# Patient Record
Sex: FEMALE | Race: BLACK OR AFRICAN AMERICAN | NOT HISPANIC OR LATINO | Employment: OTHER | URBAN - METROPOLITAN AREA
[De-identification: names, ages, dates, MRNs, and addresses within clinical notes are randomized per-mention and may not be internally consistent; named-entity substitution may affect disease eponyms.]

---

## 2017-01-01 ENCOUNTER — APPOINTMENT (OUTPATIENT)
Dept: CT IMAGING | Facility: HOSPITAL | Age: 72
End: 2017-01-01

## 2017-01-01 ENCOUNTER — HOSPITAL ENCOUNTER (OUTPATIENT)
Facility: HOSPITAL | Age: 72
Setting detail: OBSERVATION
LOS: 1 days | Discharge: HOME | End: 2017-02-27
Attending: EMERGENCY MEDICINE | Admitting: INTERNAL MEDICINE

## 2017-01-01 ENCOUNTER — APPOINTMENT (OUTPATIENT)
Dept: GENERAL RADIOLOGY | Facility: HOSPITAL | Age: 72
End: 2017-01-01

## 2017-01-01 ENCOUNTER — APPOINTMENT (OUTPATIENT)
Dept: CARDIOLOGY | Facility: HOSPITAL | Age: 72
End: 2017-01-01
Attending: INTERNAL MEDICINE

## 2017-01-01 ENCOUNTER — HOSPITAL ENCOUNTER (OUTPATIENT)
Dept: GENERAL RADIOLOGY | Facility: HOSPITAL | Age: 72
Discharge: HOME | End: 2017-02-01
Admitting: FAMILY MEDICINE

## 2017-01-01 ENCOUNTER — HOSPITAL ENCOUNTER (EMERGENCY)
Facility: HOSPITAL | Age: 72
Discharge: HOME | End: 2017-08-07
Attending: EMERGENCY MEDICINE | Admitting: EMERGENCY MEDICINE

## 2017-01-01 ENCOUNTER — OFFICE VISIT (OUTPATIENT)
Dept: FAMILY MEDICINE CLINIC | Facility: CLINIC | Age: 72
End: 2017-01-01

## 2017-01-01 ENCOUNTER — HOSPITAL ENCOUNTER (INPATIENT)
Facility: HOSPITAL | Age: 72
LOS: 9 days | Discharge: NURSING, REHAB OR LONG-TERM CARE | End: 2017-07-21
Attending: INTERNAL MEDICINE | Admitting: INTERNAL MEDICINE

## 2017-01-01 ENCOUNTER — ANESTHESIA (OUTPATIENT)
Dept: CARDIOLOGY | Facility: HOSPITAL | Age: 72
End: 2017-01-01

## 2017-01-01 ENCOUNTER — APPOINTMENT (OUTPATIENT)
Dept: CARDIOLOGY | Facility: HOSPITAL | Age: 72
End: 2017-01-01
Attending: HOSPITALIST

## 2017-01-01 ENCOUNTER — HOSPITAL ENCOUNTER (INPATIENT)
Facility: HOSPITAL | Age: 72
LOS: 4 days | End: 2017-08-24
Attending: EMERGENCY MEDICINE | Admitting: INTERNAL MEDICINE

## 2017-01-01 ENCOUNTER — TRANSCRIBE ORDERS (OUTPATIENT)
Dept: CARDIOLOGY | Facility: CLINIC | Age: 72
End: 2017-01-01

## 2017-01-01 ENCOUNTER — CLINICAL SUPPORT NO REQUIREMENTS (OUTPATIENT)
Dept: CARDIOLOGY | Facility: CLINIC | Age: 72
End: 2017-01-01

## 2017-01-01 ENCOUNTER — ANESTHESIA EVENT (OUTPATIENT)
Dept: CARDIOLOGY | Facility: HOSPITAL | Age: 72
End: 2017-01-01

## 2017-01-01 ENCOUNTER — TELEPHONE (OUTPATIENT)
Dept: ENDOCRINOLOGY | Age: 72
End: 2017-01-01

## 2017-01-01 ENCOUNTER — OFFICE VISIT (OUTPATIENT)
Dept: ENDOCRINOLOGY | Age: 72
End: 2017-01-01

## 2017-01-01 ENCOUNTER — APPOINTMENT (OUTPATIENT)
Dept: GENERAL RADIOLOGY | Facility: HOSPITAL | Age: 72
End: 2017-01-01
Attending: INTERNAL MEDICINE

## 2017-01-01 ENCOUNTER — HOSPITAL ENCOUNTER (INPATIENT)
Facility: HOSPITAL | Age: 72
LOS: 1 days | End: 2017-08-24
Attending: INTERNAL MEDICINE | Admitting: INTERNAL MEDICINE

## 2017-01-01 ENCOUNTER — OFFICE VISIT (OUTPATIENT)
Dept: CARDIOLOGY | Facility: CLINIC | Age: 72
End: 2017-01-01

## 2017-01-01 ENCOUNTER — HOSPITAL ENCOUNTER (OUTPATIENT)
Facility: HOSPITAL | Age: 72
Setting detail: OBSERVATION
Discharge: HOME HEALTH | End: 2017-05-02
Attending: EMERGENCY MEDICINE | Admitting: HOSPITALIST

## 2017-01-01 ENCOUNTER — TELEPHONE (OUTPATIENT)
Dept: CARDIOLOGY | Facility: CLINIC | Age: 72
End: 2017-01-01

## 2017-01-01 ENCOUNTER — LAB (OUTPATIENT)
Dept: LAB | Facility: HOSPITAL | Age: 72
End: 2017-01-01
Attending: INTERNAL MEDICINE

## 2017-01-01 ENCOUNTER — HOSPITAL ENCOUNTER (OUTPATIENT)
Dept: SLEEP MEDICINE | Facility: HOSPITAL | Age: 72
Discharge: HOME | End: 2017-06-13
Attending: INTERNAL MEDICINE

## 2017-01-01 VITALS
HEIGHT: 63 IN | HEART RATE: 88 BPM | SYSTOLIC BLOOD PRESSURE: 106 MMHG | OXYGEN SATURATION: 96 % | TEMPERATURE: 98 F | DIASTOLIC BLOOD PRESSURE: 79 MMHG | BODY MASS INDEX: 32.03 KG/M2 | RESPIRATION RATE: 18 BRPM | WEIGHT: 180.78 LBS

## 2017-01-01 VITALS
HEIGHT: 66 IN | DIASTOLIC BLOOD PRESSURE: 48 MMHG | RESPIRATION RATE: 12 BRPM | BODY MASS INDEX: 31.99 KG/M2 | OXYGEN SATURATION: 100 % | HEART RATE: 112 BPM | WEIGHT: 199.08 LBS | SYSTOLIC BLOOD PRESSURE: 62 MMHG | TEMPERATURE: 98.3 F

## 2017-01-01 VITALS
BODY MASS INDEX: 29.25 KG/M2 | HEART RATE: 102 BPM | WEIGHT: 182 LBS | HEIGHT: 66 IN | DIASTOLIC BLOOD PRESSURE: 70 MMHG | SYSTOLIC BLOOD PRESSURE: 126 MMHG

## 2017-01-01 VITALS
WEIGHT: 182.2 LBS | SYSTOLIC BLOOD PRESSURE: 128 MMHG | BODY MASS INDEX: 29.28 KG/M2 | HEIGHT: 66 IN | DIASTOLIC BLOOD PRESSURE: 78 MMHG

## 2017-01-01 VITALS
HEIGHT: 63 IN | SYSTOLIC BLOOD PRESSURE: 120 MMHG | TEMPERATURE: 98.2 F | OXYGEN SATURATION: 95 % | BODY MASS INDEX: 32.6 KG/M2 | DIASTOLIC BLOOD PRESSURE: 60 MMHG | HEART RATE: 84 BPM | WEIGHT: 184 LBS

## 2017-01-01 VITALS
SYSTOLIC BLOOD PRESSURE: 118 MMHG | OXYGEN SATURATION: 99 % | WEIGHT: 250 LBS | DIASTOLIC BLOOD PRESSURE: 64 MMHG | TEMPERATURE: 97.8 F | RESPIRATION RATE: 20 BRPM | HEART RATE: 80 BPM | BODY MASS INDEX: 37.89 KG/M2 | HEIGHT: 68 IN

## 2017-01-01 VITALS
HEART RATE: 66 BPM | DIASTOLIC BLOOD PRESSURE: 46 MMHG | HEIGHT: 65 IN | BODY MASS INDEX: 29.94 KG/M2 | SYSTOLIC BLOOD PRESSURE: 90 MMHG | RESPIRATION RATE: 18 BRPM | TEMPERATURE: 97.4 F | OXYGEN SATURATION: 92 % | WEIGHT: 179.68 LBS

## 2017-01-01 VITALS
BODY MASS INDEX: 28.61 KG/M2 | RESPIRATION RATE: 16 BRPM | DIASTOLIC BLOOD PRESSURE: 78 MMHG | HEART RATE: 97 BPM | OXYGEN SATURATION: 93 % | HEIGHT: 66 IN | TEMPERATURE: 98 F | SYSTOLIC BLOOD PRESSURE: 138 MMHG | WEIGHT: 178 LBS

## 2017-01-01 VITALS
HEIGHT: 66 IN | BODY MASS INDEX: 28.36 KG/M2 | TEMPERATURE: 97.5 F | OXYGEN SATURATION: 97 % | HEART RATE: 103 BPM | SYSTOLIC BLOOD PRESSURE: 129 MMHG | RESPIRATION RATE: 18 BRPM | WEIGHT: 176.44 LBS | DIASTOLIC BLOOD PRESSURE: 47 MMHG

## 2017-01-01 VITALS
OXYGEN SATURATION: 98 % | SYSTOLIC BLOOD PRESSURE: 130 MMHG | WEIGHT: 184 LBS | HEART RATE: 110 BPM | HEIGHT: 63 IN | DIASTOLIC BLOOD PRESSURE: 80 MMHG | TEMPERATURE: 98.5 F | BODY MASS INDEX: 32.6 KG/M2

## 2017-01-01 VITALS
BODY MASS INDEX: 32.6 KG/M2 | DIASTOLIC BLOOD PRESSURE: 78 MMHG | SYSTOLIC BLOOD PRESSURE: 138 MMHG | HEIGHT: 63 IN | WEIGHT: 184 LBS

## 2017-01-01 DIAGNOSIS — E55.9 VITAMIN D DEFICIENCY: ICD-10-CM

## 2017-01-01 DIAGNOSIS — G47.33 OBSTRUCTIVE SLEEP APNEA SYNDROME: ICD-10-CM

## 2017-01-01 DIAGNOSIS — I50.32 CHRONIC DIASTOLIC CONGESTIVE HEART FAILURE (HCC): Primary | ICD-10-CM

## 2017-01-01 DIAGNOSIS — Z79.01 LONG TERM (CURRENT) USE OF ANTICOAGULANTS: ICD-10-CM

## 2017-01-01 DIAGNOSIS — Z95.2 HISTORY OF AORTIC VALVE REPLACEMENT: ICD-10-CM

## 2017-01-01 DIAGNOSIS — S80.212A KNEE ABRASION, LEFT, INITIAL ENCOUNTER: ICD-10-CM

## 2017-01-01 DIAGNOSIS — E11.8 TYPE 2 DIABETES MELLITUS WITH COMPLICATION, UNSPECIFIED LONG TERM INSULIN USE STATUS: Primary | ICD-10-CM

## 2017-01-01 DIAGNOSIS — I10 BENIGN ESSENTIAL HYPERTENSION: ICD-10-CM

## 2017-01-01 DIAGNOSIS — I27.20 PULMONARY HYPERTENSION (HCC): ICD-10-CM

## 2017-01-01 DIAGNOSIS — I50.32 CHRONIC DIASTOLIC CONGESTIVE HEART FAILURE (HCC): ICD-10-CM

## 2017-01-01 DIAGNOSIS — I48.20 CHRONIC A-FIB (HCC): Primary | ICD-10-CM

## 2017-01-01 DIAGNOSIS — Z01.810 PRE-OPERATIVE CARDIOVASCULAR EXAMINATION: Primary | ICD-10-CM

## 2017-01-01 DIAGNOSIS — N18.4 CHRONIC KIDNEY DISEASE, STAGE IV (SEVERE) (HCC): ICD-10-CM

## 2017-01-01 DIAGNOSIS — Z13.6 SCREENING FOR ISCHEMIC HEART DISEASE: ICD-10-CM

## 2017-01-01 DIAGNOSIS — I47.20 VENTRICULAR TACHYCARDIA (HCC): Primary | ICD-10-CM

## 2017-01-01 DIAGNOSIS — N18.6 ESRD (END STAGE RENAL DISEASE) (HCC): ICD-10-CM

## 2017-01-01 DIAGNOSIS — I48.20 CHRONIC A-FIB (HCC): ICD-10-CM

## 2017-01-01 DIAGNOSIS — R06.02 SHORTNESS OF BREATH: ICD-10-CM

## 2017-01-01 DIAGNOSIS — E11.42 DIABETIC PERIPHERAL NEUROPATHY (HCC): ICD-10-CM

## 2017-01-01 DIAGNOSIS — Z95.2 HISTORY OF PROSTHETIC HEART VALVE: ICD-10-CM

## 2017-01-01 DIAGNOSIS — Z99.2 END STAGE RENAL FAILURE ON DIALYSIS (HCC): ICD-10-CM

## 2017-01-01 DIAGNOSIS — Z01.810 PRE-OPERATIVE CARDIOVASCULAR EXAMINATION: ICD-10-CM

## 2017-01-01 DIAGNOSIS — E78.2 MULTIPLE-TYPE HYPERLIPIDEMIA: ICD-10-CM

## 2017-01-01 DIAGNOSIS — I50.9 ACUTE ON CHRONIC CONGESTIVE HEART FAILURE, UNSPECIFIED CONGESTIVE HEART FAILURE TYPE: ICD-10-CM

## 2017-01-01 DIAGNOSIS — N18.6 END STAGE RENAL DISEASE (HCC): ICD-10-CM

## 2017-01-01 DIAGNOSIS — R05.9 COUGH: Primary | ICD-10-CM

## 2017-01-01 DIAGNOSIS — R42 LIGHTHEADEDNESS: ICD-10-CM

## 2017-01-01 DIAGNOSIS — S00.01XA SCALP ABRASION, INITIAL ENCOUNTER: Primary | ICD-10-CM

## 2017-01-01 DIAGNOSIS — G93.41 METABOLIC ENCEPHALOPATHY: ICD-10-CM

## 2017-01-01 DIAGNOSIS — J43.2 CENTRILOBULAR EMPHYSEMA (HCC): ICD-10-CM

## 2017-01-01 DIAGNOSIS — I50.32 CHRONIC DIASTOLIC HEART FAILURE (HCC): ICD-10-CM

## 2017-01-01 DIAGNOSIS — N18.6 END STAGE RENAL FAILURE ON DIALYSIS (HCC): ICD-10-CM

## 2017-01-01 DIAGNOSIS — R53.1 GENERALIZED WEAKNESS: Primary | ICD-10-CM

## 2017-01-01 DIAGNOSIS — J40 BRONCHITIS: ICD-10-CM

## 2017-01-01 DIAGNOSIS — R26.2 DIFFICULTY WALKING: Primary | ICD-10-CM

## 2017-01-01 DIAGNOSIS — R42 DIZZINESS: Primary | ICD-10-CM

## 2017-01-01 DIAGNOSIS — R27.0 ATAXIA: ICD-10-CM

## 2017-01-01 DIAGNOSIS — R53.1 WEAKNESS GENERALIZED: ICD-10-CM

## 2017-01-01 DIAGNOSIS — E83.51 HYPOCALCEMIA: ICD-10-CM

## 2017-01-01 DIAGNOSIS — R26.2 DIFFICULTY WALKING: ICD-10-CM

## 2017-01-01 DIAGNOSIS — R53.1 GENERALIZED WEAKNESS: ICD-10-CM

## 2017-01-01 DIAGNOSIS — Z95.810 AUTOMATIC IMPLANTABLE CARDIOVERTER-DEFIBRILLATOR IN SITU: ICD-10-CM

## 2017-01-01 DIAGNOSIS — J44.9 CHRONIC OBSTRUCTIVE BRONCHITIS (HCC): ICD-10-CM

## 2017-01-01 DIAGNOSIS — R05.9 COUGH: ICD-10-CM

## 2017-01-01 DIAGNOSIS — M62.81 MUSCLE WEAKNESS (GENERALIZED): ICD-10-CM

## 2017-01-01 DIAGNOSIS — R26.89 IMBALANCE: ICD-10-CM

## 2017-01-01 DIAGNOSIS — I47.29 NONSUSTAINED VENTRICULAR TACHYCARDIA (HCC): ICD-10-CM

## 2017-01-01 DIAGNOSIS — A41.9 SEPSIS, DUE TO UNSPECIFIED ORGANISM: Primary | ICD-10-CM

## 2017-01-01 DIAGNOSIS — R53.82 CHRONIC FATIGUE: ICD-10-CM

## 2017-01-01 LAB
25(OH)D3+25(OH)D2 SERPL-MCNC: 23.8 NG/ML (ref 30–100)
ACT BLD: 158 SECONDS (ref 82–152)
ACTH PLAS-MCNC: 11.6 PG/ML (ref 7.2–63.3)
ALBUMIN SERPL-MCNC: 3.2 G/DL (ref 3.5–5.2)
ALBUMIN SERPL-MCNC: 3.2 G/DL (ref 3.5–5.2)
ALBUMIN SERPL-MCNC: 3.3 G/DL (ref 3.5–5.2)
ALBUMIN SERPL-MCNC: 3.5 G/DL (ref 3.5–5.2)
ALBUMIN SERPL-MCNC: 3.6 G/DL (ref 3.5–5.2)
ALBUMIN SERPL-MCNC: 3.7 G/DL (ref 3.5–5.2)
ALBUMIN SERPL-MCNC: 3.9 G/DL (ref 3.5–5.2)
ALBUMIN SERPL-MCNC: 4.1 G/DL (ref 3.5–5.2)
ALBUMIN/GLOB SERPL: 0.8 G/DL
ALBUMIN/GLOB SERPL: 0.9 G/DL
ALBUMIN/GLOB SERPL: 1.1 G/DL
ALBUMIN/GLOB SERPL: 1.2 G/DL
ALDOLASE SERPL-CCNC: 5.2 U/L (ref 3.3–10.3)
ALP SERPL-CCNC: 107 U/L (ref 39–117)
ALP SERPL-CCNC: 133 U/L (ref 39–117)
ALP SERPL-CCNC: 135 U/L (ref 39–117)
ALP SERPL-CCNC: 141 U/L (ref 39–117)
ALT SERPL W P-5'-P-CCNC: 14 U/L (ref 1–33)
ALT SERPL W P-5'-P-CCNC: 15 U/L (ref 1–33)
ALT SERPL W P-5'-P-CCNC: 18 U/L (ref 1–33)
ALT SERPL-CCNC: 12 U/L (ref 1–33)
ANION GAP SERPL CALCULATED.3IONS-SCNC: 17.9 MMOL/L
ANION GAP SERPL CALCULATED.3IONS-SCNC: 19.3 MMOL/L
ANION GAP SERPL CALCULATED.3IONS-SCNC: 19.8 MMOL/L
ANION GAP SERPL CALCULATED.3IONS-SCNC: 20.3 MMOL/L
ANION GAP SERPL CALCULATED.3IONS-SCNC: 20.9 MMOL/L
ANION GAP SERPL CALCULATED.3IONS-SCNC: 21.1 MMOL/L
ANION GAP SERPL CALCULATED.3IONS-SCNC: 21.5 MMOL/L
ANION GAP SERPL CALCULATED.3IONS-SCNC: 21.6 MMOL/L
ANION GAP SERPL CALCULATED.3IONS-SCNC: 21.9 MMOL/L
ANION GAP SERPL CALCULATED.3IONS-SCNC: 22.3 MMOL/L
ANION GAP SERPL CALCULATED.3IONS-SCNC: 22.3 MMOL/L
ANION GAP SERPL CALCULATED.3IONS-SCNC: 22.4 MMOL/L
ANION GAP SERPL CALCULATED.3IONS-SCNC: 22.5 MMOL/L
ANION GAP SERPL CALCULATED.3IONS-SCNC: 22.7 MMOL/L
ANION GAP SERPL CALCULATED.3IONS-SCNC: 23.4 MMOL/L
ANION GAP SERPL CALCULATED.3IONS-SCNC: 24.9 MMOL/L
ANION GAP SERPL CALCULATED.3IONS-SCNC: 25.6 MMOL/L
ANION GAP SERPL CALCULATED.3IONS-SCNC: 25.6 MMOL/L
ANION GAP SERPL CALCULATED.3IONS-SCNC: 25.7 MMOL/L
ANION GAP SERPL CALCULATED.3IONS-SCNC: 26.2 MMOL/L
ANISOCYTOSIS BLD QL: ABNORMAL
APTT PPP: 28.9 SECONDS (ref 22.7–35.4)
APTT PPP: 38.3 SECONDS (ref 22.7–35.4)
APTT PPP: 38.5 SECONDS (ref 22.7–35.4)
APTT PPP: 38.6 SECONDS (ref 22.7–35.4)
APTT PPP: 38.8 SECONDS (ref 22.7–35.4)
APTT PPP: 40.5 SECONDS (ref 22.7–35.4)
APTT PPP: 41.3 SECONDS (ref 22.7–35.4)
APTT PPP: 43.9 SECONDS (ref 22.7–35.4)
APTT PPP: 44.9 SECONDS (ref 22.7–35.4)
APTT PPP: 48.4 SECONDS (ref 22.7–35.4)
ARTERIAL PATENCY WRIST A: ABNORMAL
AST SERPL-CCNC: 17 U/L (ref 1–32)
AST SERPL-CCNC: 19 U/L (ref 1–32)
AST SERPL-CCNC: 25 U/L (ref 1–32)
AST SERPL-CCNC: 31 U/L (ref 1–32)
ATMOSPHERIC PRESS: 749.3 MMHG
ATMOSPHERIC PRESS: 752.3 MMHG
ATMOSPHERIC PRESS: 753.3 MMHG
B PERT DNA SPEC QL NAA+PROBE: NOT DETECTED
BACTERIA BLD CULT: ABNORMAL
BACTERIA SPEC AEROBE CULT: ABNORMAL
BACTERIA SPEC RESP CULT: NORMAL
BASE EXCESS BLDA CALC-SCNC: -1.9 MMOL/L (ref 0–2)
BASE EXCESS BLDA CALC-SCNC: -4.2 MMOL/L (ref 0–2)
BASE EXCESS BLDA CALC-SCNC: -5.4 MMOL/L (ref 0–2)
BASOPHILS # BLD AUTO: 0.01 10*3/MM3 (ref 0–0.2)
BASOPHILS # BLD AUTO: 0.02 10*3/MM3 (ref 0–0.2)
BASOPHILS # BLD MANUAL: 0.19 10*3/MM3 (ref 0–0.2)
BASOPHILS NFR BLD AUTO: 0.1 % (ref 0–1.5)
BASOPHILS NFR BLD AUTO: 0.2 % (ref 0–1.5)
BASOPHILS NFR BLD AUTO: 0.3 % (ref 0–1.5)
BASOPHILS NFR BLD AUTO: 1 % (ref 0–1.5)
BDY SITE: ABNORMAL
BH CV ECHO MEAS - AO MEAN PG (FULL): 10 MMHG
BH CV ECHO MEAS - AO MEAN PG: 12 MMHG
BH CV ECHO MEAS - AO V2 MAX: 230 CM/SEC
BH CV ECHO MEAS - AO V2 MEAN: 160 CM/SEC
BH CV ECHO MEAS - AO V2 VTI: 39.3 CM
BH CV ECHO MEAS - AVA(I,A): 1 CM^2
BH CV ECHO MEAS - AVA(I,D): 1 CM^2
BH CV ECHO MEAS - BSA(HAYCOCK): 1.9 M^2
BH CV ECHO MEAS - BSA: 1.8 M^2
BH CV ECHO MEAS - BZI_BMI: 31.9 KILOGRAMS/M^2
BH CV ECHO MEAS - BZI_METRIC_HEIGHT: 160 CM
BH CV ECHO MEAS - BZI_METRIC_WEIGHT: 81.6 KG
BH CV ECHO MEAS - CONTRAST EF 4CH: 50.6 ML/M^2
BH CV ECHO MEAS - EDV(CUBED): 91.1 ML
BH CV ECHO MEAS - EDV(MOD-SP4): 81 ML
BH CV ECHO MEAS - EDV(TEICH): 92.4 ML
BH CV ECHO MEAS - EF(CUBED): 64 %
BH CV ECHO MEAS - EF(MOD-SP4): 50.6 %
BH CV ECHO MEAS - EF(TEICH): 55.7 %
BH CV ECHO MEAS - ESV(CUBED): 32.8 ML
BH CV ECHO MEAS - ESV(MOD-SP4): 40 ML
BH CV ECHO MEAS - ESV(TEICH): 41 ML
BH CV ECHO MEAS - FS: 28.9 %
BH CV ECHO MEAS - IVS/LVPW: 1.1
BH CV ECHO MEAS - IVSD: 1 CM
BH CV ECHO MEAS - LA DIMENSION: 7 CM
BH CV ECHO MEAS - LAT PEAK E' VEL: 6 CM/SEC
BH CV ECHO MEAS - LV DIASTOLIC VOL/BSA (35-75): 43.8 ML/M^2
BH CV ECHO MEAS - LV MASS(C)D: 142.9 GRAMS
BH CV ECHO MEAS - LV MASS(C)DI: 77.3 GRAMS/M^2
BH CV ECHO MEAS - LV MEAN PG: 2 MMHG
BH CV ECHO MEAS - LV SYSTOLIC VOL/BSA (12-30): 21.6 ML/M^2
BH CV ECHO MEAS - LV V1 MAX: 89 CM/SEC
BH CV ECHO MEAS - LV V1 MEAN: 58.4 CM/SEC
BH CV ECHO MEAS - LV V1 VTI: 17.8 CM
BH CV ECHO MEAS - LVIDD: 4.5 CM
BH CV ECHO MEAS - LVIDS: 3.2 CM
BH CV ECHO MEAS - LVLD AP4: 7.8 CM
BH CV ECHO MEAS - LVLS AP4: 7.6 CM
BH CV ECHO MEAS - LVOT AREA (M): 2.3 CM^2
BH CV ECHO MEAS - LVOT AREA: 2.3 CM^2
BH CV ECHO MEAS - LVOT DIAM: 1.7 CM
BH CV ECHO MEAS - LVPWD: 0.9 CM
BH CV ECHO MEAS - MED PEAK E' VEL: 4 CM/SEC
BH CV ECHO MEAS - MV DEC SLOPE: 599 CM/SEC^2
BH CV ECHO MEAS - MV DEC TIME: 0.26 SEC
BH CV ECHO MEAS - MV E MAX VEL: 193 CM/SEC
BH CV ECHO MEAS - MV MEAN PG: 6 MMHG
BH CV ECHO MEAS - MV P1/2T MAX VEL: 194 CM/SEC
BH CV ECHO MEAS - MV P1/2T: 94.9 MSEC
BH CV ECHO MEAS - MV V2 MEAN: 114 CM/SEC
BH CV ECHO MEAS - MV V2 VTI: 40.4 CM
BH CV ECHO MEAS - MVA P1/2T LCG: 1.1 CM^2
BH CV ECHO MEAS - MVA(P1/2T): 2.3 CM^2
BH CV ECHO MEAS - MVA(VTI): 1 CM^2
BH CV ECHO MEAS - PA ACC SLOPE: 36.1 CM/SEC^2
BH CV ECHO MEAS - PA ACC TIME: 0.11 SEC
BH CV ECHO MEAS - PA MAX PG (FULL): 1.2 MMHG
BH CV ECHO MEAS - PA MAX PG: 3.3 MMHG
BH CV ECHO MEAS - PA PR(ACCEL): 28.2 MMHG
BH CV ECHO MEAS - PA V2 MAX: 90.9 CM/SEC
BH CV ECHO MEAS - PVA(V,A): 3.3 CM^2
BH CV ECHO MEAS - PVA(V,D): 3.3 CM^2
BH CV ECHO MEAS - QP/QS: 1.6
BH CV ECHO MEAS - RAP SYSTOLE: 8 MMHG
BH CV ECHO MEAS - RV MAX PG: 2.1 MMHG
BH CV ECHO MEAS - RV MEAN PG: 1 MMHG
BH CV ECHO MEAS - RV V1 MAX: 72.2 CM/SEC
BH CV ECHO MEAS - RV V1 MEAN: 47.7 CM/SEC
BH CV ECHO MEAS - RV V1 VTI: 15.1 CM
BH CV ECHO MEAS - RVOT AREA: 4.2 CM^2
BH CV ECHO MEAS - RVOT DIAM: 2.3 CM
BH CV ECHO MEAS - RVSP: 63 MMHG
BH CV ECHO MEAS - SI(CUBED): 31.6 ML/M^2
BH CV ECHO MEAS - SI(LVOT): 21.8 ML/M^2
BH CV ECHO MEAS - SI(MOD-SP4): 22.2 ML/M^2
BH CV ECHO MEAS - SI(TEICH): 27.8 ML/M^2
BH CV ECHO MEAS - SV(CUBED): 58.4 ML
BH CV ECHO MEAS - SV(LVOT): 40.4 ML
BH CV ECHO MEAS - SV(MOD-SP4): 41 ML
BH CV ECHO MEAS - SV(RVOT): 62.7 ML
BH CV ECHO MEAS - SV(TEICH): 51.5 ML
BH CV ECHO MEAS - TR MAX VEL: 369 CM/SEC
BH CV ECHO MEAS - TR MAX VEL: 383 CM/SEC
BH CV UPPER VENOUS LEFT AXILLARY AUGMENT: NORMAL
BH CV UPPER VENOUS LEFT AXILLARY COMPETENT: NORMAL
BH CV UPPER VENOUS LEFT AXILLARY COMPRESS: NORMAL
BH CV UPPER VENOUS LEFT AXILLARY PHASIC: NORMAL
BH CV UPPER VENOUS LEFT AXILLARY SPONT: NORMAL
BH CV UPPER VENOUS LEFT BASILIC FOREARM COMPRESS: NORMAL
BH CV UPPER VENOUS LEFT BASILIC UPPER COMPRESS: NORMAL
BH CV UPPER VENOUS LEFT BRACHIAL COMPRESS: NORMAL
BH CV UPPER VENOUS LEFT CEPHALIC FOREARM COMPRESS: NORMAL
BH CV UPPER VENOUS LEFT CEPHALIC UPPER COMPRESS: NORMAL
BH CV UPPER VENOUS LEFT INTERNAL JUGULAR AUGMENT: NORMAL
BH CV UPPER VENOUS LEFT INTERNAL JUGULAR COMPETENT: NORMAL
BH CV UPPER VENOUS LEFT INTERNAL JUGULAR COMPRESS: NORMAL
BH CV UPPER VENOUS LEFT INTERNAL JUGULAR PHASIC: NORMAL
BH CV UPPER VENOUS LEFT INTERNAL JUGULAR SPONT: NORMAL
BH CV UPPER VENOUS LEFT RADIAL COMPRESS: NORMAL
BH CV UPPER VENOUS LEFT SUBCLAVIAN AUGMENT: NORMAL
BH CV UPPER VENOUS LEFT SUBCLAVIAN COMPETENT: NORMAL
BH CV UPPER VENOUS LEFT SUBCLAVIAN PHASIC: NORMAL
BH CV UPPER VENOUS LEFT SUBCLAVIAN SPONT: NORMAL
BH CV UPPER VENOUS LEFT ULNAR COMPRESS: NORMAL
BH CV UPPER VENOUS RIGHT AXILLARY AUGMENT: NORMAL
BH CV UPPER VENOUS RIGHT AXILLARY COMPETENT: NORMAL
BH CV UPPER VENOUS RIGHT AXILLARY COMPRESS: NORMAL
BH CV UPPER VENOUS RIGHT AXILLARY PHASIC: NORMAL
BH CV UPPER VENOUS RIGHT AXILLARY SPONT: NORMAL
BH CV UPPER VENOUS RIGHT BASILIC FOREARM COMPRESS: NORMAL
BH CV UPPER VENOUS RIGHT BASILIC UPPER COMPRESS: NORMAL
BH CV UPPER VENOUS RIGHT BRACHIAL COMPRESS: NORMAL
BH CV UPPER VENOUS RIGHT CEPHALIC FOREARM COMPRESS: NORMAL
BH CV UPPER VENOUS RIGHT CEPHALIC UPPER COMPRESS: NORMAL
BH CV UPPER VENOUS RIGHT INTERNAL JUGULAR AUGMENT: NORMAL
BH CV UPPER VENOUS RIGHT INTERNAL JUGULAR COLOR: 1
BH CV UPPER VENOUS RIGHT INTERNAL JUGULAR COMPRESS: NORMAL
BH CV UPPER VENOUS RIGHT INTERNAL JUGULAR PHASIC: NORMAL
BH CV UPPER VENOUS RIGHT INTERNAL JUGULAR SPONT: NORMAL
BH CV UPPER VENOUS RIGHT INTERNAL JUGULAR THROMBUS: NORMAL
BH CV UPPER VENOUS RIGHT RADIAL COMPRESS: NORMAL
BH CV UPPER VENOUS RIGHT SUBCLAVIAN AUGMENT: NORMAL
BH CV UPPER VENOUS RIGHT SUBCLAVIAN COMPETENT: NORMAL
BH CV UPPER VENOUS RIGHT SUBCLAVIAN COMPRESS: NORMAL
BH CV UPPER VENOUS RIGHT SUBCLAVIAN PHASIC: NORMAL
BH CV UPPER VENOUS RIGHT SUBCLAVIAN SPONT: NORMAL
BH CV UPPER VENOUS RIGHT ULNAR COMPRESS: NORMAL
BH CV VAS BP LEFT ARM: NORMAL MMHG
BH CV XLRA - RV BASE: 3.8 CM
BH CV XLRA - TDI S': 12 CM/SEC
BILIRUB SERPL-MCNC: 0.7 MG/DL (ref 0.1–1.2)
BILIRUB SERPL-MCNC: 1 MG/DL (ref 0.1–1.2)
BILIRUB SERPL-MCNC: 1 MG/DL (ref 0.1–1.2)
BILIRUB SERPL-MCNC: 2.7 MG/DL (ref 0.1–1.2)
BUN BLD-MCNC: 17 MG/DL (ref 8–23)
BUN BLD-MCNC: 20 MG/DL (ref 8–23)
BUN BLD-MCNC: 23 MG/DL (ref 8–23)
BUN BLD-MCNC: 25 MG/DL (ref 8–23)
BUN BLD-MCNC: 26 MG/DL (ref 8–23)
BUN BLD-MCNC: 27 MG/DL (ref 8–23)
BUN BLD-MCNC: 29 MG/DL (ref 8–23)
BUN BLD-MCNC: 30 MG/DL (ref 8–23)
BUN BLD-MCNC: 32 MG/DL (ref 8–23)
BUN BLD-MCNC: 33 MG/DL (ref 8–23)
BUN BLD-MCNC: 37 MG/DL (ref 8–23)
BUN BLD-MCNC: 41 MG/DL (ref 8–23)
BUN BLD-MCNC: 44 MG/DL (ref 8–23)
BUN BLD-MCNC: 46 MG/DL (ref 8–23)
BUN BLD-MCNC: 46 MG/DL (ref 8–23)
BUN BLD-MCNC: 48 MG/DL (ref 8–23)
BUN BLD-MCNC: 49 MG/DL (ref 8–23)
BUN BLD-MCNC: 54 MG/DL (ref 8–23)
BUN BLD-MCNC: 54 MG/DL (ref 8–23)
BUN BLD-MCNC: 58 MG/DL (ref 8–23)
BUN SERPL-MCNC: 30 MG/DL (ref 8–23)
BUN/CREAT SERPL: 3.9 (ref 7–25)
BUN/CREAT SERPL: 4.6 (ref 7–25)
BUN/CREAT SERPL: 4.8 (ref 7–25)
BUN/CREAT SERPL: 4.9 (ref 7–25)
BUN/CREAT SERPL: 5 (ref 7–25)
BUN/CREAT SERPL: 5.3 (ref 7–25)
BUN/CREAT SERPL: 5.4 (ref 7–25)
BUN/CREAT SERPL: 5.6 (ref 7–25)
BUN/CREAT SERPL: 5.9 (ref 7–25)
BUN/CREAT SERPL: 6 (ref 7–25)
BUN/CREAT SERPL: 6.1 (ref 7–25)
BUN/CREAT SERPL: 6.2 (ref 7–25)
BUN/CREAT SERPL: 6.3 (ref 7–25)
BUN/CREAT SERPL: 6.6 (ref 7–25)
BUN/CREAT SERPL: 6.8 (ref 7–25)
BUN/CREAT SERPL: 7.3 (ref 7–25)
BUN/CREAT SERPL: 7.4 (ref 7–25)
BUN/CREAT SERPL: 7.4 (ref 7–25)
BUN/CREAT SERPL: 8.4 (ref 7–25)
BURR CELLS BLD QL SMEAR: ABNORMAL
C DIFF TOX GENS STL QL NAA+PROBE: NEGATIVE
C PNEUM DNA NPH QL NAA+NON-PROBE: NOT DETECTED
C3 FRG RBC-MCNC: ABNORMAL
CA-I BLD-MCNC: 4.4 MG/DL (ref 4.6–5.4)
CA-I BLD-MCNC: 4.9 MG/DL (ref 4.6–5.4)
CA-I SERPL ISE-MCNC: 1.09 MMOL/L (ref 1.1–1.35)
CA-I SERPL ISE-MCNC: 1.22 MMOL/L (ref 1.1–1.35)
CALCIUM SERPL-MCNC: 9.3 MG/DL (ref 8.6–10.5)
CALCIUM SPEC-SCNC: 10 MG/DL (ref 8.6–10.5)
CALCIUM SPEC-SCNC: 10.2 MG/DL (ref 8.6–10.5)
CALCIUM SPEC-SCNC: 7.2 MG/DL (ref 8.6–10.5)
CALCIUM SPEC-SCNC: 8.3 MG/DL (ref 8.6–10.5)
CALCIUM SPEC-SCNC: 8.5 MG/DL (ref 8.6–10.5)
CALCIUM SPEC-SCNC: 8.7 MG/DL (ref 8.6–10.5)
CALCIUM SPEC-SCNC: 8.7 MG/DL (ref 8.6–10.5)
CALCIUM SPEC-SCNC: 9 MG/DL (ref 8.6–10.5)
CALCIUM SPEC-SCNC: 9.1 MG/DL (ref 8.6–10.5)
CALCIUM SPEC-SCNC: 9.2 MG/DL (ref 8.6–10.5)
CALCIUM SPEC-SCNC: 9.3 MG/DL (ref 8.6–10.5)
CALCIUM SPEC-SCNC: 9.4 MG/DL (ref 8.6–10.5)
CALCIUM SPEC-SCNC: 9.5 MG/DL (ref 8.6–10.5)
CHLORIDE SERPL-SCNC: 105 MMOL/L (ref 98–107)
CHLORIDE SERPL-SCNC: 88 MMOL/L (ref 98–107)
CHLORIDE SERPL-SCNC: 89 MMOL/L (ref 98–107)
CHLORIDE SERPL-SCNC: 90 MMOL/L (ref 98–107)
CHLORIDE SERPL-SCNC: 90 MMOL/L (ref 98–107)
CHLORIDE SERPL-SCNC: 91 MMOL/L (ref 98–107)
CHLORIDE SERPL-SCNC: 91 MMOL/L (ref 98–107)
CHLORIDE SERPL-SCNC: 92 MMOL/L (ref 98–107)
CHLORIDE SERPL-SCNC: 92 MMOL/L (ref 98–107)
CHLORIDE SERPL-SCNC: 93 MMOL/L (ref 98–107)
CHLORIDE SERPL-SCNC: 95 MMOL/L (ref 98–107)
CHLORIDE SERPL-SCNC: 96 MMOL/L (ref 98–107)
CHLORIDE SERPL-SCNC: 97 MMOL/L (ref 98–107)
CHOLEST SERPL-MCNC: 100 MG/DL (ref 0–200)
CHOLEST SERPL-MCNC: 152 MG/DL (ref 0–200)
CK SERPL-CCNC: 311 U/L (ref 20–180)
CK SERPL-CCNC: 79 U/L (ref 20–180)
CO2 SERPL-SCNC: 14.1 MMOL/L (ref 22–29)
CO2 SERPL-SCNC: 15.3 MMOL/L (ref 22–29)
CO2 SERPL-SCNC: 15.4 MMOL/L (ref 22–29)
CO2 SERPL-SCNC: 16.4 MMOL/L (ref 22–29)
CO2 SERPL-SCNC: 16.8 MMOL/L (ref 22–29)
CO2 SERPL-SCNC: 18.1 MMOL/L (ref 22–29)
CO2 SERPL-SCNC: 18.3 MMOL/L (ref 22–29)
CO2 SERPL-SCNC: 18.7 MMOL/L (ref 22–29)
CO2 SERPL-SCNC: 19.4 MMOL/L (ref 22–29)
CO2 SERPL-SCNC: 19.5 MMOL/L (ref 22–29)
CO2 SERPL-SCNC: 20.6 MMOL/L (ref 22–29)
CO2 SERPL-SCNC: 20.7 MMOL/L (ref 22–29)
CO2 SERPL-SCNC: 21.2 MMOL/L (ref 22–29)
CO2 SERPL-SCNC: 21.6 MMOL/L (ref 22–29)
CO2 SERPL-SCNC: 22.1 MMOL/L (ref 22–29)
CO2 SERPL-SCNC: 23.5 MMOL/L (ref 22–29)
CO2 SERPL-SCNC: 23.9 MMOL/L (ref 22–29)
CO2 SERPL-SCNC: 25.7 MMOL/L (ref 22–29)
CO2 SERPL-SCNC: 26.3 MMOL/L (ref 22–29)
CO2 SERPL-SCNC: 26.7 MMOL/L (ref 22–29)
CO2 SERPL-SCNC: 29.1 MMOL/L (ref 22–29)
CORTIS SERPL-MCNC: 14.75 MCG/DL
CORTIS SERPL-MCNC: 29.25 MCG/DL
CORTIS SERPL-MCNC: 35.61 MCG/DL
CREAT BLD-MCNC: 4.37 MG/DL (ref 0.57–1)
CREAT BLD-MCNC: 4.38 MG/DL (ref 0.57–1)
CREAT BLD-MCNC: 4.41 MG/DL (ref 0.57–1)
CREAT BLD-MCNC: 4.59 MG/DL (ref 0.57–1)
CREAT BLD-MCNC: 4.68 MG/DL (ref 0.57–1)
CREAT BLD-MCNC: 5.35 MG/DL (ref 0.57–1)
CREAT BLD-MCNC: 5.45 MG/DL (ref 0.57–1)
CREAT BLD-MCNC: 5.51 MG/DL (ref 0.57–1)
CREAT BLD-MCNC: 5.7 MG/DL (ref 0.57–1)
CREAT BLD-MCNC: 5.9 MG/DL (ref 0.57–1)
CREAT BLD-MCNC: 6.08 MG/DL (ref 0.57–1)
CREAT BLD-MCNC: 6.09 MG/DL (ref 0.57–1)
CREAT BLD-MCNC: 6.18 MG/DL (ref 0.57–1)
CREAT BLD-MCNC: 6.32 MG/DL (ref 0.57–1)
CREAT BLD-MCNC: 6.9 MG/DL (ref 0.57–1)
CREAT BLD-MCNC: 7.2 MG/DL (ref 0.57–1)
CREAT BLD-MCNC: 7.24 MG/DL (ref 0.57–1)
CREAT BLD-MCNC: 7.36 MG/DL (ref 0.57–1)
CREAT BLD-MCNC: 7.46 MG/DL (ref 0.57–1)
CREAT BLD-MCNC: 8 MG/DL (ref 0.57–1)
CREAT BLD-MCNC: 8.54 MG/DL (ref 0.57–1)
CREAT SERPL-MCNC: 5.71 MG/DL (ref 0.57–1)
CRP SERPL-MCNC: 12.16 MG/DL (ref 0–0.5)
D-LACTATE SERPL-SCNC: 1.4 MMOL/L (ref 0.5–2)
D-LACTATE SERPL-SCNC: 2.4 MMOL/L (ref 0.5–2)
D-LACTATE SERPL-SCNC: 3.3 MMOL/L (ref 0.5–2)
DEPRECATED RDW RBC AUTO: 54.4 FL (ref 37–54)
DEPRECATED RDW RBC AUTO: 56.2 FL (ref 37–54)
DEPRECATED RDW RBC AUTO: 57.5 FL (ref 37–54)
DEPRECATED RDW RBC AUTO: 57.8 FL (ref 37–54)
DEPRECATED RDW RBC AUTO: 58.1 FL (ref 37–54)
DEPRECATED RDW RBC AUTO: 58.7 FL (ref 37–54)
DEPRECATED RDW RBC AUTO: 59 FL (ref 37–54)
DEPRECATED RDW RBC AUTO: 60.1 FL (ref 37–54)
DEPRECATED RDW RBC AUTO: 60.3 FL (ref 37–54)
DEPRECATED RDW RBC AUTO: 61.3 FL (ref 37–54)
DEPRECATED RDW RBC AUTO: 61.4 FL (ref 37–54)
DEPRECATED RDW RBC AUTO: 62.1 FL (ref 37–54)
DEPRECATED RDW RBC AUTO: 62.3 FL (ref 37–54)
DEPRECATED RDW RBC AUTO: 62.4 FL (ref 37–54)
DEPRECATED RDW RBC AUTO: 63.3 FL (ref 37–54)
DEPRECATED RDW RBC AUTO: 64.5 FL (ref 37–54)
E/E' RATIO: 39
EOSINOPHIL # BLD AUTO: 0 10*3/MM3 (ref 0–0.7)
EOSINOPHIL # BLD AUTO: 0.01 10*3/MM3 (ref 0–0.7)
EOSINOPHIL # BLD AUTO: 0.05 10*3/MM3 (ref 0–0.7)
EOSINOPHIL # BLD AUTO: 0.08 10*3/MM3 (ref 0–0.7)
EOSINOPHIL # BLD AUTO: 0.11 10*3/MM3 (ref 0–0.7)
EOSINOPHIL # BLD AUTO: 0.12 10*3/MM3 (ref 0–0.7)
EOSINOPHIL # BLD AUTO: 0.13 10*3/MM3 (ref 0–0.7)
EOSINOPHIL # BLD AUTO: 0.18 10*3/MM3 (ref 0–0.7)
EOSINOPHIL # BLD MANUAL: 0.07 10*3/MM3 (ref 0–0.7)
EOSINOPHIL # BLD MANUAL: 0.08 10*3/MM3 (ref 0–0.7)
EOSINOPHIL # BLD MANUAL: 0.09 10*3/MM3 (ref 0–0.7)
EOSINOPHIL # BLD MANUAL: 0.1 10*3/MM3 (ref 0–0.7)
EOSINOPHIL # BLD MANUAL: 0.38 10*3/MM3 (ref 0–0.7)
EOSINOPHIL NFR BLD AUTO: 0 % (ref 0.3–6.2)
EOSINOPHIL NFR BLD AUTO: 0.1 % (ref 0.3–6.2)
EOSINOPHIL NFR BLD AUTO: 0.4 % (ref 0.3–6.2)
EOSINOPHIL NFR BLD AUTO: 0.9 % (ref 0.3–6.2)
EOSINOPHIL NFR BLD AUTO: 1 % (ref 0.3–6.2)
EOSINOPHIL NFR BLD AUTO: 1.4 % (ref 0.3–6.2)
EOSINOPHIL NFR BLD AUTO: 1.8 % (ref 0.3–6.2)
EOSINOPHIL NFR BLD AUTO: 2.2 % (ref 0.3–6.2)
EOSINOPHIL NFR BLD MANUAL: 1 % (ref 0.3–6.2)
EOSINOPHIL NFR BLD MANUAL: 2 % (ref 0.3–6.2)
ERYTHROCYTE [DISTWIDTH] IN BLOOD BY AUTOMATED COUNT: 17.1 % (ref 11.7–13)
ERYTHROCYTE [DISTWIDTH] IN BLOOD BY AUTOMATED COUNT: 17.2 % (ref 11.7–13)
ERYTHROCYTE [DISTWIDTH] IN BLOOD BY AUTOMATED COUNT: 17.3 % (ref 11.7–13)
ERYTHROCYTE [DISTWIDTH] IN BLOOD BY AUTOMATED COUNT: 17.6 % (ref 11.7–13)
ERYTHROCYTE [DISTWIDTH] IN BLOOD BY AUTOMATED COUNT: 17.7 % (ref 11.7–13)
ERYTHROCYTE [DISTWIDTH] IN BLOOD BY AUTOMATED COUNT: 18 % (ref 11.7–13)
ERYTHROCYTE [DISTWIDTH] IN BLOOD BY AUTOMATED COUNT: 18.1 % (ref 11.7–13)
ERYTHROCYTE [DISTWIDTH] IN BLOOD BY AUTOMATED COUNT: 18.3 % (ref 11.7–13)
ERYTHROCYTE [DISTWIDTH] IN BLOOD BY AUTOMATED COUNT: 18.5 % (ref 11.7–13)
ERYTHROCYTE [DISTWIDTH] IN BLOOD BY AUTOMATED COUNT: 18.7 % (ref 11.7–13)
ERYTHROCYTE [DISTWIDTH] IN BLOOD BY AUTOMATED COUNT: 18.8 % (ref 11.7–13)
ERYTHROCYTE [DISTWIDTH] IN BLOOD BY AUTOMATED COUNT: 18.9 % (ref 11.7–13)
ERYTHROCYTE [DISTWIDTH] IN BLOOD BY AUTOMATED COUNT: 19 % (ref 11.7–13)
ERYTHROCYTE [DISTWIDTH] IN BLOOD BY AUTOMATED COUNT: 19.6 % (ref 11.7–13)
ERYTHROCYTE [DISTWIDTH] IN BLOOD BY AUTOMATED COUNT: 20 % (ref 11.7–13)
ERYTHROCYTE [DISTWIDTH] IN BLOOD BY AUTOMATED COUNT: 20.8 % (ref 11.7–13)
FLUAV H1 2009 PAND RNA NPH QL NAA+PROBE: NOT DETECTED
FLUAV H1 HA GENE NPH QL NAA+PROBE: NOT DETECTED
FLUAV H3 RNA NPH QL NAA+PROBE: NOT DETECTED
FLUAV SUBTYP SPEC NAA+PROBE: NOT DETECTED
FLUBV RNA ISLT QL NAA+PROBE: NOT DETECTED
FT4I SERPL CALC-MCNC: 2 (ref 1.2–4.9)
GAS FLOW AIRWAY: 3 LPM
GFR SERPL CREATININE-BSD FRML MDRD: 10 ML/MIN/1.73
GFR SERPL CREATININE-BSD FRML MDRD: 11 ML/MIN/1.73
GFR SERPL CREATININE-BSD FRML MDRD: 11 ML/MIN/1.73
GFR SERPL CREATININE-BSD FRML MDRD: 12 ML/MIN/1.73
GFR SERPL CREATININE-BSD FRML MDRD: 6 ML/MIN/1.73
GFR SERPL CREATININE-BSD FRML MDRD: 6 ML/MIN/1.73
GFR SERPL CREATININE-BSD FRML MDRD: 7 ML/MIN/1.73
GFR SERPL CREATININE-BSD FRML MDRD: 8 ML/MIN/1.73
GFR SERPL CREATININE-BSD FRML MDRD: 9 ML/MIN/1.73
GLOBULIN SER CALC-MCNC: 3.3 GM/DL
GLOBULIN UR ELPH-MCNC: 3.4 GM/DL
GLOBULIN UR ELPH-MCNC: 4 GM/DL
GLOBULIN UR ELPH-MCNC: 4.2 GM/DL
GLUCOSE BLD-MCNC: 104 MG/DL (ref 65–99)
GLUCOSE BLD-MCNC: 107 MG/DL (ref 65–99)
GLUCOSE BLD-MCNC: 112 MG/DL (ref 65–99)
GLUCOSE BLD-MCNC: 113 MG/DL (ref 65–99)
GLUCOSE BLD-MCNC: 114 MG/DL (ref 65–99)
GLUCOSE BLD-MCNC: 116 MG/DL (ref 65–99)
GLUCOSE BLD-MCNC: 134 MG/DL (ref 65–99)
GLUCOSE BLD-MCNC: 141 MG/DL (ref 65–99)
GLUCOSE BLD-MCNC: 155 MG/DL (ref 65–99)
GLUCOSE BLD-MCNC: 155 MG/DL (ref 65–99)
GLUCOSE BLD-MCNC: 159 MG/DL (ref 65–99)
GLUCOSE BLD-MCNC: 159 MG/DL (ref 65–99)
GLUCOSE BLD-MCNC: 160 MG/DL (ref 65–99)
GLUCOSE BLD-MCNC: 166 MG/DL (ref 65–99)
GLUCOSE BLD-MCNC: 188 MG/DL (ref 65–99)
GLUCOSE BLD-MCNC: 218 MG/DL (ref 65–99)
GLUCOSE BLD-MCNC: 83 MG/DL (ref 65–99)
GLUCOSE BLD-MCNC: 90 MG/DL (ref 65–99)
GLUCOSE BLD-MCNC: 96 MG/DL (ref 65–99)
GLUCOSE BLD-MCNC: 99 MG/DL (ref 65–99)
GLUCOSE BLDC GLUCOMTR-MCNC: 101 MG/DL (ref 70–130)
GLUCOSE BLDC GLUCOMTR-MCNC: 101 MG/DL (ref 70–130)
GLUCOSE BLDC GLUCOMTR-MCNC: 109 MG/DL (ref 70–130)
GLUCOSE BLDC GLUCOMTR-MCNC: 111 MG/DL (ref 70–130)
GLUCOSE BLDC GLUCOMTR-MCNC: 115 MG/DL (ref 70–130)
GLUCOSE BLDC GLUCOMTR-MCNC: 117 MG/DL (ref 70–130)
GLUCOSE BLDC GLUCOMTR-MCNC: 118 MG/DL (ref 70–130)
GLUCOSE BLDC GLUCOMTR-MCNC: 119 MG/DL (ref 70–130)
GLUCOSE BLDC GLUCOMTR-MCNC: 122 MG/DL (ref 70–130)
GLUCOSE BLDC GLUCOMTR-MCNC: 124 MG/DL (ref 70–130)
GLUCOSE BLDC GLUCOMTR-MCNC: 125 MG/DL (ref 70–130)
GLUCOSE BLDC GLUCOMTR-MCNC: 128 MG/DL (ref 70–130)
GLUCOSE BLDC GLUCOMTR-MCNC: 129 MG/DL (ref 70–130)
GLUCOSE BLDC GLUCOMTR-MCNC: 130 MG/DL (ref 70–130)
GLUCOSE BLDC GLUCOMTR-MCNC: 132 MG/DL (ref 70–130)
GLUCOSE BLDC GLUCOMTR-MCNC: 132 MG/DL (ref 70–130)
GLUCOSE BLDC GLUCOMTR-MCNC: 134 MG/DL (ref 70–130)
GLUCOSE BLDC GLUCOMTR-MCNC: 134 MG/DL (ref 70–130)
GLUCOSE BLDC GLUCOMTR-MCNC: 135 MG/DL (ref 70–130)
GLUCOSE BLDC GLUCOMTR-MCNC: 135 MG/DL (ref 70–130)
GLUCOSE BLDC GLUCOMTR-MCNC: 137 MG/DL (ref 70–130)
GLUCOSE BLDC GLUCOMTR-MCNC: 139 MG/DL (ref 70–130)
GLUCOSE BLDC GLUCOMTR-MCNC: 139 MG/DL (ref 70–130)
GLUCOSE BLDC GLUCOMTR-MCNC: 142 MG/DL (ref 70–130)
GLUCOSE BLDC GLUCOMTR-MCNC: 143 MG/DL (ref 70–130)
GLUCOSE BLDC GLUCOMTR-MCNC: 147 MG/DL (ref 70–130)
GLUCOSE BLDC GLUCOMTR-MCNC: 149 MG/DL (ref 70–130)
GLUCOSE BLDC GLUCOMTR-MCNC: 150 MG/DL (ref 70–130)
GLUCOSE BLDC GLUCOMTR-MCNC: 152 MG/DL (ref 70–130)
GLUCOSE BLDC GLUCOMTR-MCNC: 155 MG/DL (ref 70–130)
GLUCOSE BLDC GLUCOMTR-MCNC: 158 MG/DL (ref 70–130)
GLUCOSE BLDC GLUCOMTR-MCNC: 158 MG/DL (ref 70–130)
GLUCOSE BLDC GLUCOMTR-MCNC: 163 MG/DL (ref 70–130)
GLUCOSE BLDC GLUCOMTR-MCNC: 163 MG/DL (ref 70–130)
GLUCOSE BLDC GLUCOMTR-MCNC: 167 MG/DL (ref 70–130)
GLUCOSE BLDC GLUCOMTR-MCNC: 170 MG/DL (ref 70–130)
GLUCOSE BLDC GLUCOMTR-MCNC: 172 MG/DL (ref 70–130)
GLUCOSE BLDC GLUCOMTR-MCNC: 175 MG/DL (ref 70–130)
GLUCOSE BLDC GLUCOMTR-MCNC: 175 MG/DL (ref 70–130)
GLUCOSE BLDC GLUCOMTR-MCNC: 180 MG/DL (ref 70–130)
GLUCOSE BLDC GLUCOMTR-MCNC: 183 MG/DL (ref 70–130)
GLUCOSE BLDC GLUCOMTR-MCNC: 185 MG/DL (ref 70–130)
GLUCOSE BLDC GLUCOMTR-MCNC: 189 MG/DL (ref 70–130)
GLUCOSE BLDC GLUCOMTR-MCNC: 190 MG/DL (ref 70–130)
GLUCOSE BLDC GLUCOMTR-MCNC: 190 MG/DL (ref 70–130)
GLUCOSE BLDC GLUCOMTR-MCNC: 193 MG/DL (ref 70–130)
GLUCOSE BLDC GLUCOMTR-MCNC: 193 MG/DL (ref 70–130)
GLUCOSE BLDC GLUCOMTR-MCNC: 194 MG/DL (ref 70–130)
GLUCOSE BLDC GLUCOMTR-MCNC: 196 MG/DL (ref 70–130)
GLUCOSE BLDC GLUCOMTR-MCNC: 204 MG/DL (ref 70–130)
GLUCOSE BLDC GLUCOMTR-MCNC: 204 MG/DL (ref 70–130)
GLUCOSE BLDC GLUCOMTR-MCNC: 205 MG/DL (ref 70–130)
GLUCOSE BLDC GLUCOMTR-MCNC: 212 MG/DL (ref 70–130)
GLUCOSE BLDC GLUCOMTR-MCNC: 233 MG/DL (ref 70–130)
GLUCOSE BLDC GLUCOMTR-MCNC: 272 MG/DL (ref 70–130)
GLUCOSE BLDC GLUCOMTR-MCNC: 83 MG/DL (ref 70–130)
GLUCOSE BLDC GLUCOMTR-MCNC: 84 MG/DL (ref 70–130)
GLUCOSE BLDC GLUCOMTR-MCNC: 85 MG/DL (ref 70–130)
GLUCOSE BLDC GLUCOMTR-MCNC: 90 MG/DL (ref 70–130)
GLUCOSE BLDC GLUCOMTR-MCNC: 91 MG/DL (ref 70–130)
GLUCOSE BLDC GLUCOMTR-MCNC: 93 MG/DL (ref 70–130)
GLUCOSE BLDC GLUCOMTR-MCNC: 94 MG/DL (ref 70–130)
GLUCOSE BLDC GLUCOMTR-MCNC: 96 MG/DL (ref 70–130)
GLUCOSE BLDC GLUCOMTR-MCNC: 97 MG/DL (ref 70–130)
GLUCOSE BLDC GLUCOMTR-MCNC: 98 MG/DL (ref 70–130)
GLUCOSE BLDC GLUCOMTR-MCNC: 98 MG/DL (ref 70–130)
GLUCOSE BLDC GLUCOMTR-MCNC: 99 MG/DL (ref 70–130)
GLUCOSE BLDC GLUCOMTR-MCNC: 99 MG/DL (ref 70–130)
GLUCOSE SERPL-MCNC: 123 MG/DL (ref 65–99)
GRAM STN SPEC: ABNORMAL
GRAM STN SPEC: ABNORMAL
GRAM STN SPEC: NORMAL
HADV DNA SPEC NAA+PROBE: NOT DETECTED
HBA1C MFR BLD: 7.05 % (ref 4.8–5.6)
HBA1C MFR BLD: 7.95 % (ref 4.8–5.6)
HBA1C MFR BLD: 8.37 % (ref 4.8–5.6)
HBV CORE AB SER DONR QL IA: NEGATIVE
HBV SURFACE AB SER RIA-ACNC: REACTIVE
HBV SURFACE AG SERPL QL IA: NORMAL
HCO3 BLDA-SCNC: 17.5 MMOL/L (ref 22–28)
HCO3 BLDA-SCNC: 18.4 MMOL/L (ref 22–28)
HCO3 BLDA-SCNC: 23.2 MMOL/L (ref 22–28)
HCOV 229E RNA SPEC QL NAA+PROBE: NOT DETECTED
HCOV HKU1 RNA SPEC QL NAA+PROBE: NOT DETECTED
HCOV NL63 RNA SPEC QL NAA+PROBE: NOT DETECTED
HCOV OC43 RNA SPEC QL NAA+PROBE: NOT DETECTED
HCT VFR BLD AUTO: 32.6 % (ref 35.6–45.5)
HCT VFR BLD AUTO: 34.2 % (ref 35.6–45.5)
HCT VFR BLD AUTO: 34.7 % (ref 35.6–45.5)
HCT VFR BLD AUTO: 34.8 % (ref 35.6–45.5)
HCT VFR BLD AUTO: 35.2 % (ref 35.6–45.5)
HCT VFR BLD AUTO: 35.3 % (ref 35.6–45.5)
HCT VFR BLD AUTO: 35.5 % (ref 35.6–45.5)
HCT VFR BLD AUTO: 35.5 % (ref 35.6–45.5)
HCT VFR BLD AUTO: 36 % (ref 35.6–45.5)
HCT VFR BLD AUTO: 36.7 % (ref 35.6–45.5)
HCT VFR BLD AUTO: 36.8 % (ref 35.6–45.5)
HCT VFR BLD AUTO: 37.1 % (ref 35.6–45.5)
HCT VFR BLD AUTO: 37.6 % (ref 35.6–45.5)
HCT VFR BLD AUTO: 38.1 % (ref 35.6–45.5)
HCT VFR BLD AUTO: 38.3 % (ref 35.6–45.5)
HCT VFR BLD AUTO: 38.9 % (ref 35.6–45.5)
HCT VFR BLDA CALC: 37 % (ref 38–51)
HCT VFR BLDA CALC: 37 % (ref 38–51)
HCT VFR BLDA CALC: 38 % (ref 38–51)
HCT VFR BLDA CALC: 39 % (ref 38–51)
HDLC SERPL-MCNC: 37 MG/DL (ref 40–60)
HDLC SERPL-MCNC: 43 MG/DL (ref 40–60)
HGB BLD-MCNC: 10.8 G/DL (ref 11.9–15.5)
HGB BLD-MCNC: 11.1 G/DL (ref 11.9–15.5)
HGB BLD-MCNC: 11.2 G/DL (ref 11.9–15.5)
HGB BLD-MCNC: 11.4 G/DL (ref 11.9–15.5)
HGB BLD-MCNC: 11.5 G/DL (ref 11.9–15.5)
HGB BLD-MCNC: 11.6 G/DL (ref 11.9–15.5)
HGB BLD-MCNC: 11.6 G/DL (ref 11.9–15.5)
HGB BLD-MCNC: 11.7 G/DL (ref 11.9–15.5)
HGB BLD-MCNC: 11.8 G/DL (ref 11.9–15.5)
HGB BLD-MCNC: 11.9 G/DL (ref 11.9–15.5)
HGB BLD-MCNC: 12.1 G/DL (ref 11.9–15.5)
HGB BLD-MCNC: 12.4 G/DL (ref 11.9–15.5)
HGB BLD-MCNC: 12.8 G/DL (ref 11.9–15.5)
HGB BLD-MCNC: 12.9 G/DL (ref 11.9–15.5)
HGB BLDA-MCNC: 12.6 G/DL (ref 12–17)
HGB BLDA-MCNC: 12.6 G/DL (ref 12–17)
HGB BLDA-MCNC: 12.9 G/DL (ref 12–17)
HGB BLDA-MCNC: 13.3 G/DL (ref 12–17)
HMPV RNA NPH QL NAA+NON-PROBE: NOT DETECTED
HOLD SPECIMEN: NORMAL
HOROWITZ INDEX BLD+IHG-RTO: 100 %
HOROWITZ INDEX BLD+IHG-RTO: 30 %
HPIV1 RNA SPEC QL NAA+PROBE: NOT DETECTED
HPIV2 RNA SPEC QL NAA+PROBE: NOT DETECTED
HPIV3 RNA NPH QL NAA+PROBE: NOT DETECTED
HPIV4 P GENE NPH QL NAA+PROBE: NOT DETECTED
IMM GRANULOCYTES # BLD: 0.04 10*3/MM3 (ref 0–0.03)
IMM GRANULOCYTES # BLD: 0.05 10*3/MM3 (ref 0–0.03)
IMM GRANULOCYTES # BLD: 0.05 10*3/MM3 (ref 0–0.03)
IMM GRANULOCYTES # BLD: 0.06 10*3/MM3 (ref 0–0.03)
IMM GRANULOCYTES # BLD: 0.13 10*3/MM3 (ref 0–0.03)
IMM GRANULOCYTES # BLD: 0.17 10*3/MM3 (ref 0–0.03)
IMM GRANULOCYTES # BLD: 0.17 10*3/MM3 (ref 0–0.03)
IMM GRANULOCYTES # BLD: 0.19 10*3/MM3 (ref 0–0.03)
IMM GRANULOCYTES NFR BLD: 0.5 % (ref 0–0.5)
IMM GRANULOCYTES NFR BLD: 0.6 % (ref 0–0.5)
IMM GRANULOCYTES NFR BLD: 0.7 % (ref 0–0.5)
IMM GRANULOCYTES NFR BLD: 0.7 % (ref 0–0.5)
IMM GRANULOCYTES NFR BLD: 0.8 % (ref 0–0.5)
IMM GRANULOCYTES NFR BLD: 1 % (ref 0–0.5)
IMM GRANULOCYTES NFR BLD: 1.4 % (ref 0–0.5)
IMM GRANULOCYTES NFR BLD: 1.6 % (ref 0–0.5)
INR PPP: 1.87 (ref 0.9–1.1)
INR PPP: 2 (ref 0.8–1.2)
INR PPP: 2 (ref 0.9–1.1)
INR PPP: 2.01 (ref 0.9–1.1)
INR PPP: 2.03 (ref 0.9–1.1)
INR PPP: 2.12 (ref 0.9–1.1)
INR PPP: 2.17 (ref 0.9–1.1)
INR PPP: 2.19 (ref 0.9–1.1)
INR PPP: 2.26 (ref 0.9–1.1)
INR PPP: 2.27 (ref 0.9–1.1)
INR PPP: 2.28 (ref 0.9–1.1)
INR PPP: 2.33 (ref 0.9–1.1)
INR PPP: 2.42 (ref 0.9–1.1)
INR PPP: 2.53 (ref 0.9–1.1)
INR PPP: 2.53 (ref 0.9–1.1)
INR PPP: 2.68 (ref 0.9–1.1)
INR PPP: 2.71 (ref 0.9–1.1)
INR PPP: 2.72 (ref 0.9–1.1)
INR PPP: 2.86 (ref 0.9–1.1)
INR PPP: 3.06 (ref 0.9–1.1)
INR PPP: 3.54 (ref 0.9–1.1)
INR PPP: 3.57 (ref 0.9–1.1)
INR PPP: 4.35 (ref 0.9–1.1)
INR PPP: 4.54 (ref 0.9–1.1)
LARGE PLATELETS: ABNORMAL
LDLC SERPL CALC-MCNC: 100 MG/DL (ref 0–100)
LDLC SERPL CALC-MCNC: 42 MG/DL (ref 0–100)
LDLC/HDLC SERPL: 2.7 {RATIO}
LEFT ATRIUM VOLUME INDEX: 28 ML/M2
LEFT ATRIUM VOLUME: 52 CM3
LV EF 2D ECHO EST: 35 %
LV EF 2D ECHO EST: 51 %
LYMPHOCYTES # BLD AUTO: 0.2 10*3/MM3 (ref 0.9–4.8)
LYMPHOCYTES # BLD AUTO: 0.88 10*3/MM3 (ref 0.9–4.8)
LYMPHOCYTES # BLD AUTO: 1.01 10*3/MM3 (ref 0.9–4.8)
LYMPHOCYTES # BLD AUTO: 1.06 10*3/MM3 (ref 0.9–4.8)
LYMPHOCYTES # BLD AUTO: 1.1 10*3/MM3 (ref 0.9–4.8)
LYMPHOCYTES # BLD AUTO: 1.14 10*3/MM3 (ref 0.9–4.8)
LYMPHOCYTES # BLD AUTO: 1.17 10*3/MM3 (ref 0.9–4.8)
LYMPHOCYTES # BLD AUTO: 1.25 10*3/MM3 (ref 0.9–4.8)
LYMPHOCYTES # BLD MANUAL: 0.86 10*3/MM3 (ref 0.9–4.8)
LYMPHOCYTES # BLD MANUAL: 1.27 10*3/MM3 (ref 0.9–4.8)
LYMPHOCYTES # BLD MANUAL: 1.61 10*3/MM3 (ref 0.9–4.8)
LYMPHOCYTES # BLD MANUAL: 1.62 10*3/MM3 (ref 0.9–4.8)
LYMPHOCYTES # BLD MANUAL: 1.7 10*3/MM3 (ref 0.9–4.8)
LYMPHOCYTES # BLD MANUAL: 2.17 10*3/MM3 (ref 0.9–4.8)
LYMPHOCYTES # BLD MANUAL: 2.3 10*3/MM3 (ref 0.9–4.8)
LYMPHOCYTES NFR BLD AUTO: 0.9 % (ref 19.6–45.3)
LYMPHOCYTES NFR BLD AUTO: 10.4 % (ref 19.6–45.3)
LYMPHOCYTES NFR BLD AUTO: 13.7 % (ref 19.6–45.3)
LYMPHOCYTES NFR BLD AUTO: 14.5 % (ref 19.6–45.3)
LYMPHOCYTES NFR BLD AUTO: 15.9 % (ref 19.6–45.3)
LYMPHOCYTES NFR BLD AUTO: 8.1 % (ref 19.6–45.3)
LYMPHOCYTES NFR BLD AUTO: 9.2 % (ref 19.6–45.3)
LYMPHOCYTES NFR BLD AUTO: 9.9 % (ref 19.6–45.3)
LYMPHOCYTES NFR BLD MANUAL: 1 % (ref 5–12)
LYMPHOCYTES NFR BLD MANUAL: 10 % (ref 19.6–45.3)
LYMPHOCYTES NFR BLD MANUAL: 11 % (ref 5–12)
LYMPHOCYTES NFR BLD MANUAL: 12 % (ref 19.6–45.3)
LYMPHOCYTES NFR BLD MANUAL: 14 % (ref 19.6–45.3)
LYMPHOCYTES NFR BLD MANUAL: 18 % (ref 19.6–45.3)
LYMPHOCYTES NFR BLD MANUAL: 20 % (ref 19.6–45.3)
LYMPHOCYTES NFR BLD MANUAL: 21 % (ref 19.6–45.3)
LYMPHOCYTES NFR BLD MANUAL: 23 % (ref 19.6–45.3)
LYMPHOCYTES NFR BLD MANUAL: 3 % (ref 5–12)
LYMPHOCYTES NFR BLD MANUAL: 4 % (ref 5–12)
LYMPHOCYTES NFR BLD MANUAL: 6 % (ref 5–12)
M PNEUMO IGG SER IA-ACNC: NOT DETECTED
MAGNESIUM SERPL-MCNC: 1.9 MG/DL (ref 1.6–2.4)
MAGNESIUM SERPL-MCNC: 2 MG/DL (ref 1.6–2.4)
MAGNESIUM SERPL-MCNC: 2.1 MG/DL (ref 1.6–2.4)
MAGNESIUM SERPL-MCNC: 2.4 MG/DL (ref 1.6–2.4)
MAGNESIUM SERPL-MCNC: 2.5 MG/DL (ref 1.6–2.4)
MCH RBC QN AUTO: 29.4 PG (ref 26.9–32)
MCH RBC QN AUTO: 29.5 PG (ref 26.9–32)
MCH RBC QN AUTO: 29.6 PG (ref 26.9–32)
MCH RBC QN AUTO: 29.8 PG (ref 26.9–32)
MCH RBC QN AUTO: 29.8 PG (ref 26.9–32)
MCH RBC QN AUTO: 30 PG (ref 26.9–32)
MCH RBC QN AUTO: 30.1 PG (ref 26.9–32)
MCH RBC QN AUTO: 30.2 PG (ref 26.9–32)
MCH RBC QN AUTO: 30.2 PG (ref 26.9–32)
MCH RBC QN AUTO: 30.3 PG (ref 26.9–32)
MCH RBC QN AUTO: 30.3 PG (ref 26.9–32)
MCH RBC QN AUTO: 30.4 PG (ref 26.9–32)
MCH RBC QN AUTO: 30.7 PG (ref 26.9–32)
MCH RBC QN AUTO: 30.8 PG (ref 26.9–32)
MCHC RBC AUTO-ENTMCNC: 31.1 G/DL (ref 32.4–36.3)
MCHC RBC AUTO-ENTMCNC: 31.5 G/DL (ref 32.4–36.3)
MCHC RBC AUTO-ENTMCNC: 31.9 G/DL (ref 32.4–36.3)
MCHC RBC AUTO-ENTMCNC: 31.9 G/DL (ref 32.4–36.3)
MCHC RBC AUTO-ENTMCNC: 32.1 G/DL (ref 32.4–36.3)
MCHC RBC AUTO-ENTMCNC: 32.3 G/DL (ref 32.4–36.3)
MCHC RBC AUTO-ENTMCNC: 32.4 G/DL (ref 32.4–36.3)
MCHC RBC AUTO-ENTMCNC: 32.5 G/DL (ref 32.4–36.3)
MCHC RBC AUTO-ENTMCNC: 32.6 G/DL (ref 32.4–36.3)
MCHC RBC AUTO-ENTMCNC: 32.7 G/DL (ref 32.4–36.3)
MCHC RBC AUTO-ENTMCNC: 32.8 G/DL (ref 32.4–36.3)
MCHC RBC AUTO-ENTMCNC: 33 G/DL (ref 32.4–36.3)
MCHC RBC AUTO-ENTMCNC: 33.1 G/DL (ref 32.4–36.3)
MCHC RBC AUTO-ENTMCNC: 33.2 G/DL (ref 32.4–36.3)
MCHC RBC AUTO-ENTMCNC: 33.4 G/DL (ref 32.4–36.3)
MCHC RBC AUTO-ENTMCNC: 33.6 G/DL (ref 32.4–36.3)
MCV RBC AUTO: 87.8 FL (ref 80.5–98.2)
MCV RBC AUTO: 90.3 FL (ref 80.5–98.2)
MCV RBC AUTO: 90.5 FL (ref 80.5–98.2)
MCV RBC AUTO: 90.7 FL (ref 80.5–98.2)
MCV RBC AUTO: 91.8 FL (ref 80.5–98.2)
MCV RBC AUTO: 91.8 FL (ref 80.5–98.2)
MCV RBC AUTO: 91.9 FL (ref 80.5–98.2)
MCV RBC AUTO: 92.2 FL (ref 80.5–98.2)
MCV RBC AUTO: 92.6 FL (ref 80.5–98.2)
MCV RBC AUTO: 92.9 FL (ref 80.5–98.2)
MCV RBC AUTO: 93.5 FL (ref 80.5–98.2)
MCV RBC AUTO: 95 FL (ref 80.5–98.2)
MCV RBC AUTO: 96.3 FL (ref 80.5–98.2)
MCV RBC AUTO: 97.2 FL (ref 80.5–98.2)
METAMYELOCYTES NFR BLD MANUAL: 1 % (ref 0–0)
METAMYELOCYTES NFR BLD MANUAL: 1 % (ref 0–0)
METAMYELOCYTES NFR BLD MANUAL: 2 % (ref 0–0)
MODALITY: ABNORMAL
MONOCYTES # BLD AUTO: 0.19 10*3/MM3 (ref 0.2–1.2)
MONOCYTES # BLD AUTO: 0.22 10*3/MM3 (ref 0.2–1.2)
MONOCYTES # BLD AUTO: 0.34 10*3/MM3 (ref 0.2–1.2)
MONOCYTES # BLD AUTO: 0.36 10*3/MM3 (ref 0.2–1.2)
MONOCYTES # BLD AUTO: 0.41 10*3/MM3 (ref 0.2–1.2)
MONOCYTES # BLD AUTO: 0.41 10*3/MM3 (ref 0.2–1.2)
MONOCYTES # BLD AUTO: 0.43 10*3/MM3 (ref 0.2–1.2)
MONOCYTES # BLD AUTO: 0.49 10*3/MM3 (ref 0.2–1.2)
MONOCYTES # BLD AUTO: 0.54 10*3/MM3 (ref 0.2–1.2)
MONOCYTES # BLD AUTO: 0.54 10*3/MM3 (ref 0.2–1.2)
MONOCYTES # BLD AUTO: 0.59 10*3/MM3 (ref 0.2–1.2)
MONOCYTES # BLD AUTO: 0.7 10*3/MM3 (ref 0.2–1.2)
MONOCYTES # BLD AUTO: 0.89 10*3/MM3 (ref 0.2–1.2)
MONOCYTES # BLD AUTO: 0.96 10*3/MM3 (ref 0.2–1.2)
MONOCYTES # BLD AUTO: 1.06 10*3/MM3 (ref 0.2–1.2)
MONOCYTES NFR BLD AUTO: 2.7 % (ref 5–12)
MONOCYTES NFR BLD AUTO: 3.3 % (ref 5–12)
MONOCYTES NFR BLD AUTO: 5.5 % (ref 5–12)
MONOCYTES NFR BLD AUTO: 5.8 % (ref 5–12)
MONOCYTES NFR BLD AUTO: 7.4 % (ref 5–12)
MONOCYTES NFR BLD AUTO: 8.1 % (ref 5–12)
MONOCYTES NFR BLD AUTO: 8.6 % (ref 5–12)
MONOCYTES NFR BLD AUTO: 8.7 % (ref 5–12)
MYELOCYTES NFR BLD MANUAL: 1 % (ref 0–0)
NEUTROPHILS # BLD AUTO: 10 10*3/MM3 (ref 1.9–8.1)
NEUTROPHILS # BLD AUTO: 10.89 10*3/MM3 (ref 1.9–8.1)
NEUTROPHILS # BLD AUTO: 15.91 10*3/MM3 (ref 1.9–8.1)
NEUTROPHILS # BLD AUTO: 21.19 10*3/MM3 (ref 1.9–8.1)
NEUTROPHILS # BLD AUTO: 5.34 10*3/MM3 (ref 1.9–8.1)
NEUTROPHILS # BLD AUTO: 5.39 10*3/MM3 (ref 1.9–8.1)
NEUTROPHILS # BLD AUTO: 5.53 10*3/MM3 (ref 1.9–8.1)
NEUTROPHILS # BLD AUTO: 5.98 10*3/MM3 (ref 1.9–8.1)
NEUTROPHILS # BLD AUTO: 6.02 10*3/MM3 (ref 1.9–8.1)
NEUTROPHILS # BLD AUTO: 6.82 10*3/MM3 (ref 1.9–8.1)
NEUTROPHILS # BLD AUTO: 6.97 10*3/MM3 (ref 1.9–8.1)
NEUTROPHILS # BLD AUTO: 7.05 10*3/MM3 (ref 1.9–8.1)
NEUTROPHILS # BLD AUTO: 7.3 10*3/MM3 (ref 1.9–8.1)
NEUTROPHILS # BLD AUTO: 7.64 10*3/MM3 (ref 1.9–8.1)
NEUTROPHILS # BLD AUTO: 9.36 10*3/MM3 (ref 1.9–8.1)
NEUTROPHILS NFR BLD AUTO: 74.6 % (ref 42.7–76)
NEUTROPHILS NFR BLD AUTO: 75.5 % (ref 42.7–76)
NEUTROPHILS NFR BLD AUTO: 76.4 % (ref 42.7–76)
NEUTROPHILS NFR BLD AUTO: 79.2 % (ref 42.7–76)
NEUTROPHILS NFR BLD AUTO: 80.6 % (ref 42.7–76)
NEUTROPHILS NFR BLD AUTO: 82.2 % (ref 42.7–76)
NEUTROPHILS NFR BLD AUTO: 87.1 % (ref 42.7–76)
NEUTROPHILS NFR BLD AUTO: 95.5 % (ref 42.7–76)
NEUTROPHILS NFR BLD MANUAL: 67 % (ref 42.7–76)
NEUTROPHILS NFR BLD MANUAL: 72 % (ref 42.7–76)
NEUTROPHILS NFR BLD MANUAL: 74 % (ref 42.7–76)
NEUTROPHILS NFR BLD MANUAL: 76 % (ref 42.7–76)
NEUTROPHILS NFR BLD MANUAL: 78 % (ref 42.7–76)
NEUTROPHILS NFR BLD MANUAL: 83 % (ref 42.7–76)
NEUTROPHILS NFR BLD MANUAL: 85 % (ref 42.7–76)
NEUTS HYPERSEG # BLD: ABNORMAL 10*3/UL
NRBC BLD MANUAL-RTO: 0.4 /100 WBC (ref 0–0)
NRBC BLD MANUAL-RTO: 2 /100 WBC (ref 0–0)
NRBC SPEC MANUAL: 12 /100 WBC (ref 0–0)
NRBC SPEC MANUAL: 14 /100 WBC (ref 0–0)
NRBC SPEC MANUAL: 15 /100 WBC (ref 0–0)
NRBC SPEC MANUAL: 6 /100 WBC (ref 0–0)
NRBC SPEC MANUAL: 7 /100 WBC (ref 0–0)
NRBC SPEC MANUAL: 9 /100 WBC (ref 0–0)
NRBC SPEC MANUAL: 9 /100 WBC (ref 0–0)
O2 A-A PPRESDIFF RESPIRATORY: 0.5 MMHG
O2 A-A PPRESDIFF RESPIRATORY: 0.6 MMHG
PCO2 BLDA: 23.5 MM HG (ref 35–45)
PCO2 BLDA: 30.5 MM HG (ref 35–45)
PCO2 BLDA: 40 MM HG (ref 35–45)
PEEP RESPIRATORY: 5 CM[H2O]
PEEP RESPIRATORY: 5 CM[H2O]
PH BLDA: 7.37 PH UNITS (ref 7.35–7.45)
PH BLDA: 7.39 PH UNITS (ref 7.35–7.45)
PH BLDA: 7.48 PH UNITS (ref 7.35–7.45)
PHOSPHATE SERPL-MCNC: 3.5 MG/DL (ref 2.5–4.5)
PHOSPHATE SERPL-MCNC: 4.1 MG/DL (ref 2.5–4.5)
PHOSPHATE SERPL-MCNC: 4.4 MG/DL (ref 2.5–4.5)
PHOSPHATE SERPL-MCNC: 4.5 MG/DL (ref 2.5–4.5)
PHOSPHATE SERPL-MCNC: 4.8 MG/DL (ref 2.5–4.5)
PHOSPHATE SERPL-MCNC: 5.5 MG/DL (ref 2.5–4.5)
PHOSPHATE SERPL-MCNC: 6 MG/DL (ref 2.5–4.5)
PHOSPHATE SERPL-MCNC: 7.2 MG/DL (ref 2.5–4.5)
PHOSPHATE SERPL-MCNC: 9.4 MG/DL (ref 2.5–4.5)
PLAT MORPH BLD: NORMAL
PLATELET # BLD AUTO: 160 10*3/MM3 (ref 140–500)
PLATELET # BLD AUTO: 171 10*3/MM3 (ref 140–500)
PLATELET # BLD AUTO: 178 10*3/MM3 (ref 140–500)
PLATELET # BLD AUTO: 187 10*3/MM3 (ref 140–500)
PLATELET # BLD AUTO: 192 10*3/MM3 (ref 140–500)
PLATELET # BLD AUTO: 197 10*3/MM3 (ref 140–500)
PLATELET # BLD AUTO: 198 10*3/MM3 (ref 140–500)
PLATELET # BLD AUTO: 198 10*3/MM3 (ref 140–500)
PLATELET # BLD AUTO: 200 10*3/MM3 (ref 140–500)
PLATELET # BLD AUTO: 200 10*3/MM3 (ref 140–500)
PLATELET # BLD AUTO: 205 10*3/MM3 (ref 140–500)
PLATELET # BLD AUTO: 211 10*3/MM3 (ref 140–500)
PLATELET # BLD AUTO: 213 10*3/MM3 (ref 140–500)
PLATELET # BLD AUTO: 214 10*3/MM3 (ref 140–500)
PLATELET # BLD AUTO: 215 10*3/MM3 (ref 140–500)
PLATELET # BLD AUTO: 242 10*3/MM3 (ref 140–500)
PMV BLD AUTO: 10.1 FL (ref 6–12)
PMV BLD AUTO: 10.2 FL (ref 6–12)
PMV BLD AUTO: 10.3 FL (ref 6–12)
PMV BLD AUTO: 10.5 FL (ref 6–12)
PMV BLD AUTO: 10.5 FL (ref 6–12)
PMV BLD AUTO: 10.6 FL (ref 6–12)
PMV BLD AUTO: 10.6 FL (ref 6–12)
PMV BLD AUTO: 10.7 FL (ref 6–12)
PMV BLD AUTO: 10.8 FL (ref 6–12)
PMV BLD AUTO: 10.9 FL (ref 6–12)
PMV BLD AUTO: 11.4 FL (ref 6–12)
PMV BLD AUTO: 9.9 FL (ref 6–12)
PO2 BLDA: 417.5 MM HG (ref 80–100)
PO2 BLDA: 87.9 MM HG (ref 80–100)
PO2 BLDA: 99.5 MM HG (ref 80–100)
POIKILOCYTOSIS BLD QL SMEAR: ABNORMAL
POLYCHROMASIA BLD QL SMEAR: ABNORMAL
POTASSIUM BLD-SCNC: 2.9 MMOL/L (ref 3.5–5.2)
POTASSIUM BLD-SCNC: 3.3 MMOL/L (ref 3.5–5.2)
POTASSIUM BLD-SCNC: 3.4 MMOL/L (ref 3.5–5.2)
POTASSIUM BLD-SCNC: 3.8 MMOL/L (ref 3.5–5.2)
POTASSIUM BLD-SCNC: 4 MMOL/L (ref 3.5–5.2)
POTASSIUM BLD-SCNC: 4.1 MMOL/L (ref 3.5–5.2)
POTASSIUM BLD-SCNC: 4.2 MMOL/L (ref 3.5–5.2)
POTASSIUM BLD-SCNC: 4.2 MMOL/L (ref 3.5–5.2)
POTASSIUM BLD-SCNC: 4.3 MMOL/L (ref 3.5–5.2)
POTASSIUM BLD-SCNC: 4.3 MMOL/L (ref 3.5–5.2)
POTASSIUM BLD-SCNC: 4.4 MMOL/L (ref 3.5–5.2)
POTASSIUM BLD-SCNC: 4.5 MMOL/L (ref 3.5–5.2)
POTASSIUM BLD-SCNC: 4.6 MMOL/L (ref 3.5–5.2)
POTASSIUM BLD-SCNC: 4.6 MMOL/L (ref 3.5–5.2)
POTASSIUM BLD-SCNC: 4.9 MMOL/L (ref 3.5–5.2)
POTASSIUM BLD-SCNC: 5 MMOL/L (ref 3.5–5.2)
POTASSIUM SERPL-SCNC: 4.3 MMOL/L (ref 3.5–5.2)
PROCALCITONIN SERPL-MCNC: 3.09 NG/ML (ref 0.1–0.25)
PROCALCITONIN SERPL-MCNC: 3.68 NG/ML (ref 0.1–0.25)
PROT SERPL-MCNC: 7.1 G/DL (ref 6–8.5)
PROT SERPL-MCNC: 7.3 G/DL (ref 6–8.5)
PROT SERPL-MCNC: 7.4 G/DL (ref 6–8.5)
PROT SERPL-MCNC: 8.1 G/DL (ref 6–8.5)
PROTHROMBIN TIME: 20.9 SECONDS (ref 11.7–14.2)
PROTHROMBIN TIME: 22.1 SECONDS (ref 11.7–14.2)
PROTHROMBIN TIME: 22.2 SECONDS (ref 11.7–14.2)
PROTHROMBIN TIME: 23 SECONDS (ref 11.7–14.2)
PROTHROMBIN TIME: 23.4 SECONDS
PROTHROMBIN TIME: 23.4 SECONDS (ref 11.7–14.2)
PROTHROMBIN TIME: 23.4 SECONDS (ref 12.8–15.2)
PROTHROMBIN TIME: 23.6 SECONDS (ref 11.7–14.2)
PROTHROMBIN TIME: 24.2 SECONDS (ref 11.7–14.2)
PROTHROMBIN TIME: 24.3 SECONDS (ref 11.7–14.2)
PROTHROMBIN TIME: 24.4 SECONDS (ref 11.7–14.2)
PROTHROMBIN TIME: 24.7 SECONDS (ref 11.7–14.2)
PROTHROMBIN TIME: 25.5 SECONDS (ref 11.7–14.2)
PROTHROMBIN TIME: 26.4 SECONDS (ref 11.7–14.2)
PROTHROMBIN TIME: 26.4 SECONDS (ref 11.7–14.2)
PROTHROMBIN TIME: 27.7 SECONDS (ref 11.7–14.2)
PROTHROMBIN TIME: 27.9 SECONDS (ref 11.7–14.2)
PROTHROMBIN TIME: 28 SECONDS (ref 11.7–14.2)
PROTHROMBIN TIME: 29.1 SECONDS (ref 11.7–14.2)
PROTHROMBIN TIME: 30.6 SECONDS (ref 11.7–14.2)
PROTHROMBIN TIME: 34.4 SECONDS (ref 11.7–14.2)
PROTHROMBIN TIME: 34.6 SECONDS (ref 11.7–14.2)
PROTHROMBIN TIME: 40.4 SECONDS (ref 11.7–14.2)
PROTHROMBIN TIME: 41.7 SECONDS (ref 11.7–14.2)
RBC # BLD AUTO: 3.52 10*6/MM3 (ref 3.9–5.2)
RBC # BLD AUTO: 3.71 10*6/MM3 (ref 3.9–5.2)
RBC # BLD AUTO: 3.72 10*6/MM3 (ref 3.9–5.2)
RBC # BLD AUTO: 3.79 10*6/MM3 (ref 3.9–5.2)
RBC # BLD AUTO: 3.79 10*6/MM3 (ref 3.9–5.2)
RBC # BLD AUTO: 3.82 10*6/MM3 (ref 3.9–5.2)
RBC # BLD AUTO: 3.83 10*6/MM3 (ref 3.9–5.2)
RBC # BLD AUTO: 3.85 10*6/MM3 (ref 3.9–5.2)
RBC # BLD AUTO: 3.93 10*6/MM3 (ref 3.9–5.2)
RBC # BLD AUTO: 3.94 10*6/MM3 (ref 3.9–5.2)
RBC # BLD AUTO: 3.95 10*6/MM3 (ref 3.9–5.2)
RBC # BLD AUTO: 4.02 10*6/MM3 (ref 3.9–5.2)
RBC # BLD AUTO: 4.09 10*6/MM3 (ref 3.9–5.2)
RBC # BLD AUTO: 4.09 10*6/MM3 (ref 3.9–5.2)
RBC # BLD AUTO: 4.15 10*6/MM3 (ref 3.9–5.2)
RBC # BLD AUTO: 4.3 10*6/MM3 (ref 3.9–5.2)
RBC MORPH BLD: NORMAL
RHINOVIRUS RNA SPEC NAA+PROBE: NOT DETECTED
RSV RNA NPH QL NAA+NON-PROBE: NOT DETECTED
SAO2 % BLDA: 60 % (ref 95–98)
SAO2 % BLDA: 98 % (ref 95–98)
SAO2 % BLDA: 99 % (ref 95–98)
SAO2 % BLDA: 99 % (ref 95–98)
SAO2 % BLDCOA: 100 % (ref 92–99)
SAO2 % BLDCOA: 96.8 % (ref 92–99)
SAO2 % BLDCOA: 98.3 % (ref 92–99)
SCAN SLIDE: NORMAL
SCHISTOCYTES BLD QL SMEAR: ABNORMAL
SET MECH RESP RATE: 18
SODIUM BLD-SCNC: 129 MMOL/L (ref 136–145)
SODIUM BLD-SCNC: 132 MMOL/L (ref 136–145)
SODIUM BLD-SCNC: 133 MMOL/L (ref 136–145)
SODIUM BLD-SCNC: 133 MMOL/L (ref 136–145)
SODIUM BLD-SCNC: 134 MMOL/L (ref 136–145)
SODIUM BLD-SCNC: 136 MMOL/L (ref 136–145)
SODIUM BLD-SCNC: 137 MMOL/L (ref 136–145)
SODIUM BLD-SCNC: 137 MMOL/L (ref 136–145)
SODIUM BLD-SCNC: 138 MMOL/L (ref 136–145)
SODIUM BLD-SCNC: 140 MMOL/L (ref 136–145)
SODIUM BLD-SCNC: 142 MMOL/L (ref 136–145)
SODIUM SERPL-SCNC: 140 MMOL/L (ref 136–145)
SPHEROCYTES BLD QL SMEAR: ABNORMAL
T3FREE SERPL-MCNC: 2.2 PG/ML (ref 2–4.4)
T3RU NFR SERPL: 31 % (ref 24–39)
T4 FREE SERPL-MCNC: 1.37 NG/DL (ref 0.93–1.7)
T4 SERPL-MCNC: 6.5 UG/DL (ref 4.5–12)
TARGETS BLD QL SMEAR: ABNORMAL
TOTAL RATE: 18 BREATHS/MINUTE
TOTAL RATE: 18 BREATHS/MINUTE
TRIGL SERPL-MCNC: 75 MG/DL (ref 0–150)
TRIGL SERPL-MCNC: 75 MG/DL (ref 0–150)
TROPONIN T SERPL-MCNC: 0.08 NG/ML (ref 0–0.03)
TROPONIN T SERPL-MCNC: 0.08 NG/ML (ref 0–0.03)
TROPONIN T SERPL-MCNC: 0.09 NG/ML (ref 0–0.03)
TROPONIN T SERPL-MCNC: 0.1 NG/ML (ref 0–0.03)
TROPONIN T SERPL-MCNC: 0.11 NG/ML (ref 0–0.03)
TROPONIN T SERPL-MCNC: 0.12 NG/ML (ref 0–0.03)
TROPONIN T SERPL-MCNC: 0.13 NG/ML (ref 0–0.03)
TROPONIN T SERPL-MCNC: 0.93 NG/ML (ref 0–0.03)
TROPONIN T SERPL-MCNC: 0.93 NG/ML (ref 0–0.03)
TSH SERPL DL<=0.005 MIU/L-ACNC: 2.22 UIU/ML (ref 0.45–4.5)
TSH SERPL DL<=0.05 MIU/L-ACNC: 3.65 MIU/ML (ref 0.27–4.2)
VANCOMYCIN SERPL-MCNC: 20.8 MCG/ML (ref 5–40)
VANCOMYCIN SERPL-MCNC: 28.2 MCG/ML (ref 5–40)
VENTILATOR MODE: ABNORMAL
VENTILATOR MODE: ABNORMAL
VLDLC SERPL CALC-MCNC: 15 MG/DL (ref 5–40)
VLDLC SERPL-MCNC: 15 MG/DL (ref 5–40)
VT ON VENT VENT: 500 ML
WBC MORPH BLD: NORMAL
WBC NRBC COR # BLD: 10.32 10*3/MM3 (ref 4.5–10.7)
WBC NRBC COR # BLD: 11.82 10*3/MM3 (ref 4.5–10.7)
WBC NRBC COR # BLD: 12.39 10*3/MM3 (ref 4.5–10.7)
WBC NRBC COR # BLD: 12.5 10*3/MM3 (ref 4.5–10.7)
WBC NRBC COR # BLD: 19.17 10*3/MM3 (ref 4.5–10.7)
WBC NRBC COR # BLD: 22.17 10*3/MM3 (ref 4.5–10.7)
WBC NRBC COR # BLD: 7.32 10*3/MM3 (ref 4.5–10.7)
WBC NRBC COR # BLD: 7.41 10*3/MM3 (ref 4.5–10.7)
WBC NRBC COR # BLD: 7.87 10*3/MM3 (ref 4.5–10.7)
WBC NRBC COR # BLD: 8.03 10*3/MM3 (ref 4.5–10.7)
WBC NRBC COR # BLD: 8.05 10*3/MM3 (ref 4.5–10.7)
WBC NRBC COR # BLD: 8.48 10*3/MM3 (ref 4.5–10.7)
WBC NRBC COR # BLD: 8.59 10*3/MM3 (ref 4.5–10.7)
WBC NRBC COR # BLD: 8.98 10*3/MM3 (ref 4.5–10.7)
WBC NRBC COR # BLD: 9.04 10*3/MM3 (ref 4.5–10.7)
WBC NRBC COR # BLD: 9.38 10*3/MM3 (ref 4.5–10.7)
WHOLE BLOOD HOLD SPECIMEN: NORMAL
WHOLE BLOOD HOLD SPECIMEN: NORMAL

## 2017-01-01 PROCEDURE — 86704 HEP B CORE ANTIBODY TOTAL: CPT | Performed by: INTERNAL MEDICINE

## 2017-01-01 PROCEDURE — 25010000002 HEPARIN (PORCINE) PER 1000 UNITS: Performed by: INTERNAL MEDICINE

## 2017-01-01 PROCEDURE — 63710000001 INSULIN ASPART PER 5 UNITS: Performed by: HOSPITALIST

## 2017-01-01 PROCEDURE — 70450 CT HEAD/BRAIN W/O DYE: CPT

## 2017-01-01 PROCEDURE — G0378 HOSPITAL OBSERVATION PER HR: HCPCS

## 2017-01-01 PROCEDURE — 82962 GLUCOSE BLOOD TEST: CPT

## 2017-01-01 PROCEDURE — 25010000002 PIPERACILLIN SOD-TAZOBACTAM PER 1 G: Performed by: INTERNAL MEDICINE

## 2017-01-01 PROCEDURE — 83605 ASSAY OF LACTIC ACID: CPT | Performed by: INTERNAL MEDICINE

## 2017-01-01 PROCEDURE — 94799 UNLISTED PULMONARY SVC/PX: CPT

## 2017-01-01 PROCEDURE — 25010000002 VITAMIN K1 PER 1 MG: Performed by: INTERNAL MEDICINE

## 2017-01-01 PROCEDURE — 83735 ASSAY OF MAGNESIUM: CPT | Performed by: EMERGENCY MEDICINE

## 2017-01-01 PROCEDURE — 25010000002 FENTANYL CITRATE (PF) 100 MCG/2ML SOLUTION: Performed by: INTERNAL MEDICINE

## 2017-01-01 PROCEDURE — 25010000002 PROPOFOL 1000 MG/ML EMULSION: Performed by: INTERNAL MEDICINE

## 2017-01-01 PROCEDURE — 4A023N8 MEASUREMENT OF CARDIAC SAMPLING AND PRESSURE, BILATERAL, PERCUTANEOUS APPROACH: ICD-10-PCS | Performed by: INTERNAL MEDICINE

## 2017-01-01 PROCEDURE — 71010 HC CHEST PA OR AP: CPT

## 2017-01-01 PROCEDURE — G8980 MOBILITY D/C STATUS: HCPCS

## 2017-01-01 PROCEDURE — 85730 THROMBOPLASTIN TIME PARTIAL: CPT | Performed by: INTERNAL MEDICINE

## 2017-01-01 PROCEDURE — C1894 INTRO/SHEATH, NON-LASER: HCPCS | Performed by: INTERNAL MEDICINE

## 2017-01-01 PROCEDURE — 80048 BASIC METABOLIC PNL TOTAL CA: CPT | Performed by: INTERNAL MEDICINE

## 2017-01-01 PROCEDURE — 85610 PROTHROMBIN TIME: CPT | Performed by: INTERNAL MEDICINE

## 2017-01-01 PROCEDURE — 84484 ASSAY OF TROPONIN QUANT: CPT | Performed by: EMERGENCY MEDICINE

## 2017-01-01 PROCEDURE — 96372 THER/PROPH/DIAG INJ SC/IM: CPT

## 2017-01-01 PROCEDURE — 99232 SBSQ HOSP IP/OBS MODERATE 35: CPT | Performed by: INTERNAL MEDICINE

## 2017-01-01 PROCEDURE — 99233 SBSQ HOSP IP/OBS HIGH 50: CPT | Performed by: INTERNAL MEDICINE

## 2017-01-01 PROCEDURE — 85025 COMPLETE CBC W/AUTO DIFF WBC: CPT | Performed by: INTERNAL MEDICINE

## 2017-01-01 PROCEDURE — 93000 ELECTROCARDIOGRAM COMPLETE: CPT | Performed by: INTERNAL MEDICINE

## 2017-01-01 PROCEDURE — 84443 ASSAY THYROID STIM HORMONE: CPT | Performed by: INTERNAL MEDICINE

## 2017-01-01 PROCEDURE — 87486 CHLMYD PNEUM DNA AMP PROBE: CPT | Performed by: HOSPITALIST

## 2017-01-01 PROCEDURE — 87340 HEPATITIS B SURFACE AG IA: CPT | Performed by: INTERNAL MEDICINE

## 2017-01-01 PROCEDURE — 94640 AIRWAY INHALATION TREATMENT: CPT

## 2017-01-01 PROCEDURE — 85610 PROTHROMBIN TIME: CPT | Performed by: EMERGENCY MEDICINE

## 2017-01-01 PROCEDURE — 80053 COMPREHEN METABOLIC PANEL: CPT | Performed by: EMERGENCY MEDICINE

## 2017-01-01 PROCEDURE — 25010000002 MORPHINE PER 10 MG: Performed by: INTERNAL MEDICINE

## 2017-01-01 PROCEDURE — 25010000002 LORAZEPAM PER 2 MG: Performed by: INTERNAL MEDICINE

## 2017-01-01 PROCEDURE — 85027 COMPLETE CBC AUTOMATED: CPT | Performed by: INTERNAL MEDICINE

## 2017-01-01 PROCEDURE — 82330 ASSAY OF CALCIUM: CPT | Performed by: INTERNAL MEDICINE

## 2017-01-01 PROCEDURE — 85018 HEMOGLOBIN: CPT

## 2017-01-01 PROCEDURE — 93321 DOPPLER ECHO F-UP/LMTD STD: CPT

## 2017-01-01 PROCEDURE — G0257 UNSCHED DIALYSIS ESRD PT HOS: HCPCS

## 2017-01-01 PROCEDURE — G8979 MOBILITY GOAL STATUS: HCPCS

## 2017-01-01 PROCEDURE — 84484 ASSAY OF TROPONIN QUANT: CPT | Performed by: INTERNAL MEDICINE

## 2017-01-01 PROCEDURE — 99153 MOD SED SAME PHYS/QHP EA: CPT | Performed by: INTERNAL MEDICINE

## 2017-01-01 PROCEDURE — 25010000002 SUCCINYLCHOLINE PER 20 MG: Performed by: ANESTHESIOLOGY

## 2017-01-01 PROCEDURE — 93308 TTE F-UP OR LMTD: CPT

## 2017-01-01 PROCEDURE — 99285 EMERGENCY DEPT VISIT HI MDM: CPT

## 2017-01-01 PROCEDURE — 99284 EMERGENCY DEPT VISIT MOD MDM: CPT

## 2017-01-01 PROCEDURE — 85610 PROTHROMBIN TIME: CPT | Performed by: HOSPITALIST

## 2017-01-01 PROCEDURE — 25010000002 VANCOMYCIN: Performed by: EMERGENCY MEDICINE

## 2017-01-01 PROCEDURE — 06HY33Z INSERTION OF INFUSION DEVICE INTO LOWER VEIN, PERCUTANEOUS APPROACH: ICD-10-PCS | Performed by: INTERNAL MEDICINE

## 2017-01-01 PROCEDURE — 83735 ASSAY OF MAGNESIUM: CPT | Performed by: INTERNAL MEDICINE

## 2017-01-01 PROCEDURE — 0 IOPAMIDOL PER 1 ML: Performed by: INTERNAL MEDICINE

## 2017-01-01 PROCEDURE — 85007 BL SMEAR W/DIFF WBC COUNT: CPT | Performed by: INTERNAL MEDICINE

## 2017-01-01 PROCEDURE — 97162 PT EVAL MOD COMPLEX 30 MIN: CPT

## 2017-01-01 PROCEDURE — 73560 X-RAY EXAM OF KNEE 1 OR 2: CPT

## 2017-01-01 PROCEDURE — C1769 GUIDE WIRE: HCPCS | Performed by: INTERNAL MEDICINE

## 2017-01-01 PROCEDURE — 83036 HEMOGLOBIN GLYCOSYLATED A1C: CPT | Performed by: INTERNAL MEDICINE

## 2017-01-01 PROCEDURE — 97110 THERAPEUTIC EXERCISES: CPT

## 2017-01-01 PROCEDURE — 87798 DETECT AGENT NOS DNA AMP: CPT | Performed by: HOSPITALIST

## 2017-01-01 PROCEDURE — 99222 1ST HOSP IP/OBS MODERATE 55: CPT | Performed by: INTERNAL MEDICINE

## 2017-01-01 PROCEDURE — 85347 COAGULATION TIME ACTIVATED: CPT

## 2017-01-01 PROCEDURE — 85025 COMPLETE CBC W/AUTO DIFF WBC: CPT | Performed by: EMERGENCY MEDICINE

## 2017-01-01 PROCEDURE — 97161 PT EVAL LOW COMPLEX 20 MIN: CPT

## 2017-01-01 PROCEDURE — 83036 HEMOGLOBIN GLYCOSYLATED A1C: CPT | Performed by: HOSPITALIST

## 2017-01-01 PROCEDURE — 93005 ELECTROCARDIOGRAM TRACING: CPT | Performed by: EMERGENCY MEDICINE

## 2017-01-01 PROCEDURE — 82565 ASSAY OF CREATININE: CPT | Performed by: INTERNAL MEDICINE

## 2017-01-01 PROCEDURE — 25010000002 PROPOFOL 10 MG/ML EMULSION: Performed by: INTERNAL MEDICINE

## 2017-01-01 PROCEDURE — 93010 ELECTROCARDIOGRAM REPORT: CPT | Performed by: INTERNAL MEDICINE

## 2017-01-01 PROCEDURE — 25010000002 MANNITOL PER 50 ML: Performed by: INTERNAL MEDICINE

## 2017-01-01 PROCEDURE — 63710000001 DIPHENHYDRAMINE PER 50 MG: Performed by: HOSPITALIST

## 2017-01-01 PROCEDURE — 82803 BLOOD GASES ANY COMBINATION: CPT

## 2017-01-01 PROCEDURE — 63410000001 EPOETIN ALFA PER 1000 UNITS: Performed by: INTERNAL MEDICINE

## 2017-01-01 PROCEDURE — 86140 C-REACTIVE PROTEIN: CPT | Performed by: EMERGENCY MEDICINE

## 2017-01-01 PROCEDURE — 99202 OFFICE O/P NEW SF 15 MIN: CPT | Performed by: PSYCHIATRY & NEUROLOGY

## 2017-01-01 PROCEDURE — 99205 OFFICE O/P NEW HI 60 MIN: CPT | Performed by: INTERNAL MEDICINE

## 2017-01-01 PROCEDURE — 94003 VENT MGMT INPAT SUBQ DAY: CPT

## 2017-01-01 PROCEDURE — 93005 ELECTROCARDIOGRAM TRACING: CPT

## 2017-01-01 PROCEDURE — 84100 ASSAY OF PHOSPHORUS: CPT | Performed by: INTERNAL MEDICINE

## 2017-01-01 PROCEDURE — B2011ZZ PLAIN RADIOGRAPHY OF MULTIPLE CORONARY ARTERIES USING LOW OSMOLAR CONTRAST: ICD-10-PCS | Performed by: INTERNAL MEDICINE

## 2017-01-01 PROCEDURE — 93567 NJX CAR CTH SPRVLV AORTGRPHY: CPT | Performed by: INTERNAL MEDICINE

## 2017-01-01 PROCEDURE — 92950 HEART/LUNG RESUSCITATION CPR: CPT | Performed by: INTERNAL MEDICINE

## 2017-01-01 PROCEDURE — 82533 TOTAL CORTISOL: CPT | Performed by: INTERNAL MEDICINE

## 2017-01-01 PROCEDURE — 87070 CULTURE OTHR SPECIMN AEROBIC: CPT | Performed by: INTERNAL MEDICINE

## 2017-01-01 PROCEDURE — 80069 RENAL FUNCTION PANEL: CPT | Performed by: INTERNAL MEDICINE

## 2017-01-01 PROCEDURE — 82550 ASSAY OF CK (CPK): CPT | Performed by: INTERNAL MEDICINE

## 2017-01-01 PROCEDURE — 71020 HC CHEST PA AND LATERAL: CPT

## 2017-01-01 PROCEDURE — 25010000002 ONDANSETRON PER 1 MG: Performed by: INTERNAL MEDICINE

## 2017-01-01 PROCEDURE — 0BH17EZ INSERTION OF ENDOTRACHEAL AIRWAY INTO TRACHEA, VIA NATURAL OR ARTIFICIAL OPENING: ICD-10-PCS | Performed by: ANESTHESIOLOGY

## 2017-01-01 PROCEDURE — 86706 HEP B SURFACE ANTIBODY: CPT | Performed by: INTERNAL MEDICINE

## 2017-01-01 PROCEDURE — 93456 R HRT CORONARY ARTERY ANGIO: CPT | Performed by: INTERNAL MEDICINE

## 2017-01-01 PROCEDURE — 36415 COLL VENOUS BLD VENIPUNCTURE: CPT

## 2017-01-01 PROCEDURE — 25010000002 METHYLPREDNISOLONE PER 125 MG: Performed by: INTERNAL MEDICINE

## 2017-01-01 PROCEDURE — 93325 DOPPLER ECHO COLOR FLOW MAPG: CPT | Performed by: INTERNAL MEDICINE

## 2017-01-01 PROCEDURE — 36415 COLL VENOUS BLD VENIPUNCTURE: CPT | Performed by: INTERNAL MEDICINE

## 2017-01-01 PROCEDURE — 92610 EVALUATE SWALLOWING FUNCTION: CPT

## 2017-01-01 PROCEDURE — 93308 TTE F-UP OR LMTD: CPT | Performed by: INTERNAL MEDICINE

## 2017-01-01 PROCEDURE — 84145 PROCALCITONIN (PCT): CPT | Performed by: EMERGENCY MEDICINE

## 2017-01-01 PROCEDURE — B2061ZZ PLAIN RADIOGRAPHY OF RIGHT AND LEFT HEART USING LOW OSMOLAR CONTRAST: ICD-10-PCS | Performed by: INTERNAL MEDICINE

## 2017-01-01 PROCEDURE — 94002 VENT MGMT INPAT INIT DAY: CPT

## 2017-01-01 PROCEDURE — 25010000002 EPINEPHRINE PER 0.1 MG: Performed by: INTERNAL MEDICINE

## 2017-01-01 PROCEDURE — 84132 ASSAY OF SERUM POTASSIUM: CPT | Performed by: INTERNAL MEDICINE

## 2017-01-01 PROCEDURE — 82550 ASSAY OF CK (CPK): CPT | Performed by: PSYCHIATRY & NEUROLOGY

## 2017-01-01 PROCEDURE — 94762 N-INVAS EAR/PLS OXIMTRY CONT: CPT

## 2017-01-01 PROCEDURE — 99203 OFFICE O/P NEW LOW 30 MIN: CPT | Performed by: INTERNAL MEDICINE

## 2017-01-01 PROCEDURE — 25010000002 MIDAZOLAM PER 1 MG: Performed by: INTERNAL MEDICINE

## 2017-01-01 PROCEDURE — 87147 CULTURE TYPE IMMUNOLOGIC: CPT | Performed by: EMERGENCY MEDICINE

## 2017-01-01 PROCEDURE — 63710000001 INSULIN DETEMER PER 5 UNITS: Performed by: HOSPITALIST

## 2017-01-01 PROCEDURE — B3001ZZ PLAIN RADIOGRAPHY OF THORACIC AORTA USING LOW OSMOLAR CONTRAST: ICD-10-PCS | Performed by: INTERNAL MEDICINE

## 2017-01-01 PROCEDURE — 25010000002 ONDANSETRON PER 1 MG: Performed by: EMERGENCY MEDICINE

## 2017-01-01 PROCEDURE — 85610 PROTHROMBIN TIME: CPT

## 2017-01-01 PROCEDURE — 87040 BLOOD CULTURE FOR BACTERIA: CPT | Performed by: EMERGENCY MEDICINE

## 2017-01-01 PROCEDURE — 93283 PRGRMG EVAL IMPLANTABLE DFB: CPT | Performed by: INTERNAL MEDICINE

## 2017-01-01 PROCEDURE — 25010000002 NALOXONE PER 1 MG: Performed by: INTERNAL MEDICINE

## 2017-01-01 PROCEDURE — 25010000002 ENOXAPARIN PER 10 MG: Performed by: INTERNAL MEDICINE

## 2017-01-01 PROCEDURE — 74176 CT ABD & PELVIS W/O CONTRAST: CPT

## 2017-01-01 PROCEDURE — 99204 OFFICE O/P NEW MOD 45 MIN: CPT | Performed by: NURSE PRACTITIONER

## 2017-01-01 PROCEDURE — 93325 DOPPLER ECHO COLOR FLOW MAPG: CPT

## 2017-01-01 PROCEDURE — 83605 ASSAY OF LACTIC ACID: CPT | Performed by: EMERGENCY MEDICINE

## 2017-01-01 PROCEDURE — G8978 MOBILITY CURRENT STATUS: HCPCS

## 2017-01-01 PROCEDURE — 87205 SMEAR GRAM STAIN: CPT | Performed by: INTERNAL MEDICINE

## 2017-01-01 PROCEDURE — 25010000002 LORAZEPAM PER 2 MG: Performed by: EMERGENCY MEDICINE

## 2017-01-01 PROCEDURE — 25010000002 ALTEPLASE 2 MG RECONSTITUTED SOLUTION: Performed by: INTERNAL MEDICINE

## 2017-01-01 PROCEDURE — 84100 ASSAY OF PHOSPHORUS: CPT | Performed by: EMERGENCY MEDICINE

## 2017-01-01 PROCEDURE — 87581 M.PNEUMON DNA AMP PROBE: CPT | Performed by: HOSPITALIST

## 2017-01-01 PROCEDURE — 99213 OFFICE O/P EST LOW 20 MIN: CPT | Performed by: FAMILY MEDICINE

## 2017-01-01 PROCEDURE — 36415 COLL VENOUS BLD VENIPUNCTURE: CPT | Performed by: EMERGENCY MEDICINE

## 2017-01-01 PROCEDURE — 5A1945Z RESPIRATORY VENTILATION, 24-96 CONSECUTIVE HOURS: ICD-10-PCS | Performed by: INTERNAL MEDICINE

## 2017-01-01 PROCEDURE — 93321 DOPPLER ECHO F-UP/LMTD STD: CPT | Performed by: INTERNAL MEDICINE

## 2017-01-01 PROCEDURE — 94760 N-INVAS EAR/PLS OXIMETRY 1: CPT

## 2017-01-01 PROCEDURE — 25010000003 POTASSIUM CHLORIDE 10 MEQ/100ML SOLUTION: Performed by: INTERNAL MEDICINE

## 2017-01-01 PROCEDURE — 87150 DNA/RNA AMPLIFIED PROBE: CPT | Performed by: EMERGENCY MEDICINE

## 2017-01-01 PROCEDURE — 87633 RESP VIRUS 12-25 TARGETS: CPT | Performed by: HOSPITALIST

## 2017-01-01 PROCEDURE — 99239 HOSP IP/OBS DSCHRG MGMT >30: CPT | Performed by: INTERNAL MEDICINE

## 2017-01-01 PROCEDURE — 87186 SC STD MICRODIL/AGAR DIL: CPT | Performed by: EMERGENCY MEDICINE

## 2017-01-01 PROCEDURE — 80202 ASSAY OF VANCOMYCIN: CPT | Performed by: INTERNAL MEDICINE

## 2017-01-01 PROCEDURE — 25010000002 COSYNTROPIN PER 0.25 MG: Performed by: INTERNAL MEDICINE

## 2017-01-01 PROCEDURE — 25010000002 PHENYLEPHRINE PER 1 ML: Performed by: INTERNAL MEDICINE

## 2017-01-01 PROCEDURE — 82085 ASSAY OF ALDOLASE: CPT | Performed by: PSYCHIATRY & NEUROLOGY

## 2017-01-01 PROCEDURE — 80061 LIPID PANEL: CPT | Performed by: INTERNAL MEDICINE

## 2017-01-01 PROCEDURE — 71250 CT THORAX DX C-: CPT

## 2017-01-01 PROCEDURE — 99152 MOD SED SAME PHYS/QHP 5/>YRS: CPT | Performed by: INTERNAL MEDICINE

## 2017-01-01 PROCEDURE — 63710000001 INSULIN ASPART PER 5 UNITS: Performed by: INTERNAL MEDICINE

## 2017-01-01 PROCEDURE — 93005 ELECTROCARDIOGRAM TRACING: CPT | Performed by: INTERNAL MEDICINE

## 2017-01-01 PROCEDURE — 82024 ASSAY OF ACTH: CPT | Performed by: INTERNAL MEDICINE

## 2017-01-01 PROCEDURE — 92610 EVALUATE SWALLOWING FUNCTION: CPT | Performed by: SPEECH-LANGUAGE PATHOLOGIST

## 2017-01-01 PROCEDURE — 93970 EXTREMITY STUDY: CPT

## 2017-01-01 PROCEDURE — 25010000002 ONDANSETRON PER 1 MG

## 2017-01-01 PROCEDURE — 36600 WITHDRAWAL OF ARTERIAL BLOOD: CPT

## 2017-01-01 PROCEDURE — 25010000002 PROPOFOL 10 MG/ML EMULSION: Performed by: ANESTHESIOLOGY

## 2017-01-01 PROCEDURE — 25010000002 PIPERACILLIN SOD-TAZOBACTAM PER 1 G: Performed by: EMERGENCY MEDICINE

## 2017-01-01 PROCEDURE — 93306 TTE W/DOPPLER COMPLETE: CPT | Performed by: INTERNAL MEDICINE

## 2017-01-01 PROCEDURE — 25010000002 EPINEPHRINE 0.1 MG/ML SOLUTION PREFILLED SYRINGE: Performed by: INTERNAL MEDICINE

## 2017-01-01 PROCEDURE — 84145 PROCALCITONIN (PCT): CPT | Performed by: INTERNAL MEDICINE

## 2017-01-01 PROCEDURE — 99283 EMERGENCY DEPT VISIT LOW MDM: CPT

## 2017-01-01 PROCEDURE — 85730 THROMBOPLASTIN TIME PARTIAL: CPT | Performed by: EMERGENCY MEDICINE

## 2017-01-01 PROCEDURE — 87493 C DIFF AMPLIFIED PROBE: CPT | Performed by: INTERNAL MEDICINE

## 2017-01-01 PROCEDURE — 82330 ASSAY OF CALCIUM: CPT | Performed by: EMERGENCY MEDICINE

## 2017-01-01 PROCEDURE — 96375 TX/PRO/DX INJ NEW DRUG ADDON: CPT

## 2017-01-01 PROCEDURE — 85014 HEMATOCRIT: CPT

## 2017-01-01 PROCEDURE — 93306 TTE W/DOPPLER COMPLETE: CPT

## 2017-01-01 PROCEDURE — 96374 THER/PROPH/DIAG INJ IV PUSH: CPT

## 2017-01-01 RX ORDER — ACETAMINOPHEN 160 MG/5ML
650 SOLUTION ORAL EVERY 4 HOURS PRN
Status: DISCONTINUED | OUTPATIENT
Start: 2017-01-01 | End: 2017-01-01 | Stop reason: HOSPADM

## 2017-01-01 RX ORDER — ACETAMINOPHEN 500 MG
500 TABLET ORAL EVERY 6 HOURS PRN
COMMUNITY

## 2017-01-01 RX ORDER — WARFARIN SODIUM 5 MG/1
5 TABLET ORAL
Status: DISCONTINUED | OUTPATIENT
Start: 2017-01-01 | End: 2017-01-01 | Stop reason: HOSPADM

## 2017-01-01 RX ORDER — LORAZEPAM 2 MG/ML
2 INJECTION INTRAMUSCULAR
Status: DISCONTINUED | OUTPATIENT
Start: 2017-01-01 | End: 2017-08-25 | Stop reason: HOSPADM

## 2017-01-01 RX ORDER — LORAZEPAM 2 MG/ML
0.25 INJECTION INTRAMUSCULAR EVERY 6 HOURS PRN
Status: DISCONTINUED | OUTPATIENT
Start: 2017-01-01 | End: 2017-01-01

## 2017-01-01 RX ORDER — FENTANYL CITRATE 50 UG/ML
INJECTION, SOLUTION INTRAMUSCULAR; INTRAVENOUS AS NEEDED
Status: DISCONTINUED | OUTPATIENT
Start: 2017-01-01 | End: 2017-01-01 | Stop reason: HOSPADM

## 2017-01-01 RX ORDER — FENTANYL CITRATE 50 UG/ML
75 INJECTION, SOLUTION INTRAMUSCULAR; INTRAVENOUS
Status: DISCONTINUED | OUTPATIENT
Start: 2017-01-01 | End: 2017-08-25 | Stop reason: HOSPADM

## 2017-01-01 RX ORDER — SODIUM CHLORIDE 0.9 % (FLUSH) 0.9 %
1-10 SYRINGE (ML) INJECTION AS NEEDED
Status: DISCONTINUED | OUTPATIENT
Start: 2017-01-01 | End: 2017-01-01 | Stop reason: HOSPADM

## 2017-01-01 RX ORDER — LORAZEPAM 2 MG/ML
0.5 CONCENTRATE ORAL
Status: CANCELLED | OUTPATIENT
Start: 2017-01-01 | End: 2017-09-03

## 2017-01-01 RX ORDER — CHOLECALCIFEROL (VITAMIN D3) 125 MCG
500 CAPSULE ORAL DAILY
Status: DISCONTINUED | OUTPATIENT
Start: 2017-01-01 | End: 2017-01-01 | Stop reason: HOSPADM

## 2017-01-01 RX ORDER — ATORVASTATIN CALCIUM 20 MG/1
20 TABLET, FILM COATED ORAL DAILY
Status: DISCONTINUED | OUTPATIENT
Start: 2017-01-01 | End: 2017-01-01 | Stop reason: HOSPADM

## 2017-01-01 RX ORDER — LORAZEPAM 2 MG/ML
2 CONCENTRATE ORAL
Status: DISCONTINUED | OUTPATIENT
Start: 2017-01-01 | End: 2017-01-01 | Stop reason: HOSPADM

## 2017-01-01 RX ORDER — SUCCINYLCHOLINE CHLORIDE 20 MG/ML
INJECTION INTRAMUSCULAR; INTRAVENOUS AS NEEDED
Status: DISCONTINUED | OUTPATIENT
Start: 2017-01-01 | End: 2017-01-01 | Stop reason: HOSPADM

## 2017-01-01 RX ORDER — ONDANSETRON 4 MG/1
4 TABLET, FILM COATED ORAL EVERY 6 HOURS PRN
Status: DISCONTINUED | OUTPATIENT
Start: 2017-01-01 | End: 2017-01-01 | Stop reason: HOSPADM

## 2017-01-01 RX ORDER — LORAZEPAM 2 MG/ML
1 INJECTION INTRAMUSCULAR
Status: DISCONTINUED | OUTPATIENT
Start: 2017-01-01 | End: 2017-01-01 | Stop reason: HOSPADM

## 2017-01-01 RX ORDER — BUSPIRONE HYDROCHLORIDE 10 MG/1
TABLET ORAL
Qty: 60 TABLET | Refills: 0 | OUTPATIENT
Start: 2017-01-01

## 2017-01-01 RX ORDER — LORAZEPAM 2 MG/ML
0.25 INJECTION INTRAMUSCULAR 2 TIMES DAILY PRN
Status: DISCONTINUED | OUTPATIENT
Start: 2017-01-01 | End: 2017-01-01

## 2017-01-01 RX ORDER — POTASSIUM CHLORIDE 750 MG/1
20 CAPSULE, EXTENDED RELEASE ORAL DAILY
Status: DISCONTINUED | OUTPATIENT
Start: 2017-01-01 | End: 2017-01-01

## 2017-01-01 RX ORDER — BUDESONIDE AND FORMOTEROL FUMARATE DIHYDRATE 160; 4.5 UG/1; UG/1
2 AEROSOL RESPIRATORY (INHALATION)
COMMUNITY

## 2017-01-01 RX ORDER — WARFARIN SODIUM 1 MG/1
TABLET ORAL
Qty: 30 TABLET | Refills: 11 | Status: SHIPPED | OUTPATIENT
Start: 2017-01-01 | End: 2017-01-01 | Stop reason: HOSPADM

## 2017-01-01 RX ORDER — SODIUM CHLORIDE 0.9 % (FLUSH) 0.9 %
10 SYRINGE (ML) INJECTION AS NEEDED
Status: DISCONTINUED | OUTPATIENT
Start: 2017-01-01 | End: 2017-01-01 | Stop reason: HOSPADM

## 2017-01-01 RX ORDER — LORATADINE 10 MG/1
10 TABLET ORAL DAILY
COMMUNITY

## 2017-01-01 RX ORDER — ACETAMINOPHEN 160 MG/5ML
650 SOLUTION ORAL EVERY 4 HOURS PRN
Status: DISCONTINUED | OUTPATIENT
Start: 2017-01-01 | End: 2017-08-25 | Stop reason: HOSPADM

## 2017-01-01 RX ORDER — WARFARIN SODIUM 7.5 MG/1
7.5 TABLET ORAL SEE ADMIN INSTRUCTIONS
COMMUNITY

## 2017-01-01 RX ORDER — LORAZEPAM 2 MG/ML
1 INJECTION INTRAMUSCULAR
Status: DISCONTINUED | OUTPATIENT
Start: 2017-01-01 | End: 2017-08-25 | Stop reason: HOSPADM

## 2017-01-01 RX ORDER — POTASSIUM CHLORIDE 7.45 MG/ML
10 INJECTION INTRAVENOUS
Status: COMPLETED | OUTPATIENT
Start: 2017-01-01 | End: 2017-01-01

## 2017-01-01 RX ORDER — WARFARIN SODIUM 5 MG/1
5 TABLET ORAL
Status: DISCONTINUED | OUTPATIENT
Start: 2017-01-01 | End: 2017-01-01

## 2017-01-01 RX ORDER — SODIUM CHLORIDE 0.9 % (FLUSH) 0.9 %
10 SYRINGE (ML) INJECTION AS NEEDED
Status: CANCELLED | OUTPATIENT
Start: 2017-01-01

## 2017-01-01 RX ORDER — TORSEMIDE 20 MG/1
40 TABLET ORAL DAILY
Status: DISCONTINUED | OUTPATIENT
Start: 2017-01-01 | End: 2017-01-01

## 2017-01-01 RX ORDER — LORAZEPAM 2 MG/ML
1 CONCENTRATE ORAL
Status: DISCONTINUED | OUTPATIENT
Start: 2017-01-01 | End: 2017-08-25 | Stop reason: HOSPADM

## 2017-01-01 RX ORDER — LOSARTAN POTASSIUM 50 MG/1
50 TABLET ORAL DAILY
Status: DISCONTINUED | OUTPATIENT
Start: 2017-01-01 | End: 2017-01-01

## 2017-01-01 RX ORDER — LORAZEPAM 0.5 MG/1
0.5 TABLET ORAL
Status: DISCONTINUED | OUTPATIENT
Start: 2017-01-01 | End: 2017-08-25 | Stop reason: HOSPADM

## 2017-01-01 RX ORDER — ACETAMINOPHEN 650 MG/1
650 SUPPOSITORY RECTAL ONCE
Status: COMPLETED | OUTPATIENT
Start: 2017-01-01 | End: 2017-01-01

## 2017-01-01 RX ORDER — LORAZEPAM 2 MG/ML
0.25 INJECTION INTRAMUSCULAR EVERY 4 HOURS PRN
Status: DISCONTINUED | OUTPATIENT
Start: 2017-01-01 | End: 2017-08-25 | Stop reason: HOSPADM

## 2017-01-01 RX ORDER — FENTANYL CITRATE 50 UG/ML
75 INJECTION, SOLUTION INTRAMUSCULAR; INTRAVENOUS
Status: DISCONTINUED | OUTPATIENT
Start: 2017-01-01 | End: 2017-01-01 | Stop reason: HOSPADM

## 2017-01-01 RX ORDER — ONDANSETRON 4 MG/1
4 TABLET, ORALLY DISINTEGRATING ORAL EVERY 6 HOURS PRN
Status: DISCONTINUED | OUTPATIENT
Start: 2017-01-01 | End: 2017-08-25 | Stop reason: HOSPADM

## 2017-01-01 RX ORDER — DIPHENOXYLATE HYDROCHLORIDE AND ATROPINE SULFATE 2.5; .025 MG/1; MG/1
1 TABLET ORAL
Status: DISCONTINUED | OUTPATIENT
Start: 2017-01-01 | End: 2017-08-25 | Stop reason: HOSPADM

## 2017-01-01 RX ORDER — POTASSIUM CHLORIDE 1.5 G/1.77G
20 POWDER, FOR SOLUTION ORAL DAILY
Status: DISCONTINUED | OUTPATIENT
Start: 2017-01-01 | End: 2017-01-01

## 2017-01-01 RX ORDER — ERGOCALCIFEROL 1.25 MG/1
50000 CAPSULE ORAL WEEKLY
Status: DISCONTINUED | OUTPATIENT
Start: 2017-01-01 | End: 2017-01-01 | Stop reason: HOSPADM

## 2017-01-01 RX ORDER — LANCETS 33 GAUGE
EACH MISCELLANEOUS
Qty: 300 EACH | Refills: 2 | Status: SHIPPED | OUTPATIENT
Start: 2017-01-01 | End: 2017-01-01

## 2017-01-01 RX ORDER — ONDANSETRON 4 MG/1
4 TABLET, FILM COATED ORAL EVERY 6 HOURS PRN
Status: DISCONTINUED | OUTPATIENT
Start: 2017-01-01 | End: 2017-08-25 | Stop reason: HOSPADM

## 2017-01-01 RX ORDER — ETOMIDATE 2 MG/ML
INJECTION INTRAVENOUS
Status: DISCONTINUED
Start: 2017-01-01 | End: 2017-01-01 | Stop reason: WASHOUT

## 2017-01-01 RX ORDER — LORAZEPAM 2 MG/ML
1 INJECTION INTRAMUSCULAR
Status: CANCELLED | OUTPATIENT
Start: 2017-01-01 | End: 2017-09-03

## 2017-01-01 RX ORDER — ACETAMINOPHEN 160 MG/5ML
650 SOLUTION ORAL EVERY 4 HOURS PRN
Status: CANCELLED | OUTPATIENT
Start: 2017-01-01

## 2017-01-01 RX ORDER — GLYCOPYRROLATE 0.2 MG/ML
0.1 INJECTION INTRAMUSCULAR; INTRAVENOUS EVERY 6 HOURS PRN
Status: DISCONTINUED | OUTPATIENT
Start: 2017-01-01 | End: 2017-08-25 | Stop reason: HOSPADM

## 2017-01-01 RX ORDER — ONDANSETRON 2 MG/ML
4 INJECTION INTRAMUSCULAR; INTRAVENOUS EVERY 6 HOURS PRN
Status: DISCONTINUED | OUTPATIENT
Start: 2017-01-01 | End: 2017-08-25 | Stop reason: HOSPADM

## 2017-01-01 RX ORDER — FAMOTIDINE 10 MG/ML
20 INJECTION, SOLUTION INTRAVENOUS DAILY
Status: DISCONTINUED | OUTPATIENT
Start: 2017-01-01 | End: 2017-01-01

## 2017-01-01 RX ORDER — INSULIN GLARGINE 100 [IU]/ML
INJECTION, SOLUTION SUBCUTANEOUS DAILY
COMMUNITY
End: 2017-01-01

## 2017-01-01 RX ORDER — NICOTINE POLACRILEX 4 MG
15 LOZENGE BUCCAL
Status: DISCONTINUED | OUTPATIENT
Start: 2017-01-01 | End: 2017-01-01 | Stop reason: HOSPADM

## 2017-01-01 RX ORDER — DEXTROSE MONOHYDRATE 25 G/50ML
25 INJECTION, SOLUTION INTRAVENOUS
Status: DISCONTINUED | OUTPATIENT
Start: 2017-01-01 | End: 2017-01-01 | Stop reason: HOSPADM

## 2017-01-01 RX ORDER — WARFARIN SODIUM 2.5 MG/1
2.5 TABLET ORAL
Status: DISCONTINUED | OUTPATIENT
Start: 2017-01-01 | End: 2017-01-01

## 2017-01-01 RX ORDER — LORAZEPAM 2 MG/ML
0.5 INJECTION INTRAMUSCULAR
Status: CANCELLED | OUTPATIENT
Start: 2017-01-01 | End: 2017-09-03

## 2017-01-01 RX ORDER — SUCCINYLCHOLINE CHLORIDE 20 MG/ML
INJECTION INTRAMUSCULAR; INTRAVENOUS
Status: DISCONTINUED
Start: 2017-01-01 | End: 2017-01-01 | Stop reason: WASHOUT

## 2017-01-01 RX ORDER — GUAIFENESIN 600 MG/1
1200 TABLET, EXTENDED RELEASE ORAL 2 TIMES DAILY
COMMUNITY

## 2017-01-01 RX ORDER — ALBUTEROL SULFATE 2.5 MG/3ML
2.5 SOLUTION RESPIRATORY (INHALATION) EVERY 4 HOURS PRN
Status: DISCONTINUED | OUTPATIENT
Start: 2017-01-01 | End: 2017-01-01 | Stop reason: HOSPADM

## 2017-01-01 RX ORDER — DILTIAZEM HYDROCHLORIDE 60 MG/1
60 TABLET, FILM COATED ORAL DAILY
Status: DISCONTINUED | OUTPATIENT
Start: 2017-01-01 | End: 2017-01-01

## 2017-01-01 RX ORDER — BUPROPION HYDROCHLORIDE 150 MG/1
150 TABLET, EXTENDED RELEASE ORAL 2 TIMES DAILY
COMMUNITY
End: 2017-01-01 | Stop reason: SDUPTHER

## 2017-01-01 RX ORDER — BUSPIRONE HYDROCHLORIDE 10 MG/1
10 TABLET ORAL 2 TIMES DAILY
Status: DISCONTINUED | OUTPATIENT
Start: 2017-01-01 | End: 2017-01-01 | Stop reason: HOSPADM

## 2017-01-01 RX ORDER — METHYLPREDNISOLONE SODIUM SUCCINATE 125 MG/2ML
INJECTION, POWDER, LYOPHILIZED, FOR SOLUTION INTRAMUSCULAR; INTRAVENOUS AS NEEDED
Status: DISCONTINUED | OUTPATIENT
Start: 2017-01-01 | End: 2017-01-01 | Stop reason: HOSPADM

## 2017-01-01 RX ORDER — CHOLECALCIFEROL (VITAMIN D3) 125 MCG
5 CAPSULE ORAL NIGHTLY PRN
Status: DISCONTINUED | OUTPATIENT
Start: 2017-01-01 | End: 2017-01-01

## 2017-01-01 RX ORDER — FENTANYL CITRATE 50 UG/ML
75 INJECTION, SOLUTION INTRAMUSCULAR; INTRAVENOUS
Status: CANCELLED | OUTPATIENT
Start: 2017-01-01 | End: 2017-09-02

## 2017-01-01 RX ORDER — LORAZEPAM 2 MG/ML
2 INJECTION INTRAMUSCULAR
Status: CANCELLED | OUTPATIENT
Start: 2017-01-01 | End: 2017-09-03

## 2017-01-01 RX ORDER — MIDODRINE HYDROCHLORIDE 5 MG/1
5 TABLET ORAL
COMMUNITY
End: 2017-01-01 | Stop reason: HOSPADM

## 2017-01-01 RX ORDER — HEPARIN SODIUM 1000 [USP'U]/ML
4000 INJECTION, SOLUTION INTRAVENOUS; SUBCUTANEOUS ONCE
Status: COMPLETED | OUTPATIENT
Start: 2017-01-01 | End: 2017-01-01

## 2017-01-01 RX ORDER — ACETAMINOPHEN 325 MG/1
650 TABLET ORAL EVERY 6 HOURS PRN
Status: DISCONTINUED | OUTPATIENT
Start: 2017-01-01 | End: 2017-01-01 | Stop reason: HOSPADM

## 2017-01-01 RX ORDER — LORAZEPAM 2 MG/ML
0.5 CONCENTRATE ORAL
Status: DISCONTINUED | OUTPATIENT
Start: 2017-01-01 | End: 2017-01-01 | Stop reason: HOSPADM

## 2017-01-01 RX ORDER — LORAZEPAM 2 MG/ML
2 CONCENTRATE ORAL
Status: DISCONTINUED | OUTPATIENT
Start: 2017-01-01 | End: 2017-08-25 | Stop reason: HOSPADM

## 2017-01-01 RX ORDER — CHOLECALCIFEROL (VITAMIN D3) 125 MCG
500 CAPSULE ORAL DAILY
COMMUNITY
End: 2017-01-01 | Stop reason: HOSPADM

## 2017-01-01 RX ORDER — ONDANSETRON 4 MG/1
4 TABLET, ORALLY DISINTEGRATING ORAL EVERY 6 HOURS PRN
Status: CANCELLED | OUTPATIENT
Start: 2017-01-01

## 2017-01-01 RX ORDER — COLESEVELAM 180 1/1
1875 TABLET ORAL 2 TIMES DAILY WITH MEALS
COMMUNITY
End: 2017-01-01 | Stop reason: HOSPADM

## 2017-01-01 RX ORDER — LORAZEPAM 2 MG/ML
0.5 INJECTION INTRAMUSCULAR EVERY 4 HOURS PRN
Status: DISCONTINUED | OUTPATIENT
Start: 2017-01-01 | End: 2017-01-01

## 2017-01-01 RX ORDER — LORAZEPAM 1 MG/1
2 TABLET ORAL
Status: DISCONTINUED | OUTPATIENT
Start: 2017-01-01 | End: 2017-01-01 | Stop reason: HOSPADM

## 2017-01-01 RX ORDER — MIDODRINE HYDROCHLORIDE 5 MG/1
5 TABLET ORAL SEE ADMIN INSTRUCTIONS
COMMUNITY
End: 2017-01-01 | Stop reason: HOSPADM

## 2017-01-01 RX ORDER — ALBUMIN (HUMAN) 12.5 G/50ML
12.5 SOLUTION INTRAVENOUS AS NEEDED
Status: CANCELLED | OUTPATIENT
Start: 2017-01-01 | End: 2017-01-01

## 2017-01-01 RX ORDER — BISOPROLOL FUMARATE 10 MG/1
10 TABLET, FILM COATED ORAL 2 TIMES DAILY
COMMUNITY
End: 2017-01-01 | Stop reason: SDUPTHER

## 2017-01-01 RX ORDER — MIDODRINE HYDROCHLORIDE 10 MG/1
10 TABLET ORAL
Qty: 90 TABLET | Refills: 3 | Status: SHIPPED | OUTPATIENT
Start: 2017-01-01

## 2017-01-01 RX ORDER — ROSUVASTATIN CALCIUM 10 MG/1
10 TABLET, COATED ORAL DAILY
Status: DISCONTINUED | OUTPATIENT
Start: 2017-01-01 | End: 2017-01-01 | Stop reason: HOSPADM

## 2017-01-01 RX ORDER — NALOXONE HYDROCHLORIDE 0.4 MG/ML
INJECTION, SOLUTION INTRAMUSCULAR; INTRAVENOUS; SUBCUTANEOUS AS NEEDED
Status: DISCONTINUED | OUTPATIENT
Start: 2017-01-01 | End: 2017-01-01 | Stop reason: HOSPADM

## 2017-01-01 RX ORDER — ACETAMINOPHEN 325 MG/1
650 TABLET ORAL EVERY 4 HOURS PRN
Status: DISCONTINUED | OUTPATIENT
Start: 2017-01-01 | End: 2017-01-01 | Stop reason: HOSPADM

## 2017-01-01 RX ORDER — BUDESONIDE AND FORMOTEROL FUMARATE DIHYDRATE 160; 4.5 UG/1; UG/1
2 AEROSOL RESPIRATORY (INHALATION)
COMMUNITY
Start: 2016-01-01 | End: 2017-01-01 | Stop reason: HOSPADM

## 2017-01-01 RX ORDER — BUSPIRONE HYDROCHLORIDE 10 MG/1
TABLET ORAL
Qty: 60 TABLET | Refills: 0 | Status: SHIPPED | OUTPATIENT
Start: 2017-01-01 | End: 2017-01-01

## 2017-01-01 RX ORDER — NITROGLYCERIN 0.4 MG/1
0.4 TABLET SUBLINGUAL
Status: DISCONTINUED | OUTPATIENT
Start: 2017-01-01 | End: 2017-01-01 | Stop reason: HOSPADM

## 2017-01-01 RX ORDER — ACETAMINOPHEN 650 MG/1
650 SUPPOSITORY RECTAL EVERY 4 HOURS PRN
Status: DISCONTINUED | OUTPATIENT
Start: 2017-01-01 | End: 2017-08-25 | Stop reason: HOSPADM

## 2017-01-01 RX ORDER — LORAZEPAM 2 MG/ML
2 INJECTION INTRAMUSCULAR
Status: DISCONTINUED | OUTPATIENT
Start: 2017-01-01 | End: 2017-01-01 | Stop reason: HOSPADM

## 2017-01-01 RX ORDER — BUDESONIDE AND FORMOTEROL FUMARATE DIHYDRATE 160; 4.5 UG/1; UG/1
1 AEROSOL RESPIRATORY (INHALATION)
Status: DISCONTINUED | OUTPATIENT
Start: 2017-01-01 | End: 2017-01-01 | Stop reason: HOSPADM

## 2017-01-01 RX ORDER — HYDROXYZINE HYDROCHLORIDE 25 MG/1
25 TABLET, FILM COATED ORAL EVERY 8 HOURS PRN
COMMUNITY
End: 2017-01-01 | Stop reason: HOSPADM

## 2017-01-01 RX ORDER — ACETAMINOPHEN 650 MG/1
650 SUPPOSITORY RECTAL EVERY 4 HOURS PRN
Status: DISCONTINUED | OUTPATIENT
Start: 2017-01-01 | End: 2017-01-01 | Stop reason: HOSPADM

## 2017-01-01 RX ORDER — LORAZEPAM 0.5 MG/1
0.5 TABLET ORAL ONCE AS NEEDED
Status: COMPLETED | OUTPATIENT
Start: 2017-01-01 | End: 2017-01-01

## 2017-01-01 RX ORDER — ALBUMIN (HUMAN) 12.5 G/50ML
12.5 SOLUTION INTRAVENOUS AS NEEDED
Status: DISCONTINUED | OUTPATIENT
Start: 2017-01-01 | End: 2017-01-01

## 2017-01-01 RX ORDER — PHENYLEPHRINE HCL IN 0.9% NACL 0.5 MG/5ML
SYRINGE (ML) INTRAVENOUS AS NEEDED
Status: DISCONTINUED | OUTPATIENT
Start: 2017-01-01 | End: 2017-01-01 | Stop reason: HOSPADM

## 2017-01-01 RX ORDER — GABAPENTIN 100 MG/1
CAPSULE ORAL
Status: ON HOLD | COMMUNITY
Start: 2016-01-01 | End: 2017-01-01

## 2017-01-01 RX ORDER — BUDESONIDE AND FORMOTEROL FUMARATE DIHYDRATE 160; 4.5 UG/1; UG/1
2 AEROSOL RESPIRATORY (INHALATION)
Status: DISCONTINUED | OUTPATIENT
Start: 2017-01-01 | End: 2017-01-01 | Stop reason: HOSPADM

## 2017-01-01 RX ORDER — DIPHENHYDRAMINE HCL 25 MG
25 CAPSULE ORAL NIGHTLY PRN
Status: DISCONTINUED | OUTPATIENT
Start: 2017-01-01 | End: 2017-01-01 | Stop reason: HOSPADM

## 2017-01-01 RX ORDER — MIDAZOLAM HYDROCHLORIDE 1 MG/ML
INJECTION INTRAMUSCULAR; INTRAVENOUS AS NEEDED
Status: DISCONTINUED | OUTPATIENT
Start: 2017-01-01 | End: 2017-01-01 | Stop reason: HOSPADM

## 2017-01-01 RX ORDER — LORAZEPAM 1 MG/1
2 TABLET ORAL
Status: CANCELLED | OUTPATIENT
Start: 2017-01-01 | End: 2017-09-03

## 2017-01-01 RX ORDER — DILTIAZEM HYDROCHLORIDE 60 MG/1
60 TABLET, FILM COATED ORAL 4 TIMES DAILY
Status: DISCONTINUED | OUTPATIENT
Start: 2017-01-01 | End: 2017-01-01 | Stop reason: HOSPADM

## 2017-01-01 RX ORDER — GABAPENTIN 100 MG/1
100 CAPSULE ORAL NIGHTLY
Status: DISCONTINUED | OUTPATIENT
Start: 2017-01-01 | End: 2017-01-01 | Stop reason: HOSPADM

## 2017-01-01 RX ORDER — SEVELAMER CARBONATE 800 MG/1
800 TABLET, FILM COATED ORAL
Qty: 90 TABLET | Refills: 3 | Status: SHIPPED | OUTPATIENT
Start: 2017-01-01

## 2017-01-01 RX ORDER — BISOPROLOL FUMARATE 5 MG/1
10 TABLET, FILM COATED ORAL 2 TIMES DAILY
Status: DISCONTINUED | OUTPATIENT
Start: 2017-01-01 | End: 2017-01-01 | Stop reason: HOSPADM

## 2017-01-01 RX ORDER — AZITHROMYCIN 500 MG/1
TABLET, FILM COATED ORAL
Refills: 0 | Status: ON HOLD | COMMUNITY
Start: 2017-01-01 | End: 2017-01-01

## 2017-01-01 RX ORDER — MANNITOL 250 MG/ML
25 INJECTION, SOLUTION INTRAVENOUS AS NEEDED
Status: DISCONTINUED | OUTPATIENT
Start: 2017-01-01 | End: 2017-01-01 | Stop reason: HOSPADM

## 2017-01-01 RX ORDER — LORAZEPAM 2 MG/ML
1 CONCENTRATE ORAL
Status: DISCONTINUED | OUTPATIENT
Start: 2017-01-01 | End: 2017-01-01 | Stop reason: HOSPADM

## 2017-01-01 RX ORDER — FENTANYL CITRATE 50 UG/ML
INJECTION, SOLUTION INTRAMUSCULAR; INTRAVENOUS
Status: DISPENSED
Start: 2017-01-01 | End: 2017-01-01

## 2017-01-01 RX ORDER — ALBUMIN (HUMAN) 12.5 G/50ML
12.5 SOLUTION INTRAVENOUS AS NEEDED
Status: ACTIVE | OUTPATIENT
Start: 2017-01-01 | End: 2017-01-01

## 2017-01-01 RX ORDER — SEVELAMER CARBONATE FOR ORAL SUSPENSION 2400 MG/1
POWDER, FOR SUSPENSION ORAL
Status: ON HOLD | COMMUNITY
End: 2017-01-01 | Stop reason: ALTCHOICE

## 2017-01-01 RX ORDER — ACETAMINOPHEN 650 MG/1
SUPPOSITORY RECTAL
Status: COMPLETED
Start: 2017-01-01 | End: 2017-01-01

## 2017-01-01 RX ORDER — MANNITOL 250 MG/ML
25 INJECTION, SOLUTION INTRAVENOUS AS NEEDED
Status: DISCONTINUED | OUTPATIENT
Start: 2017-01-01 | End: 2017-01-01

## 2017-01-01 RX ORDER — COSYNTROPIN 0.25 MG/ML
0.25 INJECTION, POWDER, FOR SOLUTION INTRAMUSCULAR; INTRAVENOUS ONCE
Status: COMPLETED | OUTPATIENT
Start: 2017-01-01 | End: 2017-01-01

## 2017-01-01 RX ORDER — TRAMADOL HYDROCHLORIDE 50 MG/1
50 TABLET ORAL EVERY 6 HOURS PRN
COMMUNITY
End: 2017-01-01 | Stop reason: HOSPADM

## 2017-01-01 RX ORDER — MIDODRINE HYDROCHLORIDE 5 MG/1
5 TABLET ORAL 3 TIMES DAILY
Qty: 30 TABLET | Refills: 0 | Status: SHIPPED | OUTPATIENT
Start: 2017-01-01 | End: 2017-01-01

## 2017-01-01 RX ORDER — LORAZEPAM 2 MG/ML
2 CONCENTRATE ORAL
Status: CANCELLED | OUTPATIENT
Start: 2017-01-01 | End: 2017-09-03

## 2017-01-01 RX ORDER — DIPHENOXYLATE HYDROCHLORIDE AND ATROPINE SULFATE 2.5; .025 MG/1; MG/1
1 TABLET ORAL
Status: CANCELLED | OUTPATIENT
Start: 2017-01-01

## 2017-01-01 RX ORDER — ONDANSETRON 2 MG/ML
4 INJECTION INTRAMUSCULAR; INTRAVENOUS ONCE
Status: COMPLETED | OUTPATIENT
Start: 2017-01-01 | End: 2017-01-01

## 2017-01-01 RX ORDER — ALBUTEROL SULFATE 90 UG/1
6 AEROSOL, METERED RESPIRATORY (INHALATION)
Status: DISCONTINUED | OUTPATIENT
Start: 2017-01-01 | End: 2017-01-01

## 2017-01-01 RX ORDER — LORAZEPAM 2 MG/ML
0.25 INJECTION INTRAMUSCULAR EVERY 4 HOURS PRN
Status: DISCONTINUED | OUTPATIENT
Start: 2017-01-01 | End: 2017-01-01 | Stop reason: HOSPADM

## 2017-01-01 RX ORDER — SODIUM CHLORIDE 9 MG/ML
50 INJECTION, SOLUTION INTRAVENOUS CONTINUOUS
Status: DISCONTINUED | OUTPATIENT
Start: 2017-01-01 | End: 2017-01-01

## 2017-01-01 RX ORDER — MIDODRINE HYDROCHLORIDE 5 MG/1
10 TABLET ORAL
Status: DISCONTINUED | OUTPATIENT
Start: 2017-01-01 | End: 2017-01-01 | Stop reason: HOSPADM

## 2017-01-01 RX ORDER — WARFARIN SODIUM 7.5 MG/1
7.5 TABLET ORAL
Status: COMPLETED | OUTPATIENT
Start: 2017-01-01 | End: 2017-01-01

## 2017-01-01 RX ORDER — POTASSIUM CHLORIDE 750 MG/1
20 CAPSULE, EXTENDED RELEASE ORAL ONCE
Status: COMPLETED | OUTPATIENT
Start: 2017-01-01 | End: 2017-01-01

## 2017-01-01 RX ORDER — ONDANSETRON 4 MG/1
4 TABLET, FILM COATED ORAL EVERY 6 HOURS PRN
Status: CANCELLED | OUTPATIENT
Start: 2017-01-01

## 2017-01-01 RX ORDER — LORAZEPAM 2 MG/ML
1 CONCENTRATE ORAL
Status: CANCELLED | OUTPATIENT
Start: 2017-01-01 | End: 2017-09-03

## 2017-01-01 RX ORDER — LORAZEPAM 0.5 MG/1
0.5 TABLET ORAL
Status: DISCONTINUED | OUTPATIENT
Start: 2017-01-01 | End: 2017-01-01 | Stop reason: HOSPADM

## 2017-01-01 RX ORDER — LORAZEPAM 2 MG/ML
0.5 CONCENTRATE ORAL
Status: DISCONTINUED | OUTPATIENT
Start: 2017-01-01 | End: 2017-08-25 | Stop reason: HOSPADM

## 2017-01-01 RX ORDER — HYDROXYZINE PAMOATE 25 MG/1
25 CAPSULE ORAL 3 TIMES DAILY PRN
Status: DISCONTINUED | OUTPATIENT
Start: 2017-01-01 | End: 2017-01-01 | Stop reason: HOSPADM

## 2017-01-01 RX ORDER — ONDANSETRON 2 MG/ML
4 INJECTION INTRAMUSCULAR; INTRAVENOUS EVERY 6 HOURS PRN
Status: CANCELLED | OUTPATIENT
Start: 2017-01-01

## 2017-01-01 RX ORDER — GLYCOPYRROLATE 0.2 MG/ML
0.1 INJECTION INTRAMUSCULAR; INTRAVENOUS EVERY 6 HOURS PRN
Status: DISCONTINUED | OUTPATIENT
Start: 2017-01-01 | End: 2017-01-01 | Stop reason: HOSPADM

## 2017-01-01 RX ORDER — ONDANSETRON 2 MG/ML
4 INJECTION INTRAMUSCULAR; INTRAVENOUS EVERY 6 HOURS PRN
Status: DISCONTINUED | OUTPATIENT
Start: 2017-01-01 | End: 2017-01-01 | Stop reason: HOSPADM

## 2017-01-01 RX ORDER — TORSEMIDE 20 MG/1
40 TABLET ORAL DAILY
COMMUNITY
End: 2017-01-01 | Stop reason: HOSPADM

## 2017-01-01 RX ORDER — LOPERAMIDE HYDROCHLORIDE 2 MG/1
2 CAPSULE ORAL 4 TIMES DAILY PRN
Status: DISCONTINUED | OUTPATIENT
Start: 2017-01-01 | End: 2017-01-01 | Stop reason: HOSPADM

## 2017-01-01 RX ORDER — LORAZEPAM 1 MG/1
1 TABLET ORAL
Status: CANCELLED | OUTPATIENT
Start: 2017-01-01 | End: 2017-09-03

## 2017-01-01 RX ORDER — ERGOCALCIFEROL 1.25 MG/1
50000 CAPSULE ORAL WEEKLY
COMMUNITY

## 2017-01-01 RX ORDER — LORAZEPAM 2 MG/ML
0.5 INJECTION INTRAMUSCULAR
Status: DISCONTINUED | OUTPATIENT
Start: 2017-01-01 | End: 2017-01-01 | Stop reason: HOSPADM

## 2017-01-01 RX ORDER — MIDODRINE HYDROCHLORIDE 5 MG/1
5 TABLET ORAL EVERY 8 HOURS SCHEDULED
Status: DISCONTINUED | OUTPATIENT
Start: 2017-01-01 | End: 2017-01-01 | Stop reason: HOSPADM

## 2017-01-01 RX ORDER — LANCETS 33 GAUGE
EACH MISCELLANEOUS
Qty: 300 EACH | Refills: 2 | Status: SHIPPED | OUTPATIENT
Start: 2017-01-01 | End: 2017-01-01 | Stop reason: SDUPTHER

## 2017-01-01 RX ORDER — LORAZEPAM 1 MG/1
1 TABLET ORAL
Status: DISCONTINUED | OUTPATIENT
Start: 2017-01-01 | End: 2017-01-01 | Stop reason: HOSPADM

## 2017-01-01 RX ORDER — LORAZEPAM 2 MG/ML
0.5 INJECTION INTRAMUSCULAR
Status: DISCONTINUED | OUTPATIENT
Start: 2017-01-01 | End: 2017-08-25 | Stop reason: HOSPADM

## 2017-01-01 RX ORDER — PHENOL 1.4 %
2 AEROSOL, SPRAY (ML) MUCOUS MEMBRANE NIGHTLY PRN
COMMUNITY

## 2017-01-01 RX ORDER — FLUMAZENIL 0.1 MG/ML
INJECTION INTRAVENOUS AS NEEDED
Status: DISCONTINUED | OUTPATIENT
Start: 2017-01-01 | End: 2017-01-01 | Stop reason: HOSPADM

## 2017-01-01 RX ORDER — ONDANSETRON 2 MG/ML
INJECTION INTRAMUSCULAR; INTRAVENOUS
Status: COMPLETED
Start: 2017-01-01 | End: 2017-01-01

## 2017-01-01 RX ORDER — BUSPIRONE HYDROCHLORIDE 10 MG/1
10 TABLET ORAL 3 TIMES DAILY
Status: DISCONTINUED | OUTPATIENT
Start: 2017-01-01 | End: 2017-01-01 | Stop reason: HOSPADM

## 2017-01-01 RX ORDER — WARFARIN SODIUM 1 MG/1
1 TABLET ORAL
Status: DISCONTINUED | OUTPATIENT
Start: 2017-01-01 | End: 2017-01-01 | Stop reason: HOSPADM

## 2017-01-01 RX ORDER — LORAZEPAM 1 MG/1
1 TABLET ORAL
Status: DISCONTINUED | OUTPATIENT
Start: 2017-01-01 | End: 2017-08-25 | Stop reason: HOSPADM

## 2017-01-01 RX ORDER — SODIUM CHLORIDE 0.9 % (FLUSH) 0.9 %
10 SYRINGE (ML) INJECTION AS NEEDED
Status: DISCONTINUED | OUTPATIENT
Start: 2017-01-01 | End: 2017-08-25 | Stop reason: HOSPADM

## 2017-01-01 RX ORDER — DIPHENOXYLATE HYDROCHLORIDE AND ATROPINE SULFATE 2.5; .025 MG/1; MG/1
1 TABLET ORAL
Status: DISCONTINUED | OUTPATIENT
Start: 2017-01-01 | End: 2017-01-01 | Stop reason: HOSPADM

## 2017-01-01 RX ORDER — WARFARIN SODIUM 4 MG/1
4 TABLET ORAL
COMMUNITY

## 2017-01-01 RX ORDER — ACETAMINOPHEN 650 MG/1
650 SUPPOSITORY RECTAL EVERY 4 HOURS PRN
Status: CANCELLED | OUTPATIENT
Start: 2017-01-01

## 2017-01-01 RX ORDER — HEPARIN SODIUM 5000 [USP'U]/ML
40-80 INJECTION, SOLUTION INTRAVENOUS; SUBCUTANEOUS EVERY 6 HOURS PRN
Status: DISCONTINUED | OUTPATIENT
Start: 2017-01-01 | End: 2017-01-01

## 2017-01-01 RX ORDER — LORAZEPAM 0.5 MG/1
0.5 TABLET ORAL EVERY 8 HOURS PRN
COMMUNITY

## 2017-01-01 RX ORDER — ACETAMINOPHEN 325 MG/1
650 TABLET ORAL EVERY 4 HOURS PRN
Status: CANCELLED | OUTPATIENT
Start: 2017-01-01

## 2017-01-01 RX ORDER — SODIUM CHLORIDE 0.9 % (FLUSH) 0.9 %
1-10 SYRINGE (ML) INJECTION AS NEEDED
Status: CANCELLED | OUTPATIENT
Start: 2017-01-01

## 2017-01-01 RX ORDER — LORAZEPAM 0.5 MG/1
0.5 TABLET ORAL
Status: CANCELLED | OUTPATIENT
Start: 2017-01-01 | End: 2017-09-03

## 2017-01-01 RX ORDER — PROPOFOL 10 MG/ML
VIAL (ML) INTRAVENOUS AS NEEDED
Status: DISCONTINUED | OUTPATIENT
Start: 2017-01-01 | End: 2017-01-01 | Stop reason: HOSPADM

## 2017-01-01 RX ORDER — ALBUTEROL SULFATE 2.5 MG/3ML
2.5 SOLUTION RESPIRATORY (INHALATION) EVERY 4 HOURS PRN
COMMUNITY

## 2017-01-01 RX ORDER — SEVELAMER CARBONATE 800 MG/1
800 TABLET, FILM COATED ORAL
Status: DISCONTINUED | OUTPATIENT
Start: 2017-01-01 | End: 2017-01-01 | Stop reason: HOSPADM

## 2017-01-01 RX ORDER — ACETAMINOPHEN 325 MG/1
650 TABLET ORAL EVERY 4 HOURS PRN
Status: DISCONTINUED | OUTPATIENT
Start: 2017-01-01 | End: 2017-08-25 | Stop reason: HOSPADM

## 2017-01-01 RX ORDER — HYDROXYZINE PAMOATE 25 MG/1
25 CAPSULE ORAL 3 TIMES DAILY PRN
Qty: 25 CAPSULE | Refills: 0 | Status: SHIPPED | OUTPATIENT
Start: 2017-01-01 | End: 2017-01-01 | Stop reason: SDUPTHER

## 2017-01-01 RX ORDER — ERGOCALCIFEROL 1.25 MG/1
50000 CAPSULE ORAL WEEKLY
Qty: 12 CAPSULE | Refills: 1 | Status: SHIPPED | OUTPATIENT
Start: 2017-01-01 | End: 2017-01-01

## 2017-01-01 RX ORDER — LIDOCAINE HYDROCHLORIDE 20 MG/ML
INJECTION, SOLUTION INFILTRATION; PERINEURAL AS NEEDED
Status: DISCONTINUED | OUTPATIENT
Start: 2017-01-01 | End: 2017-01-01 | Stop reason: HOSPADM

## 2017-01-01 RX ORDER — ONDANSETRON 4 MG/1
4 TABLET, ORALLY DISINTEGRATING ORAL EVERY 6 HOURS PRN
Status: DISCONTINUED | OUTPATIENT
Start: 2017-01-01 | End: 2017-01-01 | Stop reason: HOSPADM

## 2017-01-01 RX ORDER — HEPARIN SODIUM 5000 [USP'U]/ML
5000 INJECTION, SOLUTION INTRAVENOUS; SUBCUTANEOUS EVERY 8 HOURS SCHEDULED
Status: DISCONTINUED | OUTPATIENT
Start: 2017-01-01 | End: 2017-01-01

## 2017-01-01 RX ORDER — LORAZEPAM 2 MG/ML
0.25 INJECTION INTRAMUSCULAR EVERY 4 HOURS PRN
Status: CANCELLED | OUTPATIENT
Start: 2017-01-01 | End: 2017-09-02

## 2017-01-01 RX ORDER — FENTANYL CITRATE 50 UG/ML
25 INJECTION, SOLUTION INTRAMUSCULAR; INTRAVENOUS
Status: DISCONTINUED | OUTPATIENT
Start: 2017-01-01 | End: 2017-01-01 | Stop reason: HOSPADM

## 2017-01-01 RX ORDER — ATORVASTATIN CALCIUM 20 MG/1
20 TABLET, FILM COATED ORAL DAILY
COMMUNITY

## 2017-01-01 RX ORDER — GLYCOPYRROLATE 0.2 MG/ML
0.1 INJECTION INTRAMUSCULAR; INTRAVENOUS EVERY 6 HOURS PRN
Status: CANCELLED | OUTPATIENT
Start: 2017-01-01

## 2017-01-01 RX ORDER — MECLIZINE HCL 12.5 MG/1
12.5 TABLET ORAL 2 TIMES DAILY PRN
Status: DISCONTINUED | OUTPATIENT
Start: 2017-01-01 | End: 2017-01-01 | Stop reason: HOSPADM

## 2017-01-01 RX ORDER — BENZONATATE 200 MG/1
200 CAPSULE ORAL 3 TIMES DAILY PRN
COMMUNITY

## 2017-01-01 RX ORDER — PROPOFOL 10 MG/ML
VIAL (ML) INTRAVENOUS
Status: DISCONTINUED
Start: 2017-01-01 | End: 2017-01-01 | Stop reason: WASHOUT

## 2017-01-01 RX ORDER — GABAPENTIN 100 MG/1
200 CAPSULE ORAL DAILY
COMMUNITY

## 2017-01-01 RX ORDER — LOPERAMIDE HYDROCHLORIDE 2 MG/1
2 CAPSULE ORAL 4 TIMES DAILY PRN
COMMUNITY

## 2017-01-01 RX ORDER — LORAZEPAM 1 MG/1
2 TABLET ORAL
Status: DISCONTINUED | OUTPATIENT
Start: 2017-01-01 | End: 2017-08-25 | Stop reason: HOSPADM

## 2017-01-01 RX ORDER — LOSARTAN POTASSIUM 50 MG/1
50 TABLET ORAL DAILY
COMMUNITY
End: 2017-01-01 | Stop reason: HOSPADM

## 2017-01-01 RX ORDER — BUPROPION HYDROCHLORIDE 150 MG/1
150 TABLET, EXTENDED RELEASE ORAL 2 TIMES DAILY
Status: DISCONTINUED | OUTPATIENT
Start: 2017-01-01 | End: 2017-01-01 | Stop reason: HOSPADM

## 2017-01-01 RX ORDER — SODIUM CHLORIDE 0.9 % (FLUSH) 0.9 %
1-10 SYRINGE (ML) INJECTION AS NEEDED
Status: DISCONTINUED | OUTPATIENT
Start: 2017-01-01 | End: 2017-08-25 | Stop reason: HOSPADM

## 2017-01-01 RX ORDER — BUSPIRONE HYDROCHLORIDE 10 MG/1
10 TABLET ORAL 2 TIMES DAILY
COMMUNITY

## 2017-01-01 RX ORDER — LORAZEPAM 2 MG/ML
0.5 INJECTION INTRAMUSCULAR ONCE
Status: COMPLETED | OUTPATIENT
Start: 2017-01-01 | End: 2017-01-01

## 2017-01-01 RX ORDER — INSULIN DEGLUDEC INJECTION 100 U/ML
10 INJECTION, SOLUTION SUBCUTANEOUS DAILY
Qty: 3 PEN | Refills: 3 | Status: SHIPPED | OUTPATIENT
Start: 2017-01-01 | End: 2017-01-01

## 2017-01-01 RX ADMIN — SEVELAMER CARBONATE 800 MG: 800 TABLET, FILM COATED ORAL at 19:27

## 2017-01-01 RX ADMIN — ROSUVASTATIN CALCIUM 10 MG: 10 TABLET, FILM COATED ORAL at 13:23

## 2017-01-01 RX ADMIN — FENTANYL CITRATE 75 MCG: 50 INJECTION INTRAMUSCULAR; INTRAVENOUS at 19:21

## 2017-01-01 RX ADMIN — PROPOFOL 100 MG: 10 INJECTION, EMULSION INTRAVENOUS at 11:55

## 2017-01-01 RX ADMIN — FENTANYL CITRATE 25 MCG: 50 INJECTION INTRAMUSCULAR; INTRAVENOUS at 15:37

## 2017-01-01 RX ADMIN — FENTANYL CITRATE 25 MCG: 50 INJECTION INTRAMUSCULAR; INTRAVENOUS at 13:33

## 2017-01-01 RX ADMIN — IPRATROPIUM BROMIDE 6 PUFF: 17 AEROSOL, METERED RESPIRATORY (INHALATION) at 14:31

## 2017-01-01 RX ADMIN — ALBUTEROL SULFATE 6 PUFF: 90 AEROSOL, METERED RESPIRATORY (INHALATION) at 19:43

## 2017-01-01 RX ADMIN — WARFARIN SODIUM 5 MG: 5 TABLET ORAL at 17:51

## 2017-01-01 RX ADMIN — PROPOFOL 50 MCG/KG/MIN: 10 INJECTION, EMULSION INTRAVENOUS at 15:46

## 2017-01-01 RX ADMIN — TAZOBACTAM SODIUM AND PIPERACILLIN SODIUM 3.38 G: 375; 3 INJECTION, SOLUTION INTRAVENOUS at 03:51

## 2017-01-01 RX ADMIN — MIDODRINE HYDROCHLORIDE 10 MG: 5 TABLET ORAL at 07:44

## 2017-01-01 RX ADMIN — SODIUM CHLORIDE 50 ML/HR: 9 INJECTION, SOLUTION INTRAVENOUS at 10:34

## 2017-01-01 RX ADMIN — INSULIN ASPART 2 UNITS: 100 INJECTION, SOLUTION INTRAVENOUS; SUBCUTANEOUS at 08:38

## 2017-01-01 RX ADMIN — FAMOTIDINE 20 MG: 10 INJECTION INTRAVENOUS at 08:54

## 2017-01-01 RX ADMIN — SEVELAMER CARBONATE 800 MG: 800 TABLET, FILM COATED ORAL at 07:59

## 2017-01-01 RX ADMIN — PROPOFOL 40 MCG/KG/MIN: 10 INJECTION, EMULSION INTRAVENOUS at 06:43

## 2017-01-01 RX ADMIN — INSULIN ASPART 2 UNITS: 100 INJECTION, SOLUTION INTRAVENOUS; SUBCUTANEOUS at 17:46

## 2017-01-01 RX ADMIN — MIDODRINE HYDROCHLORIDE 5 MG: 5 TABLET ORAL at 21:38

## 2017-01-01 RX ADMIN — MIDODRINE HYDROCHLORIDE 10 MG: 5 TABLET ORAL at 12:33

## 2017-01-01 RX ADMIN — FENTANYL CITRATE 25 MCG: 50 INJECTION INTRAMUSCULAR; INTRAVENOUS at 05:44

## 2017-01-01 RX ADMIN — BUPROPION HYDROCHLORIDE 150 MG: 150 TABLET, EXTENDED RELEASE ORAL at 09:17

## 2017-01-01 RX ADMIN — MECLIZINE HCL 12.5 MG: 12.5 TABLET ORAL at 20:47

## 2017-01-01 RX ADMIN — METOPROLOL TARTRATE 25 MG: 25 TABLET ORAL at 12:03

## 2017-01-01 RX ADMIN — Medication 500 MCG: at 08:38

## 2017-01-01 RX ADMIN — SEVELAMER CARBONATE 800 MG: 800 TABLET, FILM COATED ORAL at 12:33

## 2017-01-01 RX ADMIN — BUDESONIDE AND FORMOTEROL FUMARATE DIHYDRATE 2 PUFF: 160; 4.5 AEROSOL RESPIRATORY (INHALATION) at 08:48

## 2017-01-01 RX ADMIN — NOREPINEPHRINE BITARTRATE 0.25 MCG/KG/MIN: 8 SOLUTION at 06:38

## 2017-01-01 RX ADMIN — MIDODRINE HYDROCHLORIDE 10 MG: 5 TABLET ORAL at 16:52

## 2017-01-01 RX ADMIN — DILTIAZEM HYDROCHLORIDE 60 MG: 60 TABLET, FILM COATED ORAL at 21:28

## 2017-01-01 RX ADMIN — LORAZEPAM 0.5 MG: 2 INJECTION INTRAMUSCULAR; INTRAVENOUS at 07:03

## 2017-01-01 RX ADMIN — BISOPROLOL FUMARATE 10 MG: 5 TABLET ORAL at 17:32

## 2017-01-01 RX ADMIN — FENTANYL CITRATE 25 MCG: 50 INJECTION INTRAMUSCULAR; INTRAVENOUS at 16:21

## 2017-01-01 RX ADMIN — ONDANSETRON 4 MG: 2 INJECTION INTRAMUSCULAR; INTRAVENOUS at 14:26

## 2017-01-01 RX ADMIN — TAZOBACTAM SODIUM AND PIPERACILLIN SODIUM 3.38 G: 375; 3 INJECTION, SOLUTION INTRAVENOUS at 15:29

## 2017-01-01 RX ADMIN — BUDESONIDE AND FORMOTEROL FUMARATE DIHYDRATE 2 PUFF: 160; 4.5 AEROSOL RESPIRATORY (INHALATION) at 08:58

## 2017-01-01 RX ADMIN — BUSPIRONE HYDROCHLORIDE 10 MG: 10 TABLET ORAL at 23:20

## 2017-01-01 RX ADMIN — HYDROXYZINE PAMOATE 25 MG: 25 CAPSULE ORAL at 11:30

## 2017-01-01 RX ADMIN — LOPERAMIDE HYDROCHLORIDE 2 MG: 2 CAPSULE ORAL at 14:07

## 2017-01-01 RX ADMIN — BUPROPION HYDROCHLORIDE 150 MG: 150 TABLET, EXTENDED RELEASE ORAL at 08:14

## 2017-01-01 RX ADMIN — MECLIZINE HCL 12.5 MG: 12.5 TABLET ORAL at 19:29

## 2017-01-01 RX ADMIN — LORAZEPAM 0.5 MG: 0.5 TABLET ORAL at 22:15

## 2017-01-01 RX ADMIN — TORSEMIDE 40 MG: 20 TABLET ORAL at 08:38

## 2017-01-01 RX ADMIN — BUSPIRONE HYDROCHLORIDE 10 MG: 10 TABLET ORAL at 17:46

## 2017-01-01 RX ADMIN — ENOXAPARIN SODIUM 30 MG: 30 INJECTION SUBCUTANEOUS at 08:04

## 2017-01-01 RX ADMIN — Medication 500 MCG: at 08:25

## 2017-01-01 RX ADMIN — SUCCINYLCHOLINE CHLORIDE 100 MG: 20 INJECTION, SOLUTION INTRAMUSCULAR; INTRAVENOUS at 11:55

## 2017-01-01 RX ADMIN — BISOPROLOL FUMARATE 10 MG: 5 TABLET ORAL at 08:25

## 2017-01-01 RX ADMIN — FENTANYL CITRATE 25 MCG: 50 INJECTION INTRAMUSCULAR; INTRAVENOUS at 07:50

## 2017-01-01 RX ADMIN — NOREPINEPHRINE BITARTRATE 0.04 MCG/KG/MIN: 8 SOLUTION at 11:09

## 2017-01-01 RX ADMIN — NOREPINEPHRINE BITARTRATE 0.25 MCG/KG/MIN: 8 SOLUTION at 01:57

## 2017-01-01 RX ADMIN — FENTANYL CITRATE 25 MCG: 50 INJECTION INTRAMUSCULAR; INTRAVENOUS at 18:33

## 2017-01-01 RX ADMIN — MIDODRINE HYDROCHLORIDE 10 MG: 5 TABLET ORAL at 12:28

## 2017-01-01 RX ADMIN — HYDROXYZINE PAMOATE 25 MG: 25 CAPSULE ORAL at 20:45

## 2017-01-01 RX ADMIN — IPRATROPIUM BROMIDE 6 PUFF: 17 AEROSOL, METERED RESPIRATORY (INHALATION) at 11:53

## 2017-01-01 RX ADMIN — BUSPIRONE HYDROCHLORIDE 10 MG: 10 TABLET ORAL at 16:49

## 2017-01-01 RX ADMIN — FENTANYL CITRATE 25 MCG: 50 INJECTION INTRAMUSCULAR; INTRAVENOUS at 20:45

## 2017-01-01 RX ADMIN — ACETAMINOPHEN 650 MG: 650 SUPPOSITORY RECTAL at 11:43

## 2017-01-01 RX ADMIN — VANCOMYCIN HYDROCHLORIDE 750 MG: 750 INJECTION, POWDER, LYOPHILIZED, FOR SOLUTION INTRAVENOUS at 18:25

## 2017-01-01 RX ADMIN — BISOPROLOL FUMARATE 10 MG: 5 TABLET ORAL at 21:28

## 2017-01-01 RX ADMIN — IPRATROPIUM BROMIDE 6 PUFF: 17 AEROSOL, METERED RESPIRATORY (INHALATION) at 15:36

## 2017-01-01 RX ADMIN — FENTANYL CITRATE 75 MCG: 50 INJECTION INTRAMUSCULAR; INTRAVENOUS at 00:46

## 2017-01-01 RX ADMIN — Medication 500 MCG: at 09:00

## 2017-01-01 RX ADMIN — ROSUVASTATIN CALCIUM 10 MG: 10 TABLET, FILM COATED ORAL at 09:00

## 2017-01-01 RX ADMIN — FAMOTIDINE 20 MG: 10 INJECTION INTRAVENOUS at 09:03

## 2017-01-01 RX ADMIN — FENTANYL CITRATE 25 MCG: 50 INJECTION INTRAMUSCULAR; INTRAVENOUS at 09:49

## 2017-01-01 RX ADMIN — DILTIAZEM HYDROCHLORIDE 60 MG: 60 TABLET, FILM COATED ORAL at 08:14

## 2017-01-01 RX ADMIN — FENTANYL CITRATE 25 MCG: 50 INJECTION INTRAMUSCULAR; INTRAVENOUS at 02:07

## 2017-01-01 RX ADMIN — MIDODRINE HYDROCHLORIDE 10 MG: 5 TABLET ORAL at 21:06

## 2017-01-01 RX ADMIN — DIPHENHYDRAMINE HYDROCHLORIDE 25 MG: 25 CAPSULE ORAL at 21:42

## 2017-01-01 RX ADMIN — WARFARIN SODIUM 7.5 MG: 7.5 TABLET ORAL at 21:29

## 2017-01-01 RX ADMIN — WARFARIN SODIUM 5 MG: 5 TABLET ORAL at 17:15

## 2017-01-01 RX ADMIN — ENOXAPARIN SODIUM 30 MG: 30 INJECTION SUBCUTANEOUS at 18:24

## 2017-01-01 RX ADMIN — ALBUTEROL SULFATE 6 PUFF: 90 AEROSOL, METERED RESPIRATORY (INHALATION) at 15:36

## 2017-01-01 RX ADMIN — Medication 500 MCG: at 08:15

## 2017-01-01 RX ADMIN — LORAZEPAM 0.25 MG: 2 INJECTION INTRAMUSCULAR; INTRAVENOUS at 08:19

## 2017-01-01 RX ADMIN — MIDODRINE HYDROCHLORIDE 10 MG: 5 TABLET ORAL at 19:27

## 2017-01-01 RX ADMIN — LORAZEPAM 0.5 MG: 2 INJECTION INTRAMUSCULAR; INTRAVENOUS at 14:25

## 2017-01-01 RX ADMIN — TAZOBACTAM SODIUM AND PIPERACILLIN SODIUM 3.38 G: 375; 3 INJECTION, SOLUTION INTRAVENOUS at 03:21

## 2017-01-01 RX ADMIN — IPRATROPIUM BROMIDE 6 PUFF: 17 AEROSOL, METERED RESPIRATORY (INHALATION) at 19:43

## 2017-01-01 RX ADMIN — WARFARIN SODIUM 5 MG: 5 TABLET ORAL at 23:20

## 2017-01-01 RX ADMIN — BUSPIRONE HYDROCHLORIDE 10 MG: 10 TABLET ORAL at 17:50

## 2017-01-01 RX ADMIN — POTASSIUM CHLORIDE 10 MEQ: 7.46 INJECTION, SOLUTION INTRAVENOUS at 00:15

## 2017-01-01 RX ADMIN — NITROGLYCERIN 0.4 MG: 0.4 TABLET SUBLINGUAL at 11:35

## 2017-01-01 RX ADMIN — BUPROPION HYDROCHLORIDE 150 MG: 150 TABLET, EXTENDED RELEASE ORAL at 21:28

## 2017-01-01 RX ADMIN — INSULIN ASPART 3 UNITS: 100 INJECTION, SOLUTION INTRAVENOUS; SUBCUTANEOUS at 21:55

## 2017-01-01 RX ADMIN — FENTANYL CITRATE 25 MCG: 50 INJECTION INTRAMUSCULAR; INTRAVENOUS at 19:22

## 2017-01-01 RX ADMIN — FENTANYL CITRATE 25 MCG: 50 INJECTION INTRAMUSCULAR; INTRAVENOUS at 20:40

## 2017-01-01 RX ADMIN — COSYNTROPIN 0.25 MG: 0.25 INJECTION, POWDER, LYOPHILIZED, FOR SOLUTION INTRAMUSCULAR; INTRAVENOUS at 13:30

## 2017-01-01 RX ADMIN — NOREPINEPHRINE BITARTRATE 0.29 MCG/KG/MIN: 8 SOLUTION at 03:38

## 2017-01-01 RX ADMIN — TAZOBACTAM SODIUM AND PIPERACILLIN SODIUM 3.38 G: 375; 3 INJECTION, SOLUTION INTRAVENOUS at 03:26

## 2017-01-01 RX ADMIN — MIDODRINE HYDROCHLORIDE 10 MG: 5 TABLET ORAL at 17:15

## 2017-01-01 RX ADMIN — INSULIN DETEMIR 10 UNITS: 100 INJECTION, SOLUTION SUBCUTANEOUS at 06:10

## 2017-01-01 RX ADMIN — DILTIAZEM HYDROCHLORIDE 60 MG: 60 TABLET, FILM COATED ORAL at 08:38

## 2017-01-01 RX ADMIN — SEVELAMER CARBONATE 800 MG: 800 TABLET, FILM COATED ORAL at 08:10

## 2017-01-01 RX ADMIN — INSULIN DETEMIR 10 UNITS: 100 INJECTION, SOLUTION SUBCUTANEOUS at 06:35

## 2017-01-01 RX ADMIN — WARFARIN SODIUM 5 MG: 5 TABLET ORAL at 22:00

## 2017-01-01 RX ADMIN — FENTANYL CITRATE 25 MCG: 50 INJECTION INTRAMUSCULAR; INTRAVENOUS at 09:35

## 2017-01-01 RX ADMIN — BUPROPION HYDROCHLORIDE 150 MG: 150 TABLET, EXTENDED RELEASE ORAL at 20:45

## 2017-01-01 RX ADMIN — IPRATROPIUM BROMIDE 6 PUFF: 17 AEROSOL, METERED RESPIRATORY (INHALATION) at 23:13

## 2017-01-01 RX ADMIN — DILTIAZEM HYDROCHLORIDE 5 MG/HR: 100 INJECTION, POWDER, LYOPHILIZED, FOR SOLUTION INTRAVENOUS at 17:56

## 2017-01-01 RX ADMIN — DIPHENHYDRAMINE HYDROCHLORIDE 25 MG: 25 CAPSULE ORAL at 21:57

## 2017-01-01 RX ADMIN — DILTIAZEM HYDROCHLORIDE 60 MG: 60 TABLET, FILM COATED ORAL at 13:23

## 2017-01-01 RX ADMIN — NOREPINEPHRINE BITARTRATE 0.15 MCG/KG/MIN: 8 SOLUTION at 03:40

## 2017-01-01 RX ADMIN — ACETAMINOPHEN 650 MG: 325 TABLET ORAL at 13:10

## 2017-01-01 RX ADMIN — FENTANYL CITRATE 25 MCG: 50 INJECTION INTRAMUSCULAR; INTRAVENOUS at 01:07

## 2017-01-01 RX ADMIN — ROSUVASTATIN CALCIUM 10 MG: 10 TABLET, FILM COATED ORAL at 08:38

## 2017-01-01 RX ADMIN — ALBUTEROL SULFATE 6 PUFF: 90 AEROSOL, METERED RESPIRATORY (INHALATION) at 03:17

## 2017-01-01 RX ADMIN — BUSPIRONE HYDROCHLORIDE 10 MG: 10 TABLET ORAL at 09:17

## 2017-01-01 RX ADMIN — MIDODRINE HYDROCHLORIDE 10 MG: 5 TABLET ORAL at 08:40

## 2017-01-01 RX ADMIN — PROPOFOL 40 MCG/KG/MIN: 10 INJECTION, EMULSION INTRAVENOUS at 01:12

## 2017-01-01 RX ADMIN — LOPERAMIDE HYDROCHLORIDE 2 MG: 2 CAPSULE ORAL at 17:03

## 2017-01-01 RX ADMIN — HEPARIN SODIUM 4000 UNITS: 1000 INJECTION, SOLUTION INTRAVENOUS; SUBCUTANEOUS at 20:51

## 2017-01-01 RX ADMIN — BUPROPION HYDROCHLORIDE 150 MG: 150 TABLET, EXTENDED RELEASE ORAL at 17:32

## 2017-01-01 RX ADMIN — SEVELAMER CARBONATE 800 MG: 800 TABLET, FILM COATED ORAL at 13:10

## 2017-01-01 RX ADMIN — ERYTHROPOIETIN 4000 UNITS: 4000 INJECTION, SOLUTION INTRAVENOUS; SUBCUTANEOUS at 11:40

## 2017-01-01 RX ADMIN — ATORVASTATIN CALCIUM 20 MG: 20 TABLET, FILM COATED ORAL at 08:25

## 2017-01-01 RX ADMIN — SEVELAMER CARBONATE 800 MG: 800 TABLET, FILM COATED ORAL at 09:28

## 2017-01-01 RX ADMIN — FENTANYL CITRATE 25 MCG: 50 INJECTION INTRAMUSCULAR; INTRAVENOUS at 23:30

## 2017-01-01 RX ADMIN — BISOPROLOL FUMARATE 10 MG: 5 TABLET ORAL at 09:16

## 2017-01-01 RX ADMIN — GABAPENTIN 100 MG: 100 CAPSULE ORAL at 23:20

## 2017-01-01 RX ADMIN — IPRATROPIUM BROMIDE 6 PUFF: 17 AEROSOL, METERED RESPIRATORY (INHALATION) at 03:17

## 2017-01-01 RX ADMIN — FENTANYL CITRATE 25 MCG: 50 INJECTION INTRAMUSCULAR; INTRAVENOUS at 01:42

## 2017-01-01 RX ADMIN — LOSARTAN POTASSIUM 50 MG: 50 TABLET, FILM COATED ORAL at 21:28

## 2017-01-01 RX ADMIN — MORPHINE SULFATE 4 MG: 4 INJECTION, SOLUTION INTRAMUSCULAR; INTRAVENOUS at 20:54

## 2017-01-01 RX ADMIN — INSULIN ASPART 2 UNITS: 100 INJECTION, SOLUTION INTRAVENOUS; SUBCUTANEOUS at 13:15

## 2017-01-01 RX ADMIN — WARFARIN SODIUM 1 MG: 1 TABLET ORAL at 17:57

## 2017-01-01 RX ADMIN — POTASSIUM CHLORIDE 10 MEQ: 7.46 INJECTION, SOLUTION INTRAVENOUS at 02:23

## 2017-01-01 RX ADMIN — NOREPINEPHRINE BITARTRATE 0.09 MCG/KG/MIN: 8 SOLUTION at 12:05

## 2017-01-01 RX ADMIN — ERGOCALCIFEROL 50000 UNITS: 1.25 CAPSULE ORAL at 23:20

## 2017-01-01 RX ADMIN — LOPERAMIDE HYDROCHLORIDE 2 MG: 2 CAPSULE ORAL at 21:57

## 2017-01-01 RX ADMIN — POTASSIUM CHLORIDE 10 MEQ: 7.46 INJECTION, SOLUTION INTRAVENOUS at 15:29

## 2017-01-01 RX ADMIN — DILTIAZEM HYDROCHLORIDE 60 MG: 60 TABLET, FILM COATED ORAL at 20:46

## 2017-01-01 RX ADMIN — HEPARIN SODIUM 5000 UNITS: 5000 INJECTION, SOLUTION INTRAVENOUS; SUBCUTANEOUS at 06:14

## 2017-01-01 RX ADMIN — TAZOBACTAM SODIUM AND PIPERACILLIN SODIUM 3.38 G: 375; 3 INJECTION, SOLUTION INTRAVENOUS at 19:46

## 2017-01-01 RX ADMIN — FENTANYL CITRATE 75 MCG: 50 INJECTION INTRAMUSCULAR; INTRAVENOUS at 14:14

## 2017-01-01 RX ADMIN — FENTANYL CITRATE 25 MCG: 50 INJECTION INTRAMUSCULAR; INTRAVENOUS at 11:52

## 2017-01-01 RX ADMIN — NOREPINEPHRINE BITARTRATE 0.08 MCG/KG/MIN: 8 SOLUTION at 13:39

## 2017-01-01 RX ADMIN — SODIUM CHLORIDE 1000 ML: 9 INJECTION, SOLUTION INTRAVENOUS at 12:15

## 2017-01-01 RX ADMIN — TORSEMIDE 40 MG: 20 TABLET ORAL at 13:24

## 2017-01-01 RX ADMIN — HEPARIN SODIUM 5000 UNITS: 5000 INJECTION, SOLUTION INTRAVENOUS; SUBCUTANEOUS at 21:58

## 2017-01-01 RX ADMIN — TAZOBACTAM SODIUM AND PIPERACILLIN SODIUM 3.38 G: 375; 3 INJECTION, SOLUTION INTRAVENOUS at 00:33

## 2017-01-01 RX ADMIN — SEVELAMER CARBONATE 800 MG: 800 TABLET, FILM COATED ORAL at 19:45

## 2017-01-01 RX ADMIN — FENTANYL CITRATE 25 MCG: 50 INJECTION INTRAMUSCULAR; INTRAVENOUS at 04:35

## 2017-01-01 RX ADMIN — SEVELAMER CARBONATE 800 MG: 800 TABLET, FILM COATED ORAL at 17:57

## 2017-01-01 RX ADMIN — BUSPIRONE HYDROCHLORIDE 10 MG: 10 TABLET ORAL at 20:45

## 2017-01-01 RX ADMIN — FAMOTIDINE 20 MG: 10 INJECTION INTRAVENOUS at 08:04

## 2017-01-01 RX ADMIN — MIDODRINE HYDROCHLORIDE 10 MG: 5 TABLET ORAL at 08:11

## 2017-01-01 RX ADMIN — BUDESONIDE AND FORMOTEROL FUMARATE DIHYDRATE 1 PUFF: 160; 4.5 AEROSOL RESPIRATORY (INHALATION) at 08:39

## 2017-01-01 RX ADMIN — GLYCOPYRROLATE 0.1 MG: 0.2 INJECTION, SOLUTION INTRAMUSCULAR; INTRAVENOUS at 21:48

## 2017-01-01 RX ADMIN — WARFARIN SODIUM 2.5 MG: 2.5 TABLET ORAL at 19:45

## 2017-01-01 RX ADMIN — POTASSIUM CHLORIDE 10 MEQ: 7.46 INJECTION, SOLUTION INTRAVENOUS at 01:15

## 2017-01-01 RX ADMIN — LORAZEPAM 0.5 MG: 2 INJECTION INTRAMUSCULAR; INTRAVENOUS at 22:54

## 2017-01-01 RX ADMIN — MIDODRINE HYDROCHLORIDE 10 MG: 5 TABLET ORAL at 08:10

## 2017-01-01 RX ADMIN — BUSPIRONE HYDROCHLORIDE 10 MG: 10 TABLET ORAL at 08:25

## 2017-01-01 RX ADMIN — GABAPENTIN 100 MG: 100 CAPSULE ORAL at 21:38

## 2017-01-01 RX ADMIN — ALBUTEROL SULFATE 6 PUFF: 90 AEROSOL, METERED RESPIRATORY (INHALATION) at 23:13

## 2017-01-01 RX ADMIN — DILTIAZEM HYDROCHLORIDE 10 MG/HR: 100 INJECTION, POWDER, LYOPHILIZED, FOR SOLUTION INTRAVENOUS at 00:37

## 2017-01-01 RX ADMIN — MIDODRINE HYDROCHLORIDE 10 MG: 5 TABLET ORAL at 17:03

## 2017-01-01 RX ADMIN — FAMOTIDINE 20 MG: 10 INJECTION, SOLUTION INTRAVENOUS at 15:20

## 2017-01-01 RX ADMIN — BUSPIRONE HYDROCHLORIDE 10 MG: 10 TABLET ORAL at 13:23

## 2017-01-01 RX ADMIN — NOREPINEPHRINE BITARTRATE 0.15 MCG/KG/MIN: 8 SOLUTION at 21:44

## 2017-01-01 RX ADMIN — MANNITOL 25 G: 250 INJECTION, SOLUTION INTRAVENOUS at 11:39

## 2017-01-01 RX ADMIN — PROPOFOL 30 MCG/KG/MIN: 10 INJECTION, EMULSION INTRAVENOUS at 14:53

## 2017-01-01 RX ADMIN — SODIUM CHLORIDE 1000 ML: 9 INJECTION, SOLUTION INTRAVENOUS at 13:12

## 2017-01-01 RX ADMIN — MIDODRINE HYDROCHLORIDE 5 MG: 5 TABLET ORAL at 06:35

## 2017-01-01 RX ADMIN — TAZOBACTAM SODIUM AND PIPERACILLIN SODIUM 3.38 G: 375; 3 INJECTION, SOLUTION INTRAVENOUS at 02:21

## 2017-01-01 RX ADMIN — WARFARIN SODIUM 5 MG: 5 TABLET ORAL at 17:32

## 2017-01-01 RX ADMIN — MIDODRINE HYDROCHLORIDE 10 MG: 5 TABLET ORAL at 11:44

## 2017-01-01 RX ADMIN — Medication 500 MCG: at 21:28

## 2017-01-01 RX ADMIN — IPRATROPIUM BROMIDE 6 PUFF: 17 AEROSOL, METERED RESPIRATORY (INHALATION) at 06:58

## 2017-01-01 RX ADMIN — FAMOTIDINE 20 MG: 10 INJECTION, SOLUTION INTRAVENOUS at 08:16

## 2017-01-01 RX ADMIN — TAZOBACTAM SODIUM AND PIPERACILLIN SODIUM 3.38 G: 375; 3 INJECTION, SOLUTION INTRAVENOUS at 15:42

## 2017-01-01 RX ADMIN — IPRATROPIUM BROMIDE 6 PUFF: 17 AEROSOL, METERED RESPIRATORY (INHALATION) at 19:35

## 2017-01-01 RX ADMIN — ATORVASTATIN CALCIUM 20 MG: 20 TABLET, FILM COATED ORAL at 08:15

## 2017-01-01 RX ADMIN — NOREPINEPHRINE BITARTRATE 0.16 MCG/KG/MIN: 8 SOLUTION at 14:23

## 2017-01-01 RX ADMIN — BUSPIRONE HYDROCHLORIDE 10 MG: 10 TABLET ORAL at 22:06

## 2017-01-01 RX ADMIN — ATORVASTATIN CALCIUM 20 MG: 20 TABLET, FILM COATED ORAL at 09:17

## 2017-01-01 RX ADMIN — FAMOTIDINE 20 MG: 10 INJECTION, SOLUTION INTRAVENOUS at 08:10

## 2017-01-01 RX ADMIN — POTASSIUM CHLORIDE 10 MEQ: 7.46 INJECTION, SOLUTION INTRAVENOUS at 16:44

## 2017-01-01 RX ADMIN — HYDROXYZINE PAMOATE 25 MG: 25 CAPSULE ORAL at 23:15

## 2017-01-01 RX ADMIN — ONDANSETRON 4 MG: 2 INJECTION INTRAMUSCULAR; INTRAVENOUS at 13:23

## 2017-01-01 RX ADMIN — BUSPIRONE HYDROCHLORIDE 10 MG: 10 TABLET ORAL at 17:31

## 2017-01-01 RX ADMIN — EPINEPHRINE 0.02 MCG/KG/MIN: 1 INJECTION PARENTERAL at 12:50

## 2017-01-01 RX ADMIN — TAZOBACTAM SODIUM AND PIPERACILLIN SODIUM 3.38 G: 375; 3 INJECTION, SOLUTION INTRAVENOUS at 03:48

## 2017-01-01 RX ADMIN — ALBUTEROL SULFATE 6 PUFF: 90 AEROSOL, METERED RESPIRATORY (INHALATION) at 19:35

## 2017-01-01 RX ADMIN — BUDESONIDE AND FORMOTEROL FUMARATE DIHYDRATE 1 PUFF: 160; 4.5 AEROSOL RESPIRATORY (INHALATION) at 19:14

## 2017-01-01 RX ADMIN — Medication 500 MCG: at 13:23

## 2017-01-01 RX ADMIN — BUSPIRONE HYDROCHLORIDE 10 MG: 10 TABLET ORAL at 08:14

## 2017-01-01 RX ADMIN — DILTIAZEM HYDROCHLORIDE 60 MG: 60 TABLET, FILM COATED ORAL at 09:17

## 2017-01-01 RX ADMIN — TAZOBACTAM SODIUM AND PIPERACILLIN SODIUM 4.5 G: 500; 4 INJECTION, SOLUTION INTRAVENOUS at 12:27

## 2017-01-01 RX ADMIN — FENTANYL CITRATE 25 MCG: 50 INJECTION INTRAMUSCULAR; INTRAVENOUS at 14:05

## 2017-01-01 RX ADMIN — POTASSIUM CHLORIDE 20 MEQ: 750 CAPSULE, EXTENDED RELEASE ORAL at 10:39

## 2017-01-01 RX ADMIN — TAZOBACTAM SODIUM AND PIPERACILLIN SODIUM 3.38 G: 375; 3 INJECTION, SOLUTION INTRAVENOUS at 03:10

## 2017-01-01 RX ADMIN — BUDESONIDE AND FORMOTEROL FUMARATE DIHYDRATE 1 PUFF: 160; 4.5 AEROSOL RESPIRATORY (INHALATION) at 08:19

## 2017-01-01 RX ADMIN — MIDODRINE HYDROCHLORIDE 10 MG: 5 TABLET ORAL at 11:11

## 2017-01-01 RX ADMIN — MORPHINE SULFATE 4 MG: 4 INJECTION, SOLUTION INTRAMUSCULAR; INTRAVENOUS at 11:51

## 2017-01-01 RX ADMIN — BUSPIRONE HYDROCHLORIDE 10 MG: 10 TABLET ORAL at 09:00

## 2017-01-01 RX ADMIN — NOREPINEPHRINE BITARTRATE 0.3 MCG/KG/MIN: 8 SOLUTION at 07:18

## 2017-01-01 RX ADMIN — MIDODRINE HYDROCHLORIDE 5 MG: 5 TABLET ORAL at 12:52

## 2017-01-01 RX ADMIN — MIDODRINE HYDROCHLORIDE 10 MG: 5 TABLET ORAL at 12:22

## 2017-01-01 RX ADMIN — MIDODRINE HYDROCHLORIDE 10 MG: 5 TABLET ORAL at 12:09

## 2017-01-01 RX ADMIN — SEVELAMER CARBONATE 800 MG: 800 TABLET, FILM COATED ORAL at 12:09

## 2017-01-01 RX ADMIN — SEVELAMER CARBONATE 800 MG: 800 TABLET, FILM COATED ORAL at 12:23

## 2017-01-01 RX ADMIN — MANNITOL 12.5 G: 250 INJECTION, SOLUTION INTRAVENOUS at 19:13

## 2017-01-01 RX ADMIN — ATORVASTATIN CALCIUM 20 MG: 20 TABLET, FILM COATED ORAL at 23:15

## 2017-01-01 RX ADMIN — MECLIZINE HCL 12.5 MG: 12.5 TABLET ORAL at 11:30

## 2017-01-01 RX ADMIN — TAZOBACTAM SODIUM AND PIPERACILLIN SODIUM 3.38 G: 375; 3 INJECTION, SOLUTION INTRAVENOUS at 16:04

## 2017-01-01 RX ADMIN — NOREPINEPHRINE BITARTRATE 0.04 MCG/KG/MIN: 8 SOLUTION at 05:50

## 2017-01-01 RX ADMIN — WARFARIN SODIUM 5 MG: 5 TABLET ORAL at 17:46

## 2017-01-01 RX ADMIN — BUDESONIDE AND FORMOTEROL FUMARATE DIHYDRATE 1 PUFF: 160; 4.5 AEROSOL RESPIRATORY (INHALATION) at 23:28

## 2017-01-01 RX ADMIN — POTASSIUM CHLORIDE 10 MEQ: 7.46 INJECTION, SOLUTION INTRAVENOUS at 23:06

## 2017-01-01 RX ADMIN — MIDODRINE HYDROCHLORIDE 10 MG: 5 TABLET ORAL at 06:58

## 2017-01-01 RX ADMIN — NOREPINEPHRINE BITARTRATE 0.08 MCG/KG/MIN: 8 SOLUTION at 02:36

## 2017-01-01 RX ADMIN — BUSPIRONE HYDROCHLORIDE 10 MG: 10 TABLET ORAL at 08:38

## 2017-01-01 RX ADMIN — GLYCERIN 2 DROP: .002; .002; .01 SOLUTION/ DROPS OPHTHALMIC at 14:08

## 2017-01-01 RX ADMIN — HEPARIN SODIUM 5000 UNITS: 5000 INJECTION, SOLUTION INTRAVENOUS; SUBCUTANEOUS at 13:26

## 2017-01-01 RX ADMIN — LOPERAMIDE HYDROCHLORIDE 2 MG: 2 CAPSULE ORAL at 08:16

## 2017-01-01 RX ADMIN — HYDROXYZINE PAMOATE 25 MG: 25 CAPSULE ORAL at 19:29

## 2017-01-01 RX ADMIN — MIDODRINE HYDROCHLORIDE 10 MG: 5 TABLET ORAL at 07:59

## 2017-01-01 RX ADMIN — INSULIN ASPART 2 UNITS: 100 INJECTION, SOLUTION INTRAVENOUS; SUBCUTANEOUS at 21:42

## 2017-01-01 RX ADMIN — Medication 500 MCG: at 09:17

## 2017-01-01 RX ADMIN — ALBUTEROL SULFATE 6 PUFF: 90 AEROSOL, METERED RESPIRATORY (INHALATION) at 11:53

## 2017-01-01 RX ADMIN — NOREPINEPHRINE BITARTRATE 0.02 MCG/KG/MIN: 8 SOLUTION at 14:39

## 2017-01-01 RX ADMIN — NOREPINEPHRINE BITARTRATE 0.1 MCG/KG/MIN: 8 SOLUTION at 04:51

## 2017-01-01 RX ADMIN — TAZOBACTAM SODIUM AND PIPERACILLIN SODIUM 3.38 G: 375; 3 INJECTION, SOLUTION INTRAVENOUS at 14:31

## 2017-01-01 RX ADMIN — NOREPINEPHRINE BITARTRATE 0.27 MCG/KG/MIN: 8 SOLUTION at 10:50

## 2017-01-01 RX ADMIN — INSULIN ASPART 2 UNITS: 100 INJECTION, SOLUTION INTRAVENOUS; SUBCUTANEOUS at 21:38

## 2017-01-01 RX ADMIN — FAMOTIDINE 20 MG: 10 INJECTION INTRAVENOUS at 19:43

## 2017-01-01 RX ADMIN — FENTANYL CITRATE 25 MCG: 50 INJECTION INTRAMUSCULAR; INTRAVENOUS at 11:34

## 2017-01-01 RX ADMIN — LOSARTAN POTASSIUM 50 MG: 50 TABLET, FILM COATED ORAL at 20:46

## 2017-01-01 RX ADMIN — Medication 5 MG: at 22:15

## 2017-01-01 RX ADMIN — TAZOBACTAM SODIUM AND PIPERACILLIN SODIUM 3.38 G: 375; 3 INJECTION, SOLUTION INTRAVENOUS at 13:45

## 2017-01-01 RX ADMIN — FENTANYL CITRATE 75 MCG: 50 INJECTION INTRAMUSCULAR; INTRAVENOUS at 11:07

## 2017-01-01 RX ADMIN — SEVELAMER CARBONATE 800 MG: 800 TABLET, FILM COATED ORAL at 21:06

## 2017-01-01 RX ADMIN — PROPOFOL 40 MCG/KG/MIN: 10 INJECTION, EMULSION INTRAVENOUS at 20:28

## 2017-01-01 RX ADMIN — FAMOTIDINE 20 MG: 10 INJECTION, SOLUTION INTRAVENOUS at 07:50

## 2017-01-01 RX ADMIN — TAZOBACTAM SODIUM AND PIPERACILLIN SODIUM 3.38 G: 375; 3 INJECTION, SOLUTION INTRAVENOUS at 15:21

## 2017-01-01 RX ADMIN — ALTEPLASE 2 MG: 2.2 INJECTION, POWDER, LYOPHILIZED, FOR SOLUTION INTRAVENOUS at 18:11

## 2017-01-01 RX ADMIN — MIDODRINE HYDROCHLORIDE 10 MG: 5 TABLET ORAL at 17:56

## 2017-01-01 RX ADMIN — NOREPINEPHRINE BITARTRATE 0.02 MCG/KG/MIN: 8 SOLUTION at 09:26

## 2017-01-01 RX ADMIN — WARFARIN SODIUM 5 MG: 5 TABLET ORAL at 17:03

## 2017-01-01 RX ADMIN — BISOPROLOL FUMARATE 10 MG: 5 TABLET ORAL at 08:14

## 2017-01-01 RX ADMIN — ACETAMINOPHEN 650 MG: 325 TABLET ORAL at 17:15

## 2017-01-01 RX ADMIN — ALBUTEROL SULFATE 6 PUFF: 90 AEROSOL, METERED RESPIRATORY (INHALATION) at 14:31

## 2017-01-01 RX ADMIN — SODIUM CHLORIDE 1000 ML: 9 INJECTION, SOLUTION INTRAVENOUS at 14:04

## 2017-01-01 RX ADMIN — GABAPENTIN 100 MG: 100 CAPSULE ORAL at 21:55

## 2017-01-01 RX ADMIN — FENTANYL CITRATE 25 MCG: 50 INJECTION INTRAMUSCULAR; INTRAVENOUS at 23:05

## 2017-01-01 RX ADMIN — BUDESONIDE AND FORMOTEROL FUMARATE DIHYDRATE 2 PUFF: 160; 4.5 AEROSOL RESPIRATORY (INHALATION) at 11:01

## 2017-01-01 RX ADMIN — NOREPINEPHRINE BITARTRATE 0.18 MCG/KG/MIN: 8 SOLUTION at 20:41

## 2017-01-01 RX ADMIN — ALBUTEROL SULFATE 6 PUFF: 90 AEROSOL, METERED RESPIRATORY (INHALATION) at 06:58

## 2017-01-01 RX ADMIN — NOREPINEPHRINE BITARTRATE 0.27 MCG/KG/MIN: 8 SOLUTION at 23:24

## 2017-01-01 RX ADMIN — HEPARIN SODIUM 5000 UNITS: 5000 INJECTION, SOLUTION INTRAVENOUS; SUBCUTANEOUS at 05:56

## 2017-01-01 RX ADMIN — NOREPINEPHRINE BITARTRATE 0.27 MCG/KG/MIN: 8 SOLUTION at 19:09

## 2017-01-01 RX ADMIN — WARFARIN SODIUM 5 MG: 5 TABLET ORAL at 16:49

## 2017-01-01 RX ADMIN — HEPARIN SODIUM 5000 UNITS: 5000 INJECTION, SOLUTION INTRAVENOUS; SUBCUTANEOUS at 12:52

## 2017-01-01 RX ADMIN — MIDODRINE HYDROCHLORIDE 10 MG: 5 TABLET ORAL at 19:46

## 2017-01-01 RX ADMIN — POTASSIUM CHLORIDE 20 MEQ: 750 CAPSULE, EXTENDED RELEASE ORAL at 12:49

## 2017-01-01 RX ADMIN — BISOPROLOL FUMARATE 10 MG: 5 TABLET ORAL at 20:46

## 2017-01-01 RX ADMIN — VANCOMYCIN HYDROCHLORIDE 2250 MG: 1 INJECTION, POWDER, LYOPHILIZED, FOR SOLUTION INTRAVENOUS at 13:16

## 2017-01-01 RX ADMIN — PHYTONADIONE 5 MG: 10 INJECTION, EMULSION INTRAMUSCULAR; INTRAVENOUS; SUBCUTANEOUS at 13:47

## 2017-01-01 RX ADMIN — ONDANSETRON 4 MG: 2 INJECTION INTRAMUSCULAR; INTRAVENOUS at 07:59

## 2017-01-01 RX ADMIN — HEPARIN SODIUM 5000 UNITS: 5000 INJECTION, SOLUTION INTRAVENOUS; SUBCUTANEOUS at 21:57

## 2017-01-01 RX ADMIN — BUPROPION HYDROCHLORIDE 150 MG: 150 TABLET, EXTENDED RELEASE ORAL at 08:25

## 2017-01-01 RX ADMIN — TAZOBACTAM SODIUM AND PIPERACILLIN SODIUM 3.38 G: 375; 3 INJECTION, SOLUTION INTRAVENOUS at 00:09

## 2017-01-01 RX ADMIN — INSULIN DETEMIR 10 UNITS: 100 INJECTION, SOLUTION SUBCUTANEOUS at 07:12

## 2017-01-01 RX ADMIN — LORAZEPAM 0.25 MG: 2 INJECTION INTRAMUSCULAR; INTRAVENOUS at 20:16

## 2017-01-01 RX ADMIN — FENTANYL CITRATE 25 MCG: 50 INJECTION INTRAMUSCULAR; INTRAVENOUS at 08:13

## 2017-01-01 RX ADMIN — NITROGLYCERIN 0.4 MG: 0.4 TABLET SUBLINGUAL at 08:14

## 2017-01-01 RX ADMIN — WARFARIN SODIUM 1 MG: 1 TABLET ORAL at 19:27

## 2017-01-01 RX ADMIN — TAZOBACTAM SODIUM AND PIPERACILLIN SODIUM 3.38 G: 375; 3 INJECTION, SOLUTION INTRAVENOUS at 14:01

## 2017-01-01 RX ADMIN — FENTANYL CITRATE 25 MCG: 50 INJECTION INTRAMUSCULAR; INTRAVENOUS at 16:30

## 2017-01-01 RX ADMIN — BUDESONIDE AND FORMOTEROL FUMARATE DIHYDRATE 1 PUFF: 160; 4.5 AEROSOL RESPIRATORY (INHALATION) at 08:24

## 2017-01-01 RX ADMIN — FAMOTIDINE 20 MG: 10 INJECTION, SOLUTION INTRAVENOUS at 08:14

## 2017-01-01 RX ADMIN — BUSPIRONE HYDROCHLORIDE 10 MG: 10 TABLET ORAL at 21:29

## 2017-01-01 RX ADMIN — MIDODRINE HYDROCHLORIDE 10 MG: 5 TABLET ORAL at 08:16

## 2017-02-01 NOTE — PROGRESS NOTES
Subjective   Keisha Nelson is a 71 y.o. female.   Chief Complaint   Patient presents with   • Fatigue   • Cough   • Nasal Congestion       History of Present Illness     71-year-old female with end-stage kidney disease on dialysis.    #1 fatigue, cough with yellow discharge for 3 weeks, runny nose with yellow drainage, sinus pressure.  Initially patient had subjective fever, chills on and off.  She was treated with Z-Marques for 3 days in dialysis and it helped some.  No other symptoms associated    The following portions of the patient's history were reviewed and updated as appropriate: allergies, current medications, past medical history, past social history and problem list.    Review of Systems   Constitutional: Positive for chills, fatigue and fever.   HENT: Positive for congestion and postnasal drip.    Respiratory: Positive for cough. Negative for wheezing.    Cardiovascular: Negative.          Objective   Wt Readings from Last 3 Encounters:   02/01/17 178 lb (80.7 kg)   12/14/16 191 lb (86.6 kg)      Vitals:    02/01/17 1454   BP: 138/78   Pulse: 97   Resp: 16   Temp: 98 °F (36.7 °C)   SpO2: 93%     Temp Readings from Last 3 Encounters:   02/01/17 98 °F (36.7 °C) (Oral)   12/14/16 97.7 °F (36.5 °C)     BP Readings from Last 3 Encounters:   02/01/17 138/78   12/14/16 130/80     Pulse Readings from Last 3 Encounters:   02/01/17 97   12/14/16 92       Physical Exam   Constitutional: She is oriented to person, place, and time. She appears well-developed and well-nourished.   HENT:   Head: Normocephalic and atraumatic.   Right Ear: Tympanic membrane, external ear and ear canal normal.   Left Ear: Tympanic membrane, external ear and ear canal normal.   Nose: Mucosal edema and rhinorrhea present.   Mouth/Throat: Oropharynx is clear and moist. No posterior oropharyngeal erythema.   Neck: Neck supple.   Cardiovascular: Normal rate, regular rhythm and normal heart sounds.    Pulmonary/Chest: Effort normal and breath  sounds normal. No respiratory distress. She has no wheezes.   Rhonchi in rt base.   Lymphadenopathy:     She has no cervical adenopathy.   Neurological: She is alert and oriented to person, place, and time.   Skin: Skin is warm, dry and intact.       Assessment/Plan   Keisha was seen today for fatigue, cough and nasal congestion.    Diagnoses and all orders for this visit:    Cough  -     XR Chest PA & Lateral; Future    Bronchitis        #1 acute bronchitis-we are getting chest x-ray.  Patient will start Mucinex 1200 mg twice a day.  She was prescribed today Augmentin 875 mg twice a day for 10 days for foot wound by another provider.  It will help with bronchitis as well, so I am not prescribing additional antibiotics at this time.  RTC if no improvement with treatment. If worsening-ER.

## 2017-02-24 PROBLEM — R53.1 WEAKNESS GENERALIZED: Status: ACTIVE | Noted: 2017-01-01

## 2017-02-24 PROBLEM — R26.89 IMBALANCE: Status: ACTIVE | Noted: 2017-01-01

## 2017-02-24 PROBLEM — R19.7 DIARRHEA: Status: ACTIVE | Noted: 2017-01-01

## 2017-02-24 PROBLEM — R42 DIZZINESS: Status: ACTIVE | Noted: 2017-01-01

## 2017-02-24 PROBLEM — E11.9 DM TYPE 2 (DIABETES MELLITUS, TYPE 2) (HCC): Status: ACTIVE | Noted: 2017-01-01

## 2017-02-24 PROBLEM — R42 LIGHTHEADEDNESS: Status: ACTIVE | Noted: 2017-01-01

## 2017-02-24 PROBLEM — I48.20 CHRONIC A-FIB (HCC): Status: ACTIVE | Noted: 2017-01-01

## 2017-02-24 PROBLEM — N18.6 ESRD (END STAGE RENAL DISEASE) (HCC): Status: ACTIVE | Noted: 2017-01-01

## 2017-02-25 PROBLEM — R42 DIZZINESS: Status: ACTIVE | Noted: 2017-01-01

## 2017-02-27 PROBLEM — R19.7 DIARRHEA: Status: RESOLVED | Noted: 2017-01-01 | Resolved: 2017-01-01

## 2017-03-29 PROBLEM — Z99.2 END STAGE RENAL FAILURE ON DIALYSIS (HCC): Status: ACTIVE | Noted: 2017-01-01

## 2017-03-29 PROBLEM — Z79.01 LONG TERM CURRENT USE OF ANTICOAGULANT: Status: ACTIVE | Noted: 2017-01-01

## 2017-03-29 PROBLEM — R53.82 CHRONIC FATIGUE: Status: ACTIVE | Noted: 2017-01-01

## 2017-03-29 PROBLEM — Z95.810 AUTOMATIC IMPLANTABLE CARDIOVERTER-DEFIBRILLATOR IN SITU: Status: ACTIVE | Noted: 2017-01-01

## 2017-03-29 PROBLEM — J43.2 CENTRILOBULAR EMPHYSEMA (HCC): Status: ACTIVE | Noted: 2017-01-01

## 2017-03-29 PROBLEM — E78.2 MULTIPLE-TYPE HYPERLIPIDEMIA: Status: ACTIVE | Noted: 2017-01-01

## 2017-03-29 NOTE — PATIENT INSTRUCTIONS
Check blood sugars randomly throughout the day and keep a log of sugar level and time of day it was checked. Call office with blood sugars   Continue current medications   Labs pending

## 2017-03-29 NOTE — PROGRESS NOTES
"Gertrude Nelson is a 72 y.o. female is being seen for consultation today at the request of Yamilet Zurita MD  Chief Complaint   Patient presents with   • Diabetes     no recent labs; provided new BG monitor;  used to test BG tid   • Hypertension     pt not taking any of the insulins due to not having any in a very long time.    • .     pt doesnt knw why she no longer takes Welchol. ER took her off of Zebeta   • .     no feeling in tips of fingers     /78  Ht 66\" (167.6 cm)  Wt 182 lb 3.2 oz (82.6 kg)  BMI 29.41 kg/m2  No Known Allergies  Current Outpatient Prescriptions on File Prior to Visit   Medication Sig   • albuterol (PROVENTIL) (2.5 MG/3ML) 0.083% nebulizer solution Take 2.5 mg by nebulization Every 4 (Four) Hours As Needed for wheezing.   • atorvastatin (LIPITOR) 20 MG tablet Take 1 tablet by mouth Daily.   • buPROPion SR (WELLBUTRIN SR) 150 MG 12 hr tablet Take 150 mg by mouth 2 (Two) Times a Day.   • busPIRone (BUSPAR) 10 MG tablet Take 1 tablet by mouth 2 (Two) Times a Day. (Patient taking differently: Take 10 mg by mouth 3 (Three) Times a Day.)   • diltiaZEM (CARDIZEM) 60 MG tablet Take 1 tablet by mouth Daily. (Patient taking differently: Take 60 mg by mouth 4 (Four) Times a Day.)   • fluticasone-salmeterol (ADVAIR) 250-50 MCG/DOSE DISKUS Inhale 2 (Two) Times a Day.   • furosemide (LASIX) 80 MG tablet Take 80 mg by mouth 2 (Two) Times a Day.   • hydrOXYzine (VISTARIL) 25 MG capsule Take 1 capsule by mouth 3 (Three) Times a Day As Needed for anxiety.   • nitroglycerin (NITROSTAT) 0.4 MG SL tablet Place 0.4 mg under the tongue Every 5 (Five) Minutes As Needed for chest pain. Take no more than 3 doses in 15 minutes.   • torsemide (DEMADEX) 20 MG tablet Take 40 mg by mouth Daily.   • vitamin B-12 (CYANOCOBALAMIN) 500 MCG tablet Take 500 mcg by mouth Daily.   • warfarin (COUMADIN) 5 MG tablet Take 5 mg by mouth Daily.   • bisoprolol (ZEBeta) 10 MG tablet Take 10 mg by mouth 2 (Two) " Times a Day.   • colesevelam (WELCHOL) 625 MG tablet Take 1,875 mg by mouth 2 (Two) Times a Day With Meals.   • Insulin Degludec 200 UNIT/ML solution pen-injector Inject  under the skin.   • insulin glargine (LANTUS) 100 UNIT/ML injection Inject  under the skin Daily.   • Insulin Lispro (HUMALOG KWIKPEN) 100 UNIT/ML solution pen-injector Inject  under the skin. Inject 25 to 35 units with each meal as directed.   • POTASSIUM CHLORIDE PO Take 20 mg by mouth.     No current facility-administered medications on file prior to visit.      Family History   Problem Relation Age of Onset   • Hypertension Mother    • Lung cancer Mother    • Heart attack Father    • Hypertension Father    • Breast cancer Sister    • Anxiety disorder Sister    • Alcohol abuse Brother      Social History     Social History   • Marital status: Single     Spouse name: N/A   • Number of children: N/A   • Years of education: N/A     Social History Main Topics   • Smoking status: Former Smoker   • Smokeless tobacco: None   • Alcohol use Yes   • Drug use: None   • Sexual activity: Not Asked     Other Topics Concern   • None     Social History Narrative         History of Present Illness  Encounter Diagnoses   Name Primary?   • Type 2 diabetes mellitus with complication, unspecified long term insulin use status Yes   • Benign essential hypertension    • Diabetic peripheral neuropathy    • Chronic kidney disease, stage IV (severe)      This is a 72-year-old female patient seen today as a new patient for type 2 diabetes.  She states she has had diabetes for approximately 20 years and that she has a positive family history for diabetes in both her mother and her father.  She also has a significant cardiac history which was reviewed.  The patient recently moved to [place] from [facility] and is establishing care at this time.  She states that she does not check her blood sugars at home and she was given a One Touch Verio glucometer at today's  visit.  Patient reports that she had a hypoglycemic episode approximately 1 year ago where she was found unresponsive and required hospitalization.  She states her blood sugar dropped because she took her insulin without eating and the reason why she didn't eat is related to a change in her taste buds which makes sweet foods taste very salty to her and meat taste bad.  Since this time she states she has been afraid to take any insulin for fear of hypoglycemia.  Patient also has a significant history for chronic kidney disease and is currently on dialysis Tuesday, Thursday, and Saturday. She is completely anuric. She also reports a hospitalization in February for hypotension and inability to ambulate.  The patient sees a podiatrist every 2 weeks for treatment of a wound on her right foot. Unfortunately, her right foot is currently wrapped and cannot be assessed at this time.  She states that her energy level is very poor but that this is unchanged.  She states that this may be related to dialysis and denies a personal or family history of thyroid disease.  It is noted that her blood pressure is currently stable.  Unfortunately the patient states that she is unsure of all of her medications but reported them to the best of her ability. She complains of chronic fatigue.  The following portions of the patient's history were reviewed and updated as appropriate: allergies, current medications, past family history, past medical history, past social history, past surgical history and problem list.    Review of Systems   Constitutional: Negative for fatigue.   HENT: Negative for trouble swallowing.    Eyes: Negative for visual disturbance.   Respiratory: Negative for shortness of breath.    Cardiovascular: Negative for leg swelling.   Gastrointestinal: Negative for nausea.   Endocrine: Negative for polyuria.   Genitourinary: Negative for urgency.   Musculoskeletal: Negative for joint swelling.   Skin: Negative for wound.    Allergic/Immunologic: Negative for immunocompromised state.   Neurological: Negative for numbness.   Hematological: Negative for adenopathy.   Psychiatric/Behavioral: The patient is not nervous/anxious.        Objective   Physical Exam   Constitutional: She is oriented to person, place, and time. She appears well-developed and well-nourished. No distress.   HENT:   Head: Normocephalic and atraumatic.   Right Ear: External ear normal.   Left Ear: External ear normal.   Nose: Nose normal.   Mouth/Throat: Oropharynx is clear and moist. No oropharyngeal exudate.   Eyes: EOM are normal. Pupils are equal, round, and reactive to light. Right eye exhibits no discharge. Left eye exhibits no discharge.   Neck: Trachea normal, normal range of motion and full passive range of motion without pain. Neck supple. No tracheal tenderness present. Carotid bruit is not present. No tracheal deviation, no edema and no erythema present. No thyroid mass and no thyromegaly present.   Cardiovascular: Normal rate, normal heart sounds and intact distal pulses.  Exam reveals no gallop and no friction rub.    No murmur heard.  Pulmonary/Chest: Effort normal and breath sounds normal. No stridor. No respiratory distress. She has no wheezes. She has no rales.   Abdominal: Soft. Bowel sounds are normal. She exhibits no distension.   Musculoskeletal: She exhibits no edema or deformity.   Rolling walker for ambulation   R foot wrapped per podiatry for open wound unable to assess    Lymphadenopathy:     She has no cervical adenopathy.   Neurological: She is alert and oriented to person, place, and time.   Skin: Skin is warm and dry. No rash noted. She is not diaphoretic. No erythema. No pallor.   Healed vertical scar down sternum    Psychiatric: She has a normal mood and affect. Her behavior is normal. Judgment and thought content normal.   Nursing note and vitals reviewed.    Lab Results   Component Value Date    GLUCOSE 114 (H) 02/27/2017    BUN 49  (H) 02/27/2017    CREATININE 7.20 (H) 02/27/2017    EGFRIFAFRI 7 (L) 02/27/2017    BCR 6.8 (L) 02/27/2017    K 4.5 02/27/2017    CO2 19.4 (L) 02/27/2017    CALCIUM 9.0 02/27/2017    ALBUMIN 3.30 (L) 02/24/2017    LABIL2 0.8 02/24/2017    AST 19 02/24/2017    ALT 14 02/24/2017       Assessment/Plan   Encounter Diagnoses   Name Primary?   • Type 2 diabetes mellitus with complication, unspecified long term insulin use status Yes   • Benign essential hypertension    • Diabetic peripheral neuropathy    • Chronic kidney disease, stage IV (severe)    • Chronic fatigue      In summary, the patient was seen and examined.  She will have extensive laboratory evaluation today and be notified of the results and any further recommendations.  She was given a One Touch Verio glucometer and instructed on its use.  I've asked that she check her blood sugars throughout the day and keep a record of the readings and times that it was checked. She is to call the office with 2 weeks worth of her blood sugar readings for further evaluation.  She is to continue to follow up with podiatry for treatment of her foot wound.  Her blood pressure is stable and she is to continue all her current medications.  I've also asked that when she contact the office with her blood sugar readings that she also update the office with her current medications.  I've encouraged her to contact the office with any further questions or concerns prior to her next appointment and follow-up in 4 months with labs prior.  Current medications:    Current Outpatient Prescriptions:   •  albuterol (PROVENTIL) (2.5 MG/3ML) 0.083% nebulizer solution, Take 2.5 mg by nebulization Every 4 (Four) Hours As Needed for wheezing., Disp: , Rfl:   •  atorvastatin (LIPITOR) 20 MG tablet, Take 1 tablet by mouth Daily., Disp: 90 tablet, Rfl: 1  •  budesonide-formoterol (SYMBICORT) 160-4.5 MCG/ACT inhaler, , Disp: , Rfl:   •  buPROPion SR (WELLBUTRIN SR) 150 MG 12 hr tablet, Take 150 mg by  mouth 2 (Two) Times a Day., Disp: , Rfl:   •  busPIRone (BUSPAR) 10 MG tablet, Take 1 tablet by mouth 2 (Two) Times a Day. (Patient taking differently: Take 10 mg by mouth 3 (Three) Times a Day.), Disp: 60 tablet, Rfl: 2  •  diltiaZEM (CARDIZEM) 60 MG tablet, Take 1 tablet by mouth Daily. (Patient taking differently: Take 60 mg by mouth 4 (Four) Times a Day.), Disp: 90 tablet, Rfl: 0  •  fluticasone-salmeterol (ADVAIR) 250-50 MCG/DOSE DISKUS, Inhale 2 (Two) Times a Day., Disp: , Rfl:   •  furosemide (LASIX) 80 MG tablet, Take 80 mg by mouth 2 (Two) Times a Day., Disp: , Rfl:   •  gabapentin (NEURONTIN) 100 MG capsule, TK 1 C PO QHS, Disp: , Rfl:   •  hydrOXYzine (VISTARIL) 25 MG capsule, Take 1 capsule by mouth 3 (Three) Times a Day As Needed for anxiety., Disp: 25 capsule, Rfl: 0  •  nitroglycerin (NITROSTAT) 0.4 MG SL tablet, Place 0.4 mg under the tongue Every 5 (Five) Minutes As Needed for chest pain. Take no more than 3 doses in 15 minutes., Disp: , Rfl:   •  torsemide (DEMADEX) 20 MG tablet, Take 40 mg by mouth Daily., Disp: , Rfl:   •  vitamin B-12 (CYANOCOBALAMIN) 500 MCG tablet, Take 500 mcg by mouth Daily., Disp: , Rfl:   •  warfarin (COUMADIN) 5 MG tablet, Take 5 mg by mouth Daily., Disp: , Rfl:   •  bisoprolol (ZEBeta) 10 MG tablet, Take 10 mg by mouth 2 (Two) Times a Day., Disp: , Rfl:   •  colesevelam (WELCHOL) 625 MG tablet, Take 1,875 mg by mouth 2 (Two) Times a Day With Meals., Disp: , Rfl:   •  glucose blood test strip, USE TO TEST BG TID, Disp: 300 each, Rfl: 2  •  ONETOUCH DELICA LANCETS 33G misc, USE TO TEST BG TID, Disp: 300 each, Rfl: 2  •  POTASSIUM CHLORIDE PO, Take 20 mg by mouth., Disp: , Rfl:

## 2017-03-30 NOTE — TELEPHONE ENCOUNTER
CALLED PT INFORMED HER ON MED CHANGES AND LABS. PT STATED VERBAL UNDERSTANDING.      ----- Message from GUS Gasca sent at 3/30/2017  1:27 PM EDT -----  Please let patient know about her labs and medication changes

## 2017-04-29 PROBLEM — R53.1 GENERALIZED WEAKNESS: Status: ACTIVE | Noted: 2017-01-01

## 2017-05-24 PROBLEM — R53.1 GENERALIZED WEAKNESS: Status: RESOLVED | Noted: 2017-01-01 | Resolved: 2017-01-01

## 2017-06-21 NOTE — PROGRESS NOTES
Subjective   Keisha Nelson is a 72 y.o. female.   Chief Complaint   Patient presents with   • Congestive Heart Failure   • Hospital follow up       History of Present Illness     #1 acute on chronic congestive heart failure in a patient with end-stage renal disease.  Patient is here today for follow-up from hospital.  She was admitted to hospital from 6/13 to 6/14 for acute on chronic CHF.  She presented to hospital complaining of shortness of breath, weakness and LE edema.  She had a chest x-ray that showed congestive heart failure with pulmonary edema.  She was seen by nephrology.  She had hemodialysis during hospital stay and improved significantly.  She had echo done which showed ejection fraction of 60-65%, pulmonary hypertension and moderate to severe tricuspid regurgitation which was a chronic finding.  Patient reports no lower extremity edema.  She is short of breath.  She is scheduled for dialysis tomorrow.  She reports no change in medications during hospital stay.  She is requesting a referral to a different cardiologist.    The following portions of the patient's history were reviewed and updated as appropriate: allergies, current medications, past family history, past medical history, past social history, past surgical history and problem list.    Review of Systems   Constitutional: Positive for fatigue.   HENT: Positive for postnasal drip and rhinorrhea.    Respiratory: Positive for cough and shortness of breath. Negative for wheezing.    Cardiovascular: Negative for chest pain and leg swelling.         Objective   Wt Readings from Last 3 Encounters:   06/21/17 184 lb (83.5 kg)   05/24/17 184 lb (83.5 kg)   05/02/17 180 lb 12.4 oz (82 kg)      Vitals:    06/21/17 0835   BP: 130/80   Pulse: 110   Temp: 98.5 °F (36.9 °C)   SpO2: 98%     Temp Readings from Last 3 Encounters:   06/21/17 98.5 °F (36.9 °C)   05/24/17 98.2 °F (36.8 °C)   05/02/17 98 °F (36.7 °C) (Oral)     BP Readings from Last 3  Encounters:   06/21/17 130/80   05/24/17 120/60   05/02/17 106/79     Pulse Readings from Last 3 Encounters:   06/21/17 110   05/24/17 84   05/02/17 88       Physical Exam   Constitutional: She is oriented to person, place, and time. She appears well-developed and well-nourished. She is cooperative.   HENT:   Head: Normocephalic and atraumatic.   Neck: Neck supple. Carotid bruit is not present. No thyromegaly present.   Cardiovascular: Normal rate, regular rhythm and normal heart sounds.    No LE edema.   Pulmonary/Chest: Effort normal and breath sounds normal.   Neurological: She is alert and oriented to person, place, and time.   Skin: Skin is warm, dry and intact.   Psychiatric: She has a normal mood and affect. Her behavior is normal.       Assessment/Plan   Keisha was seen today for congestive heart failure and hospital follow up.    Diagnoses and all orders for this visit:    Chronic diastolic congestive heart failure  -     Ambulatory Referral to Cardiology    Pulmonary hypertension  -     Ambulatory Referral to Cardiology    History of aortic valve replacement  -     Ambulatory Referral to Cardiology    Chronic a-fib  -     Ambulatory Referral to Cardiology        #1 Acute on chronic congestive heart failure-hospital records were reviewed.  Medications were updated.  Blood work results and x-ray results were reviewed.  Patient is referred to a new cardiology group per her request.

## 2017-07-05 NOTE — PROGRESS NOTES
PATIENTINFORMATION    Date of Office Visit: 2017  Encounter Provider: Marli Johnson MD  Place of Service: [facility] CARDIOLOGY  Patient Name: Keisha Nelson  : 1945    Subjective:     Encounter Date:2017      Patient ID: Keisha Nelson is a 72 y.o. female.      History of Present Illness    This is a patient with a history of aortic valve replacement in the past.  She does not know the reason for her valve replacement.  She had a heart catheterization in 2014.  Her coronary arteries had only luminal irregularities.  She was diagnosed with pulmonary hypertension with a PA pressure of 50/15 with a mean PA pressure of 30 and a wedge pressure of 13.  She had an echo in 2017 which showed normal LV systolic function with an ejection fraction of 60-65%, mild right ventricular enlargement, moderate left atrial enlargement, mild right atrial enlargement, moderate to severe tricuspid regurgitation with a right ventricular systolic pressure of 72 , and moderate mitral regurgitation.  She has an ICD in place for unclear indications.  She has chronic atrial fibrillation.  She is anticoagulated with warfarin.  She is not on rate lowering medication due to end-stage renal disease on low blood pressure.  She has end-stage renal disease and is dialysis dependent.  She has diabetes, a history of hypertension or more problems with low blood pressure recently and hyperlipidemia.  She also reports COPD.    She comes here for a second opinion.  She has had a couple of hospitalizations in the last few months.  She was diagnosed in both cases with heart failure.  She is end-stage renal disease on hemodialysis.  She was given extra dialysis and discharged.  She comes in today for a second opinion because she's been feeling so poorly recently.  She says she takes 2 or 3 steps and has to stop and rest she is so short of breath.  She has noticed some right lower extremity  "swelling.  She denies chest pain.  She does not have orthopnea.  She does not know if she has paroxysmal nocturnal dyspnea because she says she has not slept in 4 days.  She says she just can't sleep at night.  She says her mouth is constantly dry and she eats ice.  She complains of a cough.    Review of Systems   Constitution: Positive for malaise/fatigue. Negative for fever, weight gain and weight loss.   HENT: Negative for ear pain, hearing loss, nosebleeds and sore throat.    Eyes: Negative for double vision, pain, vision loss in left eye and vision loss in right eye.   Cardiovascular:        See history of present illness.   Respiratory: Positive for cough, shortness of breath, sleep disturbances due to breathing and wheezing.    Endocrine: Negative for cold intolerance, heat intolerance and polyuria.   Skin: Negative for itching, poor wound healing and rash.   Musculoskeletal: Negative for joint pain, joint swelling and myalgias.   Gastrointestinal: Negative for abdominal pain, diarrhea, hematochezia, nausea and vomiting.   Genitourinary: Negative for hematuria and hesitancy.   Neurological: Negative for numbness, paresthesias and seizures.   Psychiatric/Behavioral: Negative for depression. The patient is not nervous/anxious.            ECG 12 Lead  Date/Time: 7/5/2017 12:10 PM  Performed by: JEREMIAH YOUNG  Authorized by: JEREMIAH YOUNG   Comparison: compared with previous ECG from 4/29/2017  Comparison to previous ECG: Heart rate is faster  Rhythm: atrial fibrillation  Ectopy: infrequent PVCs  BPM: 112  Conduction: right bundle branch block  Q waves: II, III, aVF, V4, V5 and V6  Clinical impression: abnormal ECG               Objective:     /78 (BP Location: Left arm)  Ht 62.5\" (158.8 cm)  Wt 184 lb (83.5 kg)  BMI 33.12 kg/m2 Body mass index is 33.12 kg/(m^2).     Physical Exam   Constitutional: She appears well-developed.   HENT:   Head: Normocephalic and atraumatic.   Eyes: Conjunctivae and " lids are normal. Pupils are equal, round, and reactive to light. Lids are everted and swept, no foreign bodies found.   Neck: Normal range of motion. No JVD present. Carotid bruit is not present. No tracheal deviation present. No thyroid mass present.   Cardiovascular: An irregularly irregular rhythm present. Tachycardia present.    Murmur heard.  Pulses:       Dorsalis pedis pulses are 2+ on the right side, and 2+ on the left side.   She has significant jugular venous distention   Pulmonary/Chest: Effort normal and breath sounds normal.   Abdominal: Normal appearance and bowel sounds are normal.   Musculoskeletal: Normal range of motion.   Neurological: She is alert. She has normal strength.   Skin: Skin is warm, dry and intact.   Psychiatric: She has a normal mood and affect. Her behavior is normal.   Vitals reviewed.      Lab Review:       Assessment/Plan:       1.  Shortness of breath.  This is likely multifactorial.  I wonder if she is getting enough dialysis or if they're leaving to much fluid.  It seems like she improves with dialysis.  I am going to check a right and left heart catheterization.  I want to make sure she does not have any new coronary disease.  I want to check her right heart pressures and to see if her wedge is high or there is any indication of chronic diastolic heart failure.  I want to see if she responds to adenosine challenge.  I don't think a repeat echo is helpful.  She had one last month.  2.  Atrial fibrillation.  She is tachycardic today but other recent EKGs do not show tachycardia.  She is not on any rate lowering medication because of problems with hypotension.  She is not on dig because of end-stage renal disease.  If she continues to be tachycardic and that seems to be part of the problem, I would consider an AV node ablation.  She does have a device in place and we are checking it today to see what kind it is.  3.  Aortic valve replacement with a tissue valve  4.  Recurrent  episodes of presumed diastolic heart failure.  She does have normal systolic function.  She does have moderate mitral regurgitation.  She is also dialysis-dependent may not be getting enough fluid removed with hemodialysis.  5.  COPD  6.  Sleep apnea  7.  End stage renal disease on hemodialysis  8.  Diabetes  9.  History of hypertension though more hypotension recently.  She is on Midrin.  10.  Hyperlipidemia  11.  Ventricular tachycardia.  She had her device checked today.  It has not been checked for over 2 years.  It is a [employer] device.  In the last 2 years she has had 2 episodes of ventricular tachycardia/ventricular fibrillation.  Both responded to antitachycardia pacing and she did not require shock.    Addendum: Her device shows that she spends greater than 6 hours a day with a heart rate over 139.  Her average heart rate is generally running right at 100 or little above.  I will get the results back on her heart catheterization but will strongly consider and AV node ablation.    Orders Placed This Encounter   Procedures   • ECG 12 Lead     This order was created via procedure documentation      Keisha Nelson   Home Medication Instructions LATASHA:    Printed on:07/05/17 1213   Medication Information                      Acetaminophen 500 MG tablet dispersible  Take 1 tablet by mouth.             albuterol (PROVENTIL) (2.5 MG/3ML) 0.083% nebulizer solution  Take 2.5 mg by nebulization Every 4 (Four) Hours As Needed for wheezing.             atorvastatin (LIPITOR) 20 MG tablet  Take 1 tablet by mouth Daily.             B Complex-C-Folic Acid (NEPHRO-TC RX PO)  Take 1 tablet by mouth.             budesonide-formoterol (SYMBICORT) 160-4.5 MCG/ACT inhaler               busPIRone (BUSPAR) 10 MG tablet  TAKE 1 TABLET BY MOUTH TWICE DAILY             colesevelam (WELCHOL) 625 MG tablet  Take 1,875 mg by mouth 2 (Two) Times a Day With Meals.             cyanocobalamin (VITAMIN B-12) 500 MCG tablet  Take 500 mcg  by mouth.             ergocalciferol (DRISDOL) 81983 UNITS capsule  Take 1 capsule by mouth 1 (One) Time Per Week.             Ferric Citrate 1  MG(FE) tablet  Take 1 tablet by mouth.             gabapentin (NEURONTIN) 100 MG capsule  TK 1 C PO QHS             hydrOXYzine (ATARAX) 25 MG tablet  Take 25 mg by mouth.             loperamide (IMODIUM) 2 MG capsule  Take 2 mg by mouth.             loratadine (CLARITIN) 10 MG tablet  Take 10 mg by mouth.             Melatonin 10 MG tablet  Take 2 tablets by mouth.             midodrine (PROAMATINE) 5 MG tablet  Take 1 tablet by mouth 3 (Three) Times a Day.             ONETOUCH DELICA LANCETS 33G misc  USE TO TEST BG TID             sevelamer (RENVELA) 2.4 G pack packet  Take  by mouth 3 (Three) Times a Day With Meals.             TRESIBA FLEXTOUCH 100 UNIT/ML solution pen-injector injection  Inject 10 Units under the skin Daily.             warfarin (COUMADIN) 5 MG tablet  Take 5 mg by mouth Daily.                        Marli Johnson MD  07/05/17  12:13 PM

## 2017-07-12 PROBLEM — J81.1 PULMONARY EDEMA: Status: ACTIVE | Noted: 2017-01-01

## 2017-07-12 PROBLEM — R57.0 CARDIOGENIC SHOCK (HCC): Status: ACTIVE | Noted: 2017-01-01

## 2017-07-12 PROBLEM — J96.01 ACUTE RESPIRATORY FAILURE WITH HYPOXIA (HCC): Status: ACTIVE | Noted: 2017-01-01

## 2017-07-12 NOTE — CONSULTS
[facility]                                                                                             Nephrology Initial Consult Note    Patient Identification:  Name: Keisha Nelson MRN: [id number]  Age: 72 y.o. : 1945  Sex: female  Date:2017    Requesting Physician: Dr. Saldaña, I spoke with him directly regarding the consult  Reason for Consultation: End-stage renal disease, dialysis management  Information from:patient/ family/ chart      History of Present Illness: This is a 72 y.o. year old female  who is well-known to me from previous admissions.  She has end-stage renal disease and is on dialysis .  She was here a few months ago with weakness and shortness of breath and her dry weight was adjusted at that time.  She was undergoing a left and right heart catheterization today to evaluate persistent dyspnea and patient suffered cardiac arrest, code was called status post chest compressions, she was resuscitated and is currently on Levophed for persistent hypotension.  Her echocardiogram and heart cath per Dr. Saldaña were unremarkable and no current cardiac reason for her hypotension.  Patient is currently intubated on the ventilator, unable to give any history.  I'm under the impression that she received her normal dialysis treatment yesterday.  She is mildly hypokalemic today and replacement has been ordered  Discussed with ICU RN      The following medical history and medications personally reviewed by me:    Problem List:   Patient Active Problem List    Diagnosis   • Chronic fatigue [R53.82]   • Automatic implantable cardioverter-defibrillator in situ [Z95.810]   • Dizziness [R42]   • Imbalance [R26.89]   • End stage renal failure on dialysis [N18.6, Z99.2]     Overview Note:     Overview:   Overview:   on dialysis since end of 2012     • Type 2  diabetes mellitus [E11.9]   • Chronic a-fib [I48.2]   • Centrilobular emphysema [J43.2]   • Multiple-type hyperlipidemia [E78.4]   • Long term current use of anticoagulant [Z79.01]   • Benign essential hypertension [I10]   • Congestive heart failure [I50.9]   • Chronic kidney disease, stage IV (severe) [N18.4]     Overview Note:     Overview:   Followed by Dr. Reynolds  Overview:   Followed by Dr. Reynolds     • Chronic obstructive pulmonary disease [J44.9]   • Primary hypertrophic cardiomyopathy [I42.2]   • History of aortic valve replacement [Z95.2]   • Cellulitis of lower extremity [L03.119]   • Systemic infection [A41.9]   • Diabetic hypoglycemia [E11.649]   • Calciphylaxis [E83.59]     Overview Note:     Overview:   Overview:   Abdomen     • Skin ulcer [L98.499]   • Hematochezia [K92.1]   • Periumbilic abdominal tenderness [R10.815]   • Chronic obstructive bronchitis [J44.9]   • Diabetic peripheral neuropathy [E11.42]   • History of prosthetic heart valve [Z95.2]   • Nonsustained ventricular tachycardia [I47.2]   • Bleeding internal hemorrhoids [K64.8]   • Chronic diastolic heart failure [I50.32]   • Major depressive disorder with single episode [F32.9]   • Dependence on renal dialysis [Z99.2]   • Long term current use of insulin [Z79.4]   • Depression [F32.9]   • Obstructive sleep apnea syndrome [G47.33]   • Pulmonary hypertension [I27.2]   • Edema [R60.9]   • Shortness of breath [R06.02]       Past Medical History:  Past Medical History:   Diagnosis Date   • Acute diastolic CHF (congestive heart failure)    • Anxiety    • Asthma    • Atrial fibrillation    • COPD (chronic obstructive pulmonary disease)    • Depression    • Diabetes mellitus    • ESRD (end stage renal disease)    • Hyperlipidemia    • Hypertension    • Long term current use of anticoagulant therapy    • Pulmonary hypertension    • Sleep apnea    • Tricuspid regurgitation        Past Surgical History:  Past Surgical History:   Procedure Laterality  Date   • AORTIC VALVE REPAIR/REPLACEMENT     • CARDIAC DEFIBRILLATOR PLACEMENT Left    • DIALYSIS FISTULA CREATION Right    • LAPAROSCOPIC CHOLECYSTECTOMY     • PARTIAL HYSTERECTOMY          Home Meds:   Prescriptions Prior to Admission   Medication Sig Dispense Refill Last Dose   • acetaminophen (TYLENOL) 500 MG tablet Take 500 mg by mouth Every 6 (Six) Hours As Needed for Mild Pain (1-3).   07/05/2017   • albuterol (PROVENTIL) (2.5 MG/3ML) 0.083% nebulizer solution Take 2.5 mg by nebulization Every 4 (Four) Hours As Needed for wheezing.   Past Week at Unknown time   • atorvastatin (LIPITOR) 20 MG tablet Take 20 mg by mouth Daily.   7/11/2017 at Unknown time   • B Complex-C-Folic Acid (NEPHRO-TC RX PO) Take 1 tablet by mouth Daily.   7/11/2017 at Unknown time   • budesonide-formoterol (SYMBICORT) 160-4.5 MCG/ACT inhaler Inhale 2 puffs 2 (Two) Times a Day.   7/11/2017 at Unknown time   • busPIRone (BUSPAR) 10 MG tablet Take 10 mg by mouth 2 (Two) Times a Day.   7/11/2017 at Unknown time   • cyanocobalamin (VITAMIN B-12) 500 MCG tablet Take 500 mcg by mouth Daily.   7/11/2017 at Unknown time   • gabapentin (NEURONTIN) 100 MG capsule Take 200 mg by mouth Daily.   7/11/2017 at Unknown time   • hydrOXYzine (ATARAX) 25 MG tablet Take 25 mg by mouth Every 8 (Eight) Hours As Needed for Anxiety.   7/11/2017 at Unknown time   • insulin degludec (TRESIBA FLEXTOUCH) 100 UNIT/ML solution pen-injector injection Inject 87 Units under the skin Daily.   7/11/2017 at Unknown time   • loratadine (CLARITIN) 10 MG tablet Take 10 mg by mouth Daily.   7/11/2017 at Unknown time   • midodrine (PROAMATINE) 5 MG tablet Take 5 mg by mouth See Admin Instructions. TAKE  1-2 TAB 3 TIMES/WEEKLY.  BRING TO DIALYSIS   7/11/2017 at Unknown time   • Sucroferric Oxyhydroxide 500 MG chewable tablet Chew 500 mg 3 (Three) Times a Day With Meals.   7/11/2017 at Unknown time   • vitamin D (ERGOCALCIFEROL) 74275 UNITS capsule capsule Take 50,000 Units by  mouth 1 (One) Time Per Week.   07/09/2017   • warfarin (COUMADIN) 7.5 MG tablet Take 7.5 mg by mouth See Admin Instructions. PER PT, TAKES 7.5MG ON MON, WEDS, FRI, SAT   07/08/2017   • colesevelam (WELCHOL) 625 MG tablet Take 1,875 mg by mouth 2 (Two) Times a Day With Meals.   2 WEEKS AGO   • loperamide (IMODIUM) 2 MG capsule Take 2 mg by mouth 4 (Four) Times a Day As Needed for Diarrhea.   2 WEEKS AGO   • Melatonin 10 MG tablet Take 2 tablets by mouth At Night As Needed.   Unknown at Unknown time   • warfarin (COUMADIN) 5 MG tablet Take 5 mg by mouth See Admin Instructions. PER PT, TAKES 5MG ON SUN, TUES AND THURS 07/09/2017       Current Meds:   Current Facility-Administered Medications   Medication Dose Route Frequency Provider Last Rate Last Dose   • EPINEPHrine (ADRENALIN) 5 mg in sodium chloride 0.9 % 250 mL (0.02 mg/mL) infusion  0.02 mcg/kg/min Intravenous Titrated Rizwan Saldaña MD   Stopped at 07/12/17 1339   • norepinephrine (LEVOPHED) 8 mg/250 mL (32 mcg/mL) in sodium chloride 0.9% infusion (premix)  0.02-0.3 mcg/kg/min Intravenous Titrated Rizwan Saldaña MD 12.33 mL/hr at 07/12/17 1339 0.08 mcg/kg/min at 07/12/17 1339   • potassium chloride 10 mEq in 100 mL IVPB  10 mEq Intravenous Q1H Toni Oconnell MD       • sodium chloride 0.9 % infusion  50 mL/hr Intravenous Continuous Rizwan Saldaña MD 50 mL/hr at 07/12/17 1034 50 mL/hr at 07/12/17 1034     Facility-Administered Medications Ordered in Other Encounters   Medication Dose Route Frequency Provider Last Rate Last Dose   • Propofol (DIPRIVAN) injection    PRN Francesco Johnson MD   100 mg at 07/12/17 1155   • succinylcholine (ANECTINE) injection   Intravenous PRN Francesco Johnson MD   100 mg at 07/12/17 1155       Allergies:  No Known Allergies    Social History:   Social History     Social History   • Marital status: Single     Spouse name: N/A   • Number of children: N/A   • Years of education: N/A     Social History Main Topics   • Smoking  "status: Former Smoker   • Smokeless tobacco: None   • Alcohol use No   • Drug use: No   • Sexual activity: Defer     Other Topics Concern   • None     Social History Narrative        Family History:  Family History   Problem Relation Age of Onset   • Hypertension Mother    • Lung cancer Mother    • Heart attack Father    • Hypertension Father    • Breast cancer Sister    • Anxiety disorder Sister    • Alcohol abuse Brother         Review of Systems: as per HPI, in addition:      Could not assess, patient is intubated and sedated on the ventilator    Physical Exam:  Vitals:   Temp (24hrs), Av.3 °F (36.8 °C), Min:97.9 °F (36.6 °C), Max:98.6 °F (37 °C)    /96 (BP Location: Left arm, Patient Position: Lying)  Pulse 97  Temp 98.6 °F (37 °C) (Oral)   Resp 18  Ht 66\" (167.6 cm)  Wt 185 lb 6.5 oz (84.1 kg)  SpO2 95%  BMI 29.93 kg/m2  Intake/Output:   No intake or output data in the 24 hours ending 17 1356     Wt Readings from Last 1 Encounters:   17 1345 185 lb 6.5 oz (84.1 kg)   17 1011 181 lb 4 oz (82.2 kg)     Exam:    General Appearance:  Intubated, sedated, on the ventilator, hypotensive on pressors    Head and Face:  Normocephalic, atraumatic, mucus membranes moist, oropharynx clear   Eyes:  No icterus, pupils equal round and reactive to light, extraocular movements intact    ENMT: Moist mucosa, tongue symmetric    Neck: Supple  no jugular venous distention  no thyromegaly   Pulmonary:  Respiratory effort: Normal  Auscultation of lungs: Clear bilaterally  No wheezes  No rhonchi  Good air movement, good expansion   Chest wall:  No tenderness or deformity   Cardiovascular:  Auscultation of the heart: Normal rhythm, no murmurs  No edema of extremities    Abdomen:  Abdomen: soft, non-tender, normal bowel sounds all four quadrants, no masses   Liver and spleen: no hepatosplenomegaly   Musculoskeletal: Digits and nails: normal  Normal range of motion  No joint swelling or gross deformities "    Skin: Skin inspection: color normal, no visible rashes or lesions  Skin palpation: texture, turgor normal, no palpable lesions   Lymphatic:  no cervical lymphadenopathy    Psychiatric: Judgement and insight: normal  Orientation to person place and time: normal  Mood and affect: normal       DATA:  Radiology and Labs:  I have personally reviewed pertinent old records, labs, meds and pertinent data from the patient chart.  I was called emergently to see the patient by Dr. Saldaña    Labs:   Recent Results (from the past 24 hour(s))   POC Glucose Fingerstick    Collection Time: 07/12/17 10:26 AM   Result Value Ref Range    Glucose 118 70 - 130 mg/dL   POC Protime / INR    Collection Time: 07/12/17 10:29 AM   Result Value Ref Range    Protime 23.4 (H) 12.8 - 15.2 seconds    INR 2.0 (H) 0.8 - 1.2   POC Protime / INR    Collection Time: 07/12/17 10:36 AM   Result Value Ref Range    Protime 23.4 seconds    INR 2.0 (A) 0.9 - 1.1   Adult Transthoracic Echo Limited    Collection Time: 07/12/17  1:05 PM   Result Value Ref Range    TR max erick 383.0 cm/sec    BH CV VAS BP LEFT /96 mmHg    Echo EF Estimated 35 %   POC Glucose Fingerstick    Collection Time: 07/12/17  1:42 PM   Result Value Ref Range    Glucose 134 (H) 70 - 130 mg/dL       Radiology:  Imaging Results (last 24 hours)     Procedure Component Value Units Date/Time    XR Chest 1 View [[id number]] Collected:  07/12/17 1329     Updated:  07/12/17 1334    Narrative:       ONE VIEW PORTABLE CHEST AT 12:57 PM     HISTORY: Cardiac arrest during catheter lab procedure.     There is prominent cardiomegaly with a pacemaker in place. This is  combined with considerable vascular congestion showing worsening from  04/29/2017 and consistent with changes of pulmonary edema. There is also  more localized dense consolidation in both upper lobes, right greater  than left and raising the concern of areas of possible aspiration  pneumonia. Continued follow-up evaluation is  recommended.     An ET tube ends in good position approximately 3.5 cm above the bev.     This report was finalized on 7/12/2017 1:31 PM by Dr. Coleman Turcios MD.                  Assessment:   Problem List:   End-stage renal disease  Status post cardiac arrest  Cardiogenic shock  Acute respiratory failure  Hypokalemia postdialysis  Hypotension status post code  Congestive heart failure  Type 2 diabetes mellitus  Pulmonary edema on chest x-ray secondary to cardiogenic shock      Plan:   Chest x-ray reviewed consistent with pulmonary edema, secondary to cardiogenic shock  Her electrolytes are otherwise acceptable so no need for hemodialysis  I'm not sure that she is stable enough for ultrafiltration even with SCUF  Her oxygenation is currently acceptable with current oxygen requirements  If oxygenation becomes an issue overnight could consider SCUF but right now my inclination is to allow her to stabilize overnight and evaluate for dialysis tomorrow  She does not look particularly volume overloaded on exam  Will plan to repeat chest x-ray later tonight post code  If hemodynamics improved later this evening will consider dialysis but right now I don't think she stable enough to tolerate it      Continue to monitor electrolytes and volume closely   Avoid IV contrast and nephrotoxic medications   Seen in ICU, discussed with Dr. Saldaña  Total of 35 minutes critical care time spent    Thank you very much for the referral.    I appreciate the opportunity to participate in this patient's care.  Please call with any questions or concerns.         Toni Oconnell M.D  [facility]  [phone]  7/12/2017        Dictation via Dragon dictation software

## 2017-07-12 NOTE — CONSULTS
Patient Identification:  Keisha Nelson  72 y.o.  female  1945  [id number]          LOS 0    Requesting physician: Dr Saldaña    Reason for Consultation:  Critical care management with resp failure and arrest during cardiac cath    History of Present Illness:   72-year-old -American female underwent cardiac catheterization this morning.  During procedure had acute cardiopulmonary arrest requiring intubation and mechanical ventilation.  Chest x-ray shows signs of aspiration.  Discussed with Dr. Saldaña at bedside in CCU.  Patient is sedated on ventilator and on Levophed for blood pressure control secondary to hypotension and shock.  Is starting to move extremities which is a good sign.  She has not been doing that up until now.  Chart reviewed.  No family currently available.  Discussed case with Dr. Saldaña.  Has a history of severe CHF with ejection fraction 35%.    Past Medical History:  Past Medical History:   Diagnosis Date   • Acute diastolic CHF (congestive heart failure)    • Anxiety    • Asthma    • Atrial fibrillation    • COPD (chronic obstructive pulmonary disease)    • Depression    • Diabetes mellitus    • ESRD (end stage renal disease)    • Hyperlipidemia    • Hypertension    • Long term current use of anticoagulant therapy    • Pulmonary hypertension    • Sleep apnea    • Tricuspid regurgitation        Past Surgical History:  Past Surgical History:   Procedure Laterality Date   • AORTIC VALVE REPAIR/REPLACEMENT     • CARDIAC DEFIBRILLATOR PLACEMENT Left    • DIALYSIS FISTULA CREATION Right    • LAPAROSCOPIC CHOLECYSTECTOMY     • PARTIAL HYSTERECTOMY          Home Meds:  Prescriptions Prior to Admission   Medication Sig Dispense Refill Last Dose   • acetaminophen (TYLENOL) 500 MG tablet Take 500 mg by mouth Every 6 (Six) Hours As Needed for Mild Pain (1-3).   07/05/2017   • albuterol (PROVENTIL) (2.5 MG/3ML) 0.083% nebulizer solution Take 2.5 mg by nebulization Every 4 (Four) Hours As  Needed for wheezing.   Past Week at Unknown time   • atorvastatin (LIPITOR) 20 MG tablet Take 20 mg by mouth Daily.   7/11/2017 at Unknown time   • B Complex-C-Folic Acid (NEPHRO-TC RX PO) Take 1 tablet by mouth Daily.   7/11/2017 at Unknown time   • budesonide-formoterol (SYMBICORT) 160-4.5 MCG/ACT inhaler Inhale 2 puffs 2 (Two) Times a Day.   7/11/2017 at Unknown time   • busPIRone (BUSPAR) 10 MG tablet Take 10 mg by mouth 2 (Two) Times a Day.   7/11/2017 at Unknown time   • cyanocobalamin (VITAMIN B-12) 500 MCG tablet Take 500 mcg by mouth Daily.   7/11/2017 at Unknown time   • gabapentin (NEURONTIN) 100 MG capsule Take 200 mg by mouth Daily.   7/11/2017 at Unknown time   • hydrOXYzine (ATARAX) 25 MG tablet Take 25 mg by mouth Every 8 (Eight) Hours As Needed for Anxiety.   7/11/2017 at Unknown time   • insulin degludec (TRESIBA FLEXTOUCH) 100 UNIT/ML solution pen-injector injection Inject 87 Units under the skin Daily.   7/11/2017 at Unknown time   • loratadine (CLARITIN) 10 MG tablet Take 10 mg by mouth Daily.   7/11/2017 at Unknown time   • midodrine (PROAMATINE) 5 MG tablet Take 5 mg by mouth See Admin Instructions. TAKE  1-2 TAB 3 TIMES/WEEKLY.  BRING TO DIALYSIS   7/11/2017 at Unknown time   • Sucroferric Oxyhydroxide 500 MG chewable tablet Chew 500 mg 3 (Three) Times a Day With Meals.   7/11/2017 at Unknown time   • vitamin D (ERGOCALCIFEROL) 53912 UNITS capsule capsule Take 50,000 Units by mouth 1 (One) Time Per Week.   07/09/2017   • warfarin (COUMADIN) 7.5 MG tablet Take 7.5 mg by mouth See Admin Instructions. PER PT, TAKES 7.5MG ON MON, WEDS, FRI, SAT   07/08/2017   • colesevelam (WELCHOL) 625 MG tablet Take 1,875 mg by mouth 2 (Two) Times a Day With Meals.   2 WEEKS AGO   • loperamide (IMODIUM) 2 MG capsule Take 2 mg by mouth 4 (Four) Times a Day As Needed for Diarrhea.   2 WEEKS AGO   • Melatonin 10 MG tablet Take 2 tablets by mouth At Night As Needed.   Unknown at Unknown time   • warfarin  "(COUMADIN) 5 MG tablet Take 5 mg by mouth See Admin Instructions. PER PT, TAKES 5MG ON SUN, TUES AND THURS 07/09/2017         Allergies:  No Known Allergies    Social History:   Social History     Social History   • Marital status: Single     Spouse name: N/A   • Number of children: N/A   • Years of education: N/A     Occupational History   • Not on file.     Social History Main Topics   • Smoking status: Former Smoker   • Smokeless tobacco: Not on file   • Alcohol use No   • Drug use: No   • Sexual activity: Defer     Other Topics Concern   • Not on file     Social History Narrative       Family History:  Family History   Problem Relation Age of Onset   • Hypertension Mother    • Lung cancer Mother    • Heart attack Father    • Hypertension Father    • Breast cancer Sister    • Anxiety disorder Sister    • Alcohol abuse Brother        Review of Systems:  Unobtainable secondary to altered mental status and sedated on vent    Objective:    PHYSICAL EXAM:    /96 (BP Location: Left arm, Patient Position: Lying)  Pulse 97  Temp 98.6 °F (37 °C) (Oral)   Resp 18  Ht 66\" (167.6 cm)  Wt 185 lb 6.5 oz (84.1 kg)  SpO2 95%  BMI 29.93 kg/m2 Body mass index is 29.93 kg/(m^2). 95% 185 lb 6.5 oz (84.1 kg)    GENERAL APPEARANCE:   · Well developed  · Well nourished  · Lethargic  EYES:    · PERRL                                                                           · Conjunctiva normal  · Sclera non-icteric.  HENT:   · Atraumatic, normocephalic  · External ears and nose normal  · Moist mucus membranes and no ulcers  NECK:  · Thyroid not enlarged  · Trachea midline   RESPIRATORY:    · Nonlabored breathing   · Scattered coarse breath sounds right greater than left  · No rales. No wheezing  · No dullness  CARDIOVASCULAR:    · RRR  · Normal S1, S2  · No murmur  · Lower extremity edema: none    GI:   · Bowel sounds normal  · Abdomen soft , non-distended, non-tender  · No abdominal masses.    MUSCULOSKELETAL:  · Normal " movement of extremities  · No tenderness, no deformities  · No clubbing or cyanosis   Skin:    · No visible rashes  · No palpable nodules  PSYCHIATRIC:  · Unobtainable sedated on vent  NEUROLOGIC:   Moving all extremities    Lab Review:      Lab Results   Component Value Date    WBC 7.32 07/10/2017    HGB 12.4 07/10/2017    HCT 37.6 07/10/2017    MCV 91.9 07/10/2017     07/10/2017            Lab Results   Component Value Date    CREATININE 6.08 (H) 07/10/2017    BUN 32 (H) 07/10/2017     07/10/2017    K 3.3 (L) 07/10/2017    CL 90 (L) 07/10/2017    CO2 23.5 07/10/2017        Lab Results   Component Value Date    CKTOTAL 79 02/25/2017    TROPONINT 0.127 (C) 04/30/2017                    Imaging reviewed  chest X-ray viewed shows upper lobe airspace disease right greater than left suggesting aspiration on top of vascular congestion       Assessment:  Acute cardiopulmonary arrest  Respiratory failure requiring intubation mechanical ventilation  Aspiration pneumonia  Pulmonary vascular congestion  Hypotensive cardiogenic shock on Levophed  Acute on chronic CHF with an ejection fraction of 35%  End-stage renal disease: Follows with Dr. daily  Obstructive sleep apnea  COPD  Chronic atrial fibrillation  History of valvular heart disease with aortic valve replacement    Recommendations:  Chest x-ray shows signs of likely aspiration event during cardiac arrest.  Add Zosyn and send respiratory culture.  Continue Levophed for hypotension/shock.  Make appropriate ventilator changes with repeat ABG.  Cardiology following  Nephrology consultation pending for end-stage renal disease  Bronchodilators for COPD symptoms  If neurologic status does not improve will require neurology consultation  ICU core measures    Cct: 35 min      Thank you for allowing me to participate in the care of this patient.  I will continue to follow along with you.      Norman Arizmendi MD  [facility], [facility]  Pulmonary and Critical  Care Medicine    7/12/2017  1:58 PM    EMR Dragon/Transcription disclaimer:     Much of this encounter note is an electronic transcription/translation of spoken language to printed text. The electronic translation of spoken language may permit erroneous, or at times, nonsensical words or phrases to be inadvertently transcribed; Although I have reviewed the note for such errors, some may still exist.

## 2017-07-12 NOTE — ANESTHESIA PROCEDURE NOTES
Airway  Urgency: elective    Date/Time: 7/12/2017 11:50 AM  End Time:7/12/2017 12:00 PM  Airway not difficult    General Information and Staff    Patient location during procedure: OR  Anesthesiologist: KORI YUEN    Indications and Patient Condition  Indications for airway management: cardio/pulmonary arrest and respiratory distress/failure    Preoxygenated: yes  MILS not maintained throughout  Mask difficulty assessment: 2 - vent by mask + OA or adjuvant +/- NMBA    Final Airway Details  Final airway type: endotracheal airway      Successful airway: ETT  Cuffed: yes   Successful intubation technique: direct laryngoscopy  Endotracheal tube insertion site: oral  Blade: Renée  Blade size: #3  ETT size: 7.5 mm  Cormack-Lehane Classification: grade IIa - partial view of glottis  Placement verified by: chest auscultation, capnometry and radiography   Cuff volume (mL): 10  Measured from: teeth  ETT to teeth (cm): 20  Number of attempts at approach: 1    Additional Comments  Called to code in Cath Lab for pt with acute respiratory failure, undergoing CPR upon our arrival.  Pt is given full narcotic and benzo reversal, regains pulse.  Becomes somewhat arousable but respiratory efforts are insufficient to keep sats above 88%.  After discussion with Cards about the necessity of her heart cath I elect to intubate her.  Pre O2 with 15L ambu, EZ mask vent with OAW, ETT placed/secured.  Placement confirmed with ETCO2 monitor and ascultation.  Pt. Is placed on a vent and left in the care of Cards attending and RT.

## 2017-07-12 NOTE — INTERVAL H&P NOTE
Persistent shortness of breath of unclear etiology referred for left and right heart catheterization.  H&P reviewed. The patient was examined and there are no changes to the H&P. I have explained the risks and benefits of the procedure to the patient.  The patient understands and agrees to proceed

## 2017-07-12 NOTE — PROGRESS NOTES
Pharmacy consult to dose Zosyn    Keisha Nelson is a 72 y.o. female 185 lb 6.5 oz (84.1 kg).  Pharmacy consulted to dose for PNA per Dr Arizmendi's request.    IV Anti-Infectives     Ordered     Dose/Rate Route Frequency Start Stop    07/12/17 1400  piperacillin-tazobactam (ZOSYN) 3.375 g in iso-osmotic dextrose 50 ml (premix)     Ordering Provider:  Norman Arizmendi MD    3.375 g  over 4 Hours Intravenous Every 12 Hours 07/12/17 1500           Estimated Creatinine Clearance: 9.4 mL/min (by C-G formula based on Cr of 6.08).  Creatinine   Date Value Ref Range Status   07/10/2017 6.08 (H) 0.57 - 1.00 mg/dL Final     WBC   Date Value Ref Range Status   07/10/2017 7.32 4.50 - 10.70 10*3/mm3 Final     Temp Readings from Last 2 Encounters:   07/12/17 98.6 °F (37 °C) (Oral)   06/21/17 98.5 °F (36.9 °C)       PLAN:  Pt is dialysis dependent. Will adjust Zosyn to 3.375 gm IV Q 12 hr infused over 4 hours per extended infusion protocol.  Will monitor and adjust as needed.  Thank you Dr Arizmendi for the consult.    Deb Rebolledo, PharmD, BCPS  07/12/17 2:01 PM

## 2017-07-12 NOTE — PLAN OF CARE
Problem: Patient Care Overview (Adult)  Goal: Plan of Care Review  Outcome: Ongoing (interventions implemented as appropriate)    07/12/17 8967   Coping/Psychosocial Response Interventions   Plan Of Care Reviewed With patient;family   Patient Care Overview   Progress no change   Outcome Evaluation   Outcome Summary/Follow up Plan Admitted to CCU S/P heart catheterization S/P Code Blue. Pt was intubated, and started on pressors, sheaths left in place. Pt more stable now. Plan to wean sedation, vent and pressors. Will continue to monitor.          Problem: Cardiac Catheterization with/without PCI (Adult)  Goal: Signs and Symptoms of Listed Potential Problems Will be Absent or Manageable (Cardiac Catheterization with/without PCI)  Outcome: Ongoing (interventions implemented as appropriate)    Problem: SAFETY - NON-VIOLENT RESTRAINT  Goal: Remains free of injury from restraints (Non-Violent Restraint)  Outcome: Ongoing (interventions implemented as appropriate)  Goal: Free from restraint(s) (Non-Violent Restraint)  Outcome: Ongoing (interventions implemented as appropriate)    Problem: Respiratory Insufficiency (Adult)  Goal: Identify Related Risk Factors and Signs and Symptoms  Outcome: Ongoing (interventions implemented as appropriate)  Goal: Acid/Base Balance  Outcome: Ongoing (interventions implemented as appropriate)  Goal: Effective Ventilation  Outcome: Ongoing (interventions implemented as appropriate)

## 2017-07-12 NOTE — H&P (VIEW-ONLY)
PATIENTINFORMATION    Date of Office Visit: 2017  Encounter Provider: Marli Johnson MD  Place of Service: [facility] CARDIOLOGY  Patient Name: Keisha Nelson  : 1945    Subjective:     Encounter Date:2017      Patient ID: Keisha Nelson is a 72 y.o. female.      History of Present Illness    This is a patient with a history of aortic valve replacement in the past.  She does not know the reason for her valve replacement.  She had a heart catheterization in 2014.  Her coronary arteries had only luminal irregularities.  She was diagnosed with pulmonary hypertension with a PA pressure of 50/15 with a mean PA pressure of 30 and a wedge pressure of 13.  She had an echo in 2017 which showed normal LV systolic function with an ejection fraction of 60-65%, mild right ventricular enlargement, moderate left atrial enlargement, mild right atrial enlargement, moderate to severe tricuspid regurgitation with a right ventricular systolic pressure of 72 , and moderate mitral regurgitation.  She has an ICD in place for unclear indications.  She has chronic atrial fibrillation.  She is anticoagulated with warfarin.  She is not on rate lowering medication due to end-stage renal disease on low blood pressure.  She has end-stage renal disease and is dialysis dependent.  She has diabetes, a history of hypertension or more problems with low blood pressure recently and hyperlipidemia.  She also reports COPD.    She comes here for a second opinion.  She has had a couple of hospitalizations in the last few months.  She was diagnosed in both cases with heart failure.  She is end-stage renal disease on hemodialysis.  She was given extra dialysis and discharged.  She comes in today for a second opinion because she's been feeling so poorly recently.  She says she takes 2 or 3 steps and has to stop and rest she is so short of breath.  She has noticed some right lower extremity  "swelling.  She denies chest pain.  She does not have orthopnea.  She does not know if she has paroxysmal nocturnal dyspnea because she says she has not slept in 4 days.  She says she just can't sleep at night.  She says her mouth is constantly dry and she eats ice.  She complains of a cough.    Review of Systems   Constitution: Positive for malaise/fatigue. Negative for fever, weight gain and weight loss.   HENT: Negative for ear pain, hearing loss, nosebleeds and sore throat.    Eyes: Negative for double vision, pain, vision loss in left eye and vision loss in right eye.   Cardiovascular:        See history of present illness.   Respiratory: Positive for cough, shortness of breath, sleep disturbances due to breathing and wheezing.    Endocrine: Negative for cold intolerance, heat intolerance and polyuria.   Skin: Negative for itching, poor wound healing and rash.   Musculoskeletal: Negative for joint pain, joint swelling and myalgias.   Gastrointestinal: Negative for abdominal pain, diarrhea, hematochezia, nausea and vomiting.   Genitourinary: Negative for hematuria and hesitancy.   Neurological: Negative for numbness, paresthesias and seizures.   Psychiatric/Behavioral: Negative for depression. The patient is not nervous/anxious.            ECG 12 Lead  Date/Time: 7/5/2017 12:10 PM  Performed by: JEREMIAH YOUNG  Authorized by: JEREMIAH YOUNG   Comparison: compared with previous ECG from 4/29/2017  Comparison to previous ECG: Heart rate is faster  Rhythm: atrial fibrillation  Ectopy: infrequent PVCs  BPM: 112  Conduction: right bundle branch block  Q waves: II, III, aVF, V4, V5 and V6  Clinical impression: abnormal ECG               Objective:     /78 (BP Location: Left arm)  Ht 62.5\" (158.8 cm)  Wt 184 lb (83.5 kg)  BMI 33.12 kg/m2 Body mass index is 33.12 kg/(m^2).     Physical Exam   Constitutional: She appears well-developed.   HENT:   Head: Normocephalic and atraumatic.   Eyes: Conjunctivae and " lids are normal. Pupils are equal, round, and reactive to light. Lids are everted and swept, no foreign bodies found.   Neck: Normal range of motion. No JVD present. Carotid bruit is not present. No tracheal deviation present. No thyroid mass present.   Cardiovascular: An irregularly irregular rhythm present. Tachycardia present.    Murmur heard.  Pulses:       Dorsalis pedis pulses are 2+ on the right side, and 2+ on the left side.   She has significant jugular venous distention   Pulmonary/Chest: Effort normal and breath sounds normal.   Abdominal: Normal appearance and bowel sounds are normal.   Musculoskeletal: Normal range of motion.   Neurological: She is alert. She has normal strength.   Skin: Skin is warm, dry and intact.   Psychiatric: She has a normal mood and affect. Her behavior is normal.   Vitals reviewed.      Lab Review:       Assessment/Plan:       1.  Shortness of breath.  This is likely multifactorial.  I wonder if she is getting enough dialysis or if they're leaving to much fluid.  It seems like she improves with dialysis.  I am going to check a right and left heart catheterization.  I want to make sure she does not have any new coronary disease.  I want to check her right heart pressures and to see if her wedge is high or there is any indication of chronic diastolic heart failure.  I want to see if she responds to adenosine challenge.  I don't think a repeat echo is helpful.  She had one last month.  2.  Atrial fibrillation.  She is tachycardic today but other recent EKGs do not show tachycardia.  She is not on any rate lowering medication because of problems with hypotension.  She is not on dig because of end-stage renal disease.  If she continues to be tachycardic and that seems to be part of the problem, I would consider an AV node ablation.  She does have a device in place and we are checking it today to see what kind it is.  3.  Aortic valve replacement with a tissue valve  4.  Recurrent  episodes of presumed diastolic heart failure.  She does have normal systolic function.  She does have moderate mitral regurgitation.  She is also dialysis-dependent may not be getting enough fluid removed with hemodialysis.  5.  COPD  6.  Sleep apnea  7.  End stage renal disease on hemodialysis  8.  Diabetes  9.  History of hypertension though more hypotension recently.  She is on Midrin.  10.  Hyperlipidemia  11.  Ventricular tachycardia.  She had her device checked today.  It has not been checked for over 2 years.  It is a [employer] device.  In the last 2 years she has had 2 episodes of ventricular tachycardia/ventricular fibrillation.  Both responded to antitachycardia pacing and she did not require shock.    Addendum: Her device shows that she spends greater than 6 hours a day with a heart rate over 139.  Her average heart rate is generally running right at 100 or little above.  I will get the results back on her heart catheterization but will strongly consider and AV node ablation.    Orders Placed This Encounter   Procedures   • ECG 12 Lead     This order was created via procedure documentation      Keisha Nelson   Home Medication Instructions LATASHA:    Printed on:07/05/17 1213   Medication Information                      Acetaminophen 500 MG tablet dispersible  Take 1 tablet by mouth.             albuterol (PROVENTIL) (2.5 MG/3ML) 0.083% nebulizer solution  Take 2.5 mg by nebulization Every 4 (Four) Hours As Needed for wheezing.             atorvastatin (LIPITOR) 20 MG tablet  Take 1 tablet by mouth Daily.             B Complex-C-Folic Acid (NEPHRO-TC RX PO)  Take 1 tablet by mouth.             budesonide-formoterol (SYMBICORT) 160-4.5 MCG/ACT inhaler               busPIRone (BUSPAR) 10 MG tablet  TAKE 1 TABLET BY MOUTH TWICE DAILY             colesevelam (WELCHOL) 625 MG tablet  Take 1,875 mg by mouth 2 (Two) Times a Day With Meals.             cyanocobalamin (VITAMIN B-12) 500 MCG tablet  Take 500 mcg  by mouth.             ergocalciferol (DRISDOL) 12048 UNITS capsule  Take 1 capsule by mouth 1 (One) Time Per Week.             Ferric Citrate 1  MG(FE) tablet  Take 1 tablet by mouth.             gabapentin (NEURONTIN) 100 MG capsule  TK 1 C PO QHS             hydrOXYzine (ATARAX) 25 MG tablet  Take 25 mg by mouth.             loperamide (IMODIUM) 2 MG capsule  Take 2 mg by mouth.             loratadine (CLARITIN) 10 MG tablet  Take 10 mg by mouth.             Melatonin 10 MG tablet  Take 2 tablets by mouth.             midodrine (PROAMATINE) 5 MG tablet  Take 1 tablet by mouth 3 (Three) Times a Day.             ONETOUCH DELICA LANCETS 33G misc  USE TO TEST BG TID             sevelamer (RENVELA) 2.4 G pack packet  Take  by mouth 3 (Three) Times a Day With Meals.             TRESIBA FLEXTOUCH 100 UNIT/ML solution pen-injector injection  Inject 10 Units under the skin Daily.             warfarin (COUMADIN) 5 MG tablet  Take 5 mg by mouth Daily.                        Marli Johnson MD  07/05/17  12:13 PM

## 2017-07-13 PROBLEM — E83.51 HYPOCALCEMIA: Status: ACTIVE | Noted: 2017-01-01

## 2017-07-13 PROBLEM — E87.20 METABOLIC ACIDOSIS: Status: ACTIVE | Noted: 2017-01-01

## 2017-07-13 NOTE — PROCEDURES
Area prepped and draped in sterile fashion.  Ultrasound used to visualize left radial artery.  First stick was able to puncture artery but unable to feed wire.  Second attempt was successful, fed wire, placed catheter and then sutured in place, connected to pressure bag.     EBL: minimal

## 2017-07-13 NOTE — PROGRESS NOTES
[facility] INPATIENT PROGRESS NOTE         [facility] CORONARY CARE    7/13/2017      PATIENT IDENTIFICATION:   Name:  Keisha Nelson      MRN:  [id number]     72 y.o.  female             LOS 1    Reason for visit: Follow-up respiratory failure on ventilator post cardiac arrest      SUBJECTIVE:    Sedated on ventilator this morning upon my visit.  Discussed with nursing staff on rounds.    Objective   OBJECTIVE:    Vital Sign Min/Max for last 24 hours  Temp  Min: 97.4 °F (36.3 °C)  Max: 98.6 °F (37 °C)   BP  Min: 49/22  Max: 108/67   Pulse  Min: 72  Max: 101   Resp  Min: 16  Max: 26   SpO2  Min: 68 %  Max: 100 %   No Data Recorded   Weight  Min: 185 lb 6.5 oz (84.1 kg)  Max: 188 lb 11.4 oz (85.6 kg)       Vent Mode: PC/AC  FiO2 (%):  [30 %-100 %] 30 %  S RR:  [10-18] 10  PEEP/CPAP (cm H2O):  [5 cm H20-10 cm H20] 5 cm H20  MA SUP:  [0 cm H20-14 cm H20] 14 cm H20  MAP (cm H2O):  [9.5-16] 9.5           FiO2 (%): 30 %     Body mass index is 30.46 kg/(m^2).    Intake/Output Summary (Last 24 hours) at 07/13/17 1258  Last data filed at 07/13/17 0400   Gross per 24 hour   Intake             1184 ml   Output                0 ml   Net             1184 ml         Exam:  GEN:  Sedated on vent, appears stated age  EYES:   PERRL, anicteric sclera  ENT:    External ears/nose normal, Orally intubated  NECK:  No adenopathy, midline trachea  LUNGS: Normal chest on inspection, palpation and Diminished bilaterally on auscultation  CV:  Normal S1S2, without murmur  ABD:  Non tender, non distended, no hepatosplenomegaly, +BS  EXT:  No edema, cyanosis or clubbing    Scheduled meds:    albuterol 6 puff Inhalation Q4H - RT   famotidine 20 mg Intravenous Daily   ipratropium 6 puff Inhalation Q4H - RT   piperacillin-tazobactam 3.375 g Intravenous Q12H     IV meds:                        heparin (porcine) 17.5 Units/kg/hr    norepinephrine 0.02-0.3 mcg/kg/min Last Rate: 0.01 mcg/kg/min (07/13/17 8522)   Pharmacy Consult      propofol 5-50 mcg/kg/min Last Rate: 20 mcg/kg/min (07/13/17 0824)   sodium chloride 50 mL/hr Last Rate: 50 mL/hr (07/12/17 1034)     Data Review:    Results from last 7 days  Lab Units 07/13/17  0441 07/12/17  2032 07/10/17  1531   SODIUM mmol/L 140  --  136   POTASSIUM mmol/L 3.8 2.9* 3.3*   CHLORIDE mmol/L 105  --  90*   CO2 mmol/L 14.1*  --  23.5   BUN mg/dL 26*  --  32*   CREATININE mg/dL 5.45*  --  6.08*   GLUCOSE mg/dL 188*  --  218*   CALCIUM mg/dL 7.2*  --  9.3         Estimated Creatinine Clearance: 10.6 mL/min (by C-G formula based on Cr of 5.45).    Results from last 7 days  Lab Units 07/13/17  0441 07/12/17  1228 07/12/17  1203 07/12/17  1138 07/12/17  1134 07/10/17  1531   WBC 10*3/mm3 9.38  --   --   --   --  7.32   HEMOGLOBIN g/dL 10.8*  --   --   --   --  12.4   HEMOGLOBIN, POC g/dL  --  12.6 13.3 12.6 12.9  --    PLATELETS 10*3/mm3 213  --   --   --   --  215       Results from last 7 days  Lab Units 07/12/17  1036 07/12/17  1029 07/10/17  1531   INR  2.0* 2.0* 2.53*           Results from last 7 days  Lab Units 07/13/17  0337 07/12/17  1429   PH, ARTERIAL pH units 7.480* 7.372   PO2 ART mm Hg 99.5 417.5*   PCO2, ARTERIAL mm Hg 23.5* 40.0   HCO3 ART mmol/L 17.5* 23.2             Chest x-ray viewed showing improvement in opacities    Assessment   ASSESSMENT:   Acute cardiopulmonary arrest  Respiratory failure requiring intubation mechanical ventilation  Aspiration pneumonia  Pulmonary vascular congestion  Hypotensive cardiogenic shock on Levophed  Acute on chronic CHF with an ejection fraction of 35%  End-stage renal disease: Follows with Dr. daily  Obstructive sleep apnea  COPD  Chronic atrial fibrillation  History of valvular heart disease with aortic valve replacement      PLAN:  Make appropriate ventilator changes.  Plan sedation vacation spontaneous breathing trial.  Continue antibiotics.  Chest x-ray shows significant improving suggesting that some of this is pulmonary edema but I do think  that she has an aspiration pneumonia component as well.  Still requiring Levophed for blood pressure support.    Discussed with multidisciplinary ICU team on rounds this morning.  Cct: 35 min    Norman Arizmendi MD  Pulmonary and Critical Care Medicine  [facility]  7/13/2017    12:58 PM

## 2017-07-13 NOTE — PLAN OF CARE
Problem: Patient Care Overview (Adult)  Goal: Plan of Care Review  Outcome: Ongoing (interventions implemented as appropriate)  The patient has done well throughout the evening. I was unable to successfully wean the patient off levophed, her blood pressure became too low quickly when off (its currently at 0.02.) She has done well on 30% oxygen on the vent, no issues with saturation. She has required 40 of propofol pretty consistently, when its lowered she quickly becomes asynchronous with the vent. During the shift her potassium came back at 2.9, 40 meq of K was given (per renal) and now its currently 3.8. Slight drop in creatinine from 6 to 5.5. Her HGB dropped from 12 to 10.8. She has remained in afib throughout the evening. No complications with the venous/arterial sheaths. Her blood pressure on the cuff has consistently read different than the a-line.         Goal: Adult Individualization and Mutuality  Outcome: Ongoing (interventions implemented as appropriate)  Goal: Discharge Needs Assessment  Outcome: Ongoing (interventions implemented as appropriate)    Problem: SAFETY - NON-VIOLENT RESTRAINT  Goal: Remains free of injury from restraints (Non-Violent Restraint)  Outcome: Ongoing (interventions implemented as appropriate)  Goal: Free from restraint(s) (Non-Violent Restraint)  Outcome: Ongoing (interventions implemented as appropriate)

## 2017-07-13 NOTE — PROGRESS NOTES
"   LOS: 1 day   Patient Care Team:  Yamilet Zurita MD as PCP - General (Family Medicine)    Chief Complaint/ Reason for encounter: End-stage renal disease, dialysis management        Subjective     History of Present Illness    Subjective:  Symptoms:  Stable.  She reports weakness.  No shortness of breath or chest pain.  (Patient remains intubated and sedated on the ventilator  FiO2 30%, oxygenation 100%  Blood pressure stable, remains on low-dose Levophed  Unable to obtain any history from patient due to sedation  Events noted).    Diet:  NPO.  No nausea.    Activity level: Impaired due to weakness.    Pain:  She reports no pain.          History taken from: Patient and chart    Objective     Vital Signs  Temp:  [97.4 °F (36.3 °C)-98.6 °F (37 °C)] 97.8 °F (36.6 °C)  Heart Rate:  [] 77  Resp:  [16-26] 18  BP: ()/(24-96) 85/24  Arterial Line BP: ()/(36-60) 128/53  FiO2 (%):  [30 %-100 %] 30 %       Wt Readings from Last 1 Encounters:   07/13/17 0400 188 lb 11.4 oz (85.6 kg)   07/12/17 1345 185 lb 6.5 oz (84.1 kg)   07/12/17 1011 181 lb 4 oz (82.2 kg)       Objective:  General Appearance:  Comfortable, in no acute distress, not in pain and ill-appearing (Intubated and sedated on the ventilator).    Vital signs: (most recent): Blood pressure (!) 85/24, pulse 77, temperature 97.8 °F (36.6 °C), temperature source Oral, resp. rate 18, height 66\" (167.6 cm), weight 188 lb 11.4 oz (85.6 kg), SpO2 99 %.  Vital signs are normal.  No fever.    Output: Minimal urine output.    HEENT: Normal HEENT exam.    Lungs:  Normal respiratory rate and normal effort.  She is not in respiratory distress.  There are rhonchi and decreased breath sounds.  No wheezes or rales.    Heart: Normal rate.  Regular rhythm.  S1 normal and S2 normal.  No murmur.   Abdomen: Abdomen is soft and non-distended.  Bowel sounds are normal.   There is no abdominal tenderness.  There is no epigastric area or no suprapubic area tenderness.  " There is no rebound tenderness.     Extremities: Normal range of motion.  There is no deformity or dependent edema.    Pulses: Distal pulses are intact.    Pupils:  Pupils are equal, round, and reactive to light.    Skin:  Warm and dry.  No rash or cyanosis.             Results Review:    Past Medical History: reviewed and updated  Past Medical History:   Diagnosis Date   • Acute diastolic CHF (congestive heart failure)    • Anxiety    • Asthma    • Atrial fibrillation    • COPD (chronic obstructive pulmonary disease)    • Depression    • Diabetes mellitus    • ESRD (end stage renal disease)    • Hyperlipidemia    • Hypertension    • Long term current use of anticoagulant therapy    • Pulmonary hypertension    • Sleep apnea    • Tricuspid regurgitation          Allergies:  No Known Allergies    Intake/Output:     Intake/Output Summary (Last 24 hours) at 07/13/17 0831  Last data filed at 07/13/17 0400   Gross per 24 hour   Intake             1184 ml   Output                0 ml   Net             1184 ml         DATA:  Interval chart, labs and notes reviewed.  The following labs independently reviewed by me  Chest x-ray this morning independently reviewed by me, improving bilateral pulmonary infiltrates consistent with improved pulmonary edema  Discussed with Dr. Ramirez regarding dialysis management/ volume status    Labs:   Recent Results (from the past 24 hour(s))   POC Glucose Fingerstick    Collection Time: 07/12/17 10:26 AM   Result Value Ref Range    Glucose 118 70 - 130 mg/dL   POC Protime / INR    Collection Time: 07/12/17 10:29 AM   Result Value Ref Range    Protime 23.4 (H) 12.8 - 15.2 seconds    INR 2.0 (H) 0.8 - 1.2   POC Protime / INR    Collection Time: 07/12/17 10:36 AM   Result Value Ref Range    Protime 23.4 seconds    INR 2.0 (A) 0.9 - 1.1   POC Panel 9, ISTAT    Collection Time: 07/12/17 11:34 AM   Result Value Ref Range    Hemoglobin 12.9 12.0 - 17.0 g/dL    Hematocrit 38 38 - 51 %    O2  Saturation, Arterial 98 95 - 98 %   POC Panel 9, ISTAT    Collection Time: 07/12/17 11:38 AM   Result Value Ref Range    Hemoglobin 12.6 12.0 - 17.0 g/dL    Hematocrit 37 (L) 38 - 51 %    O2 Saturation, Arterial 60 (L) 95 - 98 %   POC Panel 9, ISTAT    Collection Time: 07/12/17 12:03 PM   Result Value Ref Range    Hemoglobin 13.3 12.0 - 17.0 g/dL    Hematocrit 39 38 - 51 %    O2 Saturation, Arterial 99 (H) 95 - 98 %   POC Panel 9, ISTAT    Collection Time: 07/12/17 12:28 PM   Result Value Ref Range    Hemoglobin 12.6 12.0 - 17.0 g/dL    Hematocrit 37 (L) 38 - 51 %    O2 Saturation, Arterial 99 (H) 95 - 98 %   Adult Transthoracic Echo Limited    Collection Time: 07/12/17  1:05 PM   Result Value Ref Range    TR max erick 383.0 cm/sec    BH CV VAS BP LEFT /96 mmHg    Echo EF Estimated 35 %   POC Glucose Fingerstick    Collection Time: 07/12/17  1:42 PM   Result Value Ref Range    Glucose 134 (H) 70 - 130 mg/dL   Blood Gas, Arterial    Collection Time: 07/12/17  2:29 PM   Result Value Ref Range    Site Arterial Line     Tu's Test N/A     pH, Arterial 7.372 7.350 - 7.450 pH units    pCO2, Arterial 40.0 35.0 - 45.0 mm Hg    pO2, Arterial 417.5 (H) 80.0 - 100.0 mm Hg    HCO3, Arterial 23.2 22.0 - 28.0 mmol/L    Base Excess, Arterial -1.9 (L) 0.0 - 2.0 mmol/L    O2 Saturation Calculated 100.0 (H) 92.0 - 99.0 %    A-a Gradiant 0.6 mmHg    Barometric Pressure for Blood Gas 752.3 mmHg    Modality Adult Vent     FIO2 100 %    Ventilator Mode VC     Set Tidal Volume 500     Set Mech Resp Rate 18     Rate 18 Breaths/minute    PEEP 5    POC Glucose Fingerstick    Collection Time: 07/12/17  4:28 PM   Result Value Ref Range    Glucose 204 (H) 70 - 130 mg/dL   POC Glucose Fingerstick    Collection Time: 07/12/17  7:59 PM   Result Value Ref Range    Glucose 190 (H) 70 - 130 mg/dL   Potassium    Collection Time: 07/12/17  8:32 PM   Result Value Ref Range    Potassium 2.9 (L) 3.5 - 5.2 mmol/L   POC Glucose Fingerstick     Collection Time: 07/13/17 12:10 AM   Result Value Ref Range    Glucose 163 (H) 70 - 130 mg/dL   Blood Gas, Arterial    Collection Time: 07/13/17  3:37 AM   Result Value Ref Range    Site Arterial Line     Tu's Test N/A     pH, Arterial 7.480 (H) 7.350 - 7.450 pH units    pCO2, Arterial 23.5 (L) 35.0 - 45.0 mm Hg    pO2, Arterial 99.5 80.0 - 100.0 mm Hg    HCO3, Arterial 17.5 (L) 22.0 - 28.0 mmol/L    Base Excess, Arterial -4.2 (L) 0.0 - 2.0 mmol/L    O2 Saturation Calculated 98.3 92.0 - 99.0 %    A-a Gradiant 0.5 mmHg    Barometric Pressure for Blood Gas 749.3 mmHg    Modality Adult Vent     FIO2 30 %    Ventilator Mode PC     Set [facility] Resp Rate 18     Rate 18 Breaths/minute    PEEP 5    POC Glucose Fingerstick    Collection Time: 07/13/17  3:49 AM   Result Value Ref Range    Glucose 175 (H) 70 - 130 mg/dL   Basic Metabolic Panel    Collection Time: 07/13/17  4:41 AM   Result Value Ref Range    Glucose 188 (H) 65 - 99 mg/dL    BUN 26 (H) 8 - 23 mg/dL    Creatinine 5.45 (H) 0.57 - 1.00 mg/dL    Sodium 140 136 - 145 mmol/L    Potassium 3.8 3.5 - 5.2 mmol/L    Chloride 105 98 - 107 mmol/L    CO2 14.1 (L) 22.0 - 29.0 mmol/L    Calcium 7.2 (L) 8.6 - 10.5 mg/dL    eGFR  African Amer 9 (L) >60 mL/min/1.73    BUN/Creatinine Ratio 4.8 (L) 7.0 - 25.0    Anion Gap 20.9 mmol/L   CBC (No Diff)    Collection Time: 07/13/17  4:41 AM   Result Value Ref Range    WBC 9.38 4.50 - 10.70 10*3/mm3    RBC 3.52 (L) 3.90 - 5.20 10*6/mm3    Hemoglobin 10.8 (L) 11.9 - 15.5 g/dL    Hematocrit 32.6 (L) 35.6 - 45.5 %    MCV 92.6 80.5 - 98.2 fL    MCH 30.7 26.9 - 32.0 pg    MCHC 33.1 32.4 - 36.3 g/dL    RDW 17.3 (H) 11.7 - 13.0 %    RDW-SD 58.1 (H) 37.0 - 54.0 fl    MPV 10.5 6.0 - 12.0 fL    Platelets 213 140 - 500 10*3/mm3   POC Glucose Fingerstick    Collection Time: 07/13/17  7:30 AM   Result Value Ref Range    Glucose 193 (H) 70 - 130 mg/dL       Radiology:  Imaging Results (last 24 hours)     Procedure Component Value Units Date/Time     XR Chest 1 View [[id number]] Collected:  07/12/17 1329     Updated:  07/12/17 1334    Narrative:       ONE VIEW PORTABLE CHEST AT 12:57 PM     HISTORY: Cardiac arrest during catheter lab procedure.     There is prominent cardiomegaly with a pacemaker in place. This is  combined with considerable vascular congestion showing worsening from  04/29/2017 and consistent with changes of pulmonary edema. There is also  more localized dense consolidation in both upper lobes, right greater  than left and raising the concern of areas of possible aspiration  pneumonia. Continued follow-up evaluation is recommended.     An ET tube ends in good position approximately 3.5 cm above the bev.     This report was finalized on 7/12/2017 1:31 PM by Dr. Coleman Turcios MD.       XR Chest 1 View [[id number]] Collected:  07/12/17 1600     Updated:  07/12/17 1847    Narrative:       PORTABLE VIEW OF THE CHEST 07/12/2017     CLINICAL HISTORY: Pulmonary edema, cardiac arrest.     This is correlated to a portable chest x-ray 2 hours ago 07/12/2017 at  12:55 PM.     FINDINGS: Patient is significantly rotated on this exam which slightly  limits evaluation and comparison to prior nonrotated portable chest 2  hours ago July 12 at 12:55 PM. There is an endotracheal tube in place,  tip projects over the tracheal air column about 3 cm above the bev in  good position. There are multiple sternal wires from previous cardiac  surgery.  Cardiomediastinal silhouette is enlarged. There is extensive  airspace consolidation bilaterally on the left extending from the left  hilar region and left upper lung zone on the right extending from the  right hilar region into the right mid and throughout the right upper  lung zone and to a lesser degree right lower lung zone. Similar pattern  was seen on chest x-ray 2 hours ago and is without significant change.  This may all be alveolar edema although pneumonia of course cannot be  excluded and the findings should  be correlated clinically and with  followup chest x-rays.     This report was finalized on 7/12/2017 6:44 PM by Dr. Rosendo Alas MD.       XR Chest 1 View [[id number]] Collected:  07/13/17 0607     Updated:  07/13/17 0607    Narrative:       PORTABLE CHEST X-RAY     CLINICAL HISTORY: PNA; R06.02-Shortness of breath; I27.2-Other secondary  pulmonary hypertension; I50.32-Chronic diastolic (congestive) heart  failure.     COMPARISON: 07/12/2017     FINDINGS: Portable AP view of the chest was obtained with overlying  monitor leads in place. ET tube and left AICD remain in place. There has  been some improvement in multifocal groundglass opacities bilaterally  favored to be related to improving pulmonary edema rather than  pneumonia. Heart is enlarged. No significant pleural fluid.       Impression:       Improving opacities felt to be related to CHF rather than  pneumonia.                         Medications have been reviewed:  Current Facility-Administered Medications   Medication Dose Route Frequency Provider Last Rate Last Dose   • albuterol (PROVENTIL HFA;VENTOLIN HFA) inhaler 6 puff  6 puff Inhalation Q4H - RT Norman Arizmendi MD   6 puff at 07/13/17 0658   • EPINEPHrine (ADRENALIN) 5 mg in sodium chloride 0.9 % 250 mL (0.02 mg/mL) infusion  0.02 mcg/kg/min Intravenous Titrated Rizwan Saldaña MD   Stopped at 07/12/17 1339   • ipratropium (ATROVENT HFA) inhaler 6 puff  6 puff Inhalation Q4H - RT Norman Arizmendi MD   6 puff at 07/13/17 0658   • norepinephrine (LEVOPHED) 8 mg/250 mL (32 mcg/mL) in sodium chloride 0.9% infusion (premix)  0.02-0.3 mcg/kg/min Intravenous Titrated Rizwan Saldaña MD 3.08 mL/hr at 07/13/17 0250 0.02 mcg/kg/min at 07/13/17 0250   • Pharmacy Consult   Does not apply Continuous PRN Norman Arizmendi MD       • piperacillin-tazobactam (ZOSYN) 3.375 g in iso-osmotic dextrose 50 ml (premix)  3.375 g Intravenous Q12H Norman Arizmendi MD   3.375 g at 07/13/17 0221   • propofol  (DIPRIVAN) infusion 10 mg/mL 100 mL  5-50 mcg/kg/min Intravenous Titrated Norman Arizmendi MD 10.09 mL/hr at 07/13/17 0824 20 mcg/kg/min at 07/13/17 0824   • sodium chloride 0.9 % infusion  50 mL/hr Intravenous Continuous Rizwan Saldaña MD 50 mL/hr at 07/12/17 1034 50 mL/hr at 07/12/17 1034     Facility-Administered Medications Ordered in Other Encounters   Medication Dose Route Frequency Provider Last Rate Last Dose   • Propofol (DIPRIVAN) injection    PRN Francesco Johnson MD   100 mg at 07/12/17 1155   • succinylcholine (ANECTINE) injection   Intravenous PRGHADA Johnson MD   100 mg at 07/12/17 1155       Assessment/Plan     Active Problems:    Shortness of breath    Cardiogenic shock    Acute respiratory failure with hypoxia    Pulmonary edema      Assessment:  (End-stage renal disease.  Status post cardiac arrest  Cardiogenic shock, currently on low-dose Levophed  Acute respiratory failure  Hypokalemia postdialysis, improved after replacement  Hypotension status post code  Worsening metabolic acidosis post code  Congestive heart failure  Type 2 diabetes mellitus  New hypocalcemia- check ionized AM  Pulmonary edema vs. aspiration pneumonia on chest x-ray, pulmonary managing        Plan:   Her electrolytes are stable today but acidosis worse  Volume status acceptable, oxygenation is adequate on only 30% FiO2   hemodialysis today to correct acidosis  Will try to wean off pressors over next 24 hours  mannitol as needed with HD for hypotension).             Continue to monitor renal function, electroytes and volume closely   Please call me with any questions or concerns      Toni Oconnell MD   [facility]   [phone]    07/13/17  8:31 AM      Dictation performed using Dragon dictation software

## 2017-07-13 NOTE — PROGRESS NOTES
Continued Stay Note  [facility]     Patient Name: Keisha Nelson  MRN: [id number]  Today's Date: 7/13/2017    Admit Date: 7/12/2017          Discharge Plan       07/13/17 1343    Case Management/Social Work Plan    Plan CCP to follow for discharge needs.  YEFRI Hayes    Additional Comments FACE SHEET VERIFIED/ IM LETTER SIGNED.   Pt intubated on vent.  Spoke to pt's daughter  (Becka Nelson [phone]) privately  in CCU waiting area .  Pt lives in fourth floor single level apartment with elevator access.  Pt  has a bath bench, grab bar, rollator, and emergency pull cord in bedroom and bathroom.   Pt gets [facility] at [facility] (Daughter) thinks but is not sure.  Pt is not current with HH.  Pt has not been in SNF.   Pt is a HD pt .  Pt goes to [facility] [place] [facility].  CCP to follow for discharge management.   YEFRI Hayes              Discharge Codes     None            Sherly Jerry RN

## 2017-07-13 NOTE — PROGRESS NOTES
"Keisha Nelson  1945 72 y.o.  [id number]      Patient Care Team:  Yamilet Zurita MD as PCP - General (Family Medicine)    CC: Severe cardiomyopathy, severe pulmonary hypertension, end-stage renal failure on hemodialysis cardiac arrest during cardiac catheterization    Interval History: Intubated sedated      Objective   Vital Signs  Temp:  [97.4 °F (36.3 °C)-98.6 °F (37 °C)] 97.8 °F (36.6 °C)  Heart Rate:  [] 75  Resp:  [16-26] 16  BP: ()/(22-75) 97/56  Arterial Line BP: ()/(36-60) 125/56  FiO2 (%):  [30 %-100 %] 30 %    Intake/Output Summary (Last 24 hours) at 07/13/17 1242  Last data filed at 07/13/17 0400   Gross per 24 hour   Intake             1184 ml   Output                0 ml   Net             1184 ml     Flowsheet Rows         First Filed Value    Admission Height  66\" (167.6 cm) Documented at 07/12/2017 1011    Admission Weight  181 lb 4 oz (82.2 kg) Documented at 07/12/2017 1011          Physical Exam:   General Appearance:    Intubated sedated    Lungs:     Clear to auscultation,BS are equal    Heart:    Normal S1 and S2, RRR without murmur, gallop or rub   HEENT:    Sclera are clear, no JVD or adenopathy   Abdomen:     Normal bowel sounds, soft non-tender, non-distended, no HSM   Extremities:   Moves all extremities well, no edema, no cyanosis, no             Redness, no rash     Medication Review:        albuterol 6 puff Inhalation Q4H - RT   famotidine 20 mg Intravenous Daily   ipratropium 6 puff Inhalation Q4H - RT   piperacillin-tazobactam 3.375 g Intravenous Q12H       EPINEPHrine 0.02 mcg/kg/min Last Rate: Stopped (07/12/17 1339)   heparin (porcine) 17.5 Units/kg/hr    norepinephrine 0.02-0.3 mcg/kg/min Last Rate: Stopped (07/13/17 0915)   Pharmacy Consult     propofol 5-50 mcg/kg/min Last Rate: 20 mcg/kg/min (07/13/17 0824)   sodium chloride 50 mL/hr Last Rate: 50 mL/hr (07/12/17 1034)         I reviewed the patient's new clinical results.  I personally viewed and " interpreted the patient's EKG/Telemetry data    Assessment/Plan  Active Hospital Problems (** Indicates Principal Problem)    Diagnosis Date Noted   • Cardiogenic shock [R57.0] 07/12/2017   • Acute respiratory failure with hypoxia [J96.01] 07/12/2017   • Pulmonary edema [J81.1] 07/12/2017   • Shortness of breath [R06.02] 11/14/2012      Resolved Hospital Problems    Diagnosis Date Noted Date Resolved   No resolved problems to display.       She's been stable overnight she chronically low runs low blood pressure.  Yesterday I think what happened is she got sedation has sleep apnea developed hypoxia and hypotension may have aspirated pulmonary edema and respiratory failure had to be intubated she's had hypotension overnight although this morning it's good and if we can wean off that we will.  Overall her prognosis is guarded she has end-stage renal failure she has severe pulmonary hypertension she has obesity she has been hospitalized several times here recently I'm not sure that we can do anything about her severe pulmonary hypertension it's occurring in the setting of an elevated wedge pressure having difficulty getting fluid off because she's hypotensive    Rizwan Saldaña MD  07/13/17  12:42 PM

## 2017-07-13 NOTE — PLAN OF CARE
Problem: Patient Care Overview (Adult)  Goal: Plan of Care Review  Outcome: Ongoing (interventions implemented as appropriate)    07/13/17 6157   Outcome Evaluation   Outcome Summary/Follow up Plan Sheaths pulled today, levophed continues at a low dose. Arterial line inserted left radius

## 2017-07-13 NOTE — PROGRESS NOTES
"Patient Name: Keisha Nelson  :1945  72 y.o.      Patient Care Team:  Yamilet Zurita MD as PCP - General (Family Medicine)    Interval History:   Status post heart catheterization.  Status post arrest requiring intubation and mechanical ventilation.    Subjective:  Following for cardiomyopathy and atrial fibrillation    Objective   Vital Signs  Temp:  [97.4 °F (36.3 °C)-98.6 °F (37 °C)] 97.8 °F (36.6 °C)  Heart Rate:  [] 76  Resp:  [16-26] 18  BP: ()/(24-96) 85/24  Arterial Line BP: ()/(36-60) 129/54  FiO2 (%):  [30 %-100 %] 30 %    Intake/Output Summary (Last 24 hours) at 17 0931  Last data filed at 17 0400   Gross per 24 hour   Intake             1184 ml   Output                0 ml   Net             1184 ml     Flowsheet Rows         First Filed Value    Admission Height  66\" (167.6 cm) Documented at 2017 1011    Admission Weight  181 lb 4 oz (82.2 kg) Documented at 2017 1011          Physical Exam:   General Appearance:   Intubated.  Sedated.     Lungs:    Coarse breath sounds.  Ventilated      Heart:    Regular rhythm and normal rate, normal S1 and S2, no murmurs, gallops or rubs.     Chest Wall:    No abnormalities observed   Abdomen:     Soft, nontender, positive bowel sounds.     Extremities:   no cyanosis, clubbing or edema.        Results Review:      Results from last 7 days  Lab Units 17  0441   SODIUM mmol/L 140   POTASSIUM mmol/L 3.8   CHLORIDE mmol/L 105   CO2 mmol/L 14.1*   BUN mg/dL 26*   CREATININE mg/dL 5.45*   GLUCOSE mg/dL 188*   CALCIUM mg/dL 7.2*           Results from last 7 days  Lab Units 17  0441   WBC 10*3/mm3 9.38   HEMOGLOBIN g/dL 10.8*   HEMATOCRIT % 32.6*   PLATELETS 10*3/mm3 213       Results from last 7 days  Lab Units 17  1036 17  1029 07/10/17  1531   INR  2.0* 2.0* 2.53*                     Medication Review:     albuterol 6 puff Inhalation Q4H - RT   ipratropium 6 puff Inhalation Q4H - RT "   piperacillin-tazobactam 3.375 g Intravenous Q12H          EPINEPHrine 0.02 mcg/kg/min Last Rate: Stopped (07/12/17 1339)   norepinephrine 0.02-0.3 mcg/kg/min Last Rate: Stopped (07/13/17 0915)   Pharmacy Consult     propofol 5-50 mcg/kg/min Last Rate: 20 mcg/kg/min (07/13/17 0824)   sodium chloride 50 mL/hr Last Rate: 50 mL/hr (07/12/17 1034)       Assessment/Plan     1. Shortness of breath/acute respiratory failure. She came to see me for persistent shortness of breath.  I recommended a right and left heart catheterization which was performed yesterday.  She had a wedge pressure of 21 with a pulmonary pressure of 55/24.  She had no significant coronary artery disease.  Transthoracic echocardiogram showed moderately decreased LV function with an ejection fraction of 35% with severe pulmonary hypertension and severe tricuspid regurgitation.  2. Atrial fibrillation.  Pacemaker check in the office shows that she has persistently tachycardic.  She has not been on rate lowering medication because of hypotension.  She may benefit from an AV node ablation.Currently, she is not tachycardic.  3. Aortic valve replacement. Mechanical. Only one leaflet clearly opening on cath. Not well visualized on echo. Start heparin  4.  She has systolic and diastolic heart failure.  Her last echo in April 2017 showed her ejection fraction to be 50%.  It is now down to 35%.  This could be tachycardia mediated  5. COPD  6. Sleep apnea  7. End stage renal disease on hemodialysis  8. Diabetes  9. History of hypertension though more hypotension recently. She is on Midrin as an outpatient.  10. Hyperlipidemia  11. Ventricular tachycardia.  When her pacemaker was checked in my office earlier this month that showed 2 episodes of ventricular tachycardia/ventricular fibrillation with responded to antitachycardia pacing and did not require shock.    Her heart catheterization did not reveal a specific reason for her hypotension, cardiomyopathy and  persistent dyspnea.  She has persistently tachycardic when her pacemaker was interrogated.  Once this acute episode of respiratory failure has resolved, I recommend an AV node ablation.    Marli Johnson MD, [facility]  07/13/17  9:31 AM

## 2017-07-13 NOTE — PROGRESS NOTES
Discharge Planning Assessment  [facility]     Patient Name: Keisha Nelson  MRN: [id number]  Today's Date: 7/13/2017    Admit Date: 7/12/2017          Discharge Needs Assessment       07/13/17 1342    Living Environment    Lives With alone    Living Arrangements apartment    Home Accessibility no concerns    Stair Railings at Home none    Transportation Available car;family or friend will provide    Living Environment    Provides Primary Care For no one    Quality Of Family Relationships supportive    Able to Return to Prior Living Arrangements yes    Living Arrangement Comments Pt lives in a Sr living apartment    Discharge Needs Assessment    Concerns To Be Addressed basic needs concerns    Anticipated Changes Related to Illness none    Equipment Currently Used at Home bath bench;grab bar;rollator    Equipment Needed After Discharge bath bench;grab bar;rollator    Discharge Disposition skilled nursing facility            Discharge Plan       07/13/17 1343    Case Management/Social Work Plan    Plan CCP to follow for discharge needs.  YEFRI Hayes    Additional Comments FACE SHEET VERIFIED/ IM LETTER SIGNED.   Pt intubated on vent.  Spoke to pt's daughter  (Becka Nelson [phone]) privately  in CCU waiting area .  Pt lives in fourth floor single level apartment with elevator access.  Pt  has a bath bench, grab bar, rollator, and emergency pull cord in bedroom and bathroom.   Pt gets prescriptions at [facility] (Daughter) thinks but is not sure.  Pt is not current with HH.  Pt has not been in SNF.   Pt is a HD pt .  Pt goes to [facility] at [address]  CCP to follow for discharge management.   Freddy RN        Discharge Placement     No information found                Demographic Summary       07/13/17 1331    Referral Information    Admission Type inpatient    Arrived From home or self-care    Contact Information    Permission Granted to Share Information With family/designee    Comments  Becka Nelson  DAUGHTER ([phone])    Primary Care Physician Information    Name Dr. Yamilet Zurita    Phone [phone]            Functional Status       07/13/17 1341    Functional Status Current    Ambulation 4-->completely dependent    Transferring 4-->completely dependent    Toileting 4-->completely dependent    Bathing 4-->completely dependent    Dressing 4-->completely dependent    Eating 4-->completely dependent    Communication 3-->unable to understand or speak (not related to language barrier)            Psychosocial     None            Abuse/Neglect     None            Legal     None            Substance Abuse     None            Patient Forms     None          Sherly Jerry, RN

## 2017-07-14 NOTE — PLAN OF CARE
Problem: Patient Care Overview (Adult)  Goal: Plan of Care Review    07/14/17 1631   Coping/Psychosocial Response Interventions   Plan Of Care Reviewed With patient   Outcome Evaluation   Outcome Summary/Follow up Plan cathy [email or web address]

## 2017-07-14 NOTE — PROGRESS NOTES
"Keisha Nelson  1945 72 y.o.  [id number]      Patient Care Team:  Yamilet Zurita MD as PCP - General (Family Medicine)    CC: Severe cardiomyopathy, severe pulmonary hypertension, end-stage renal failure on hemodialysis cardiac arrest during cardiac catheterization    Interval History: She self extubated last night and looks much improved today      Objective   Vital Signs  Temp:  [97.5 °F (36.4 °C)-98.1 °F (36.7 °C)] 97.7 °F (36.5 °C)  Heart Rate:  [] 98  Resp:  [14-18] 16  BP: ()/(22-91) 60/36  Arterial Line BP: ()/(37-65) 114/49  FiO2 (%):  [30 %] 30 %    Intake/Output Summary (Last 24 hours) at 07/14/17 0855  Last data filed at 07/14/17 0550   Gross per 24 hour   Intake              950 ml   Output                0 ml   Net              950 ml     Flowsheet Rows         First Filed Value    Admission Height  66\" (167.6 cm) Documented at 07/12/2017 1011    Admission Weight  181 lb 4 oz (82.2 kg) Documented at 07/12/2017 1011          Physical Exam:   General Appearance:    Intubated sedated    Lungs:     Clear to auscultation,BS are equal    Heart:    Normal S1 and S2, RRR without murmur, gallop or rub   HEENT:    Sclera are clear, no JVD or adenopathy   Abdomen:     Normal bowel sounds, soft non-tender, non-distended, no HSM   Extremities:   Moves all extremities well, no edema, no cyanosis, no             Redness, no rash     Medication Review:        famotidine 20 mg Intravenous Daily   piperacillin-tazobactam 3.375 g Intravenous Q12H       heparin (porcine) 17.5 Units/kg/hr    norepinephrine 0.02-0.3 mcg/kg/min Last Rate: 0.04 mcg/kg/min (07/14/17 0550)   Pharmacy Consult     propofol 5-50 mcg/kg/min Last Rate: 30 mcg/kg/min (07/13/17 1453)   sodium chloride 50 mL/hr Last Rate: Stopped (07/13/17 1400)         I reviewed the patient's new clinical results.  I personally viewed and interpreted the patient's EKG/Telemetry data    Assessment/Plan  Active Hospital Problems (** Indicates " Principal Problem)    Diagnosis Date Noted   • Hypocalcemia [E83.51] 07/13/2017   • Metabolic acidosis [E87.2] 07/13/2017   • Cardiogenic shock [R57.0] 07/12/2017   • Acute respiratory failure with hypoxia [J96.01] 07/12/2017   • Pulmonary edema [J81.1] 07/12/2017   • Shortness of breath [R06.02] 11/14/2012      Resolved Hospital Problems    Diagnosis Date Noted Date Resolved   No resolved problems to display.       This is a lady really in a tough situation she has end-stage renal disease relative hypotension pulmonary hypertension severely reduced LV function that is nonischemic with an EF of 30-35% her heart catheter was complicated by aspiration and respiratory arrest she is now extubated.  There is really little that we have to offer her from a cardiac standpoint because her hypotension is so profound there are no medications that we have to give her.  We may be reaching a point where she just cannot be dialyzed anymore because of hypotension.  Sadly we don't have much else to offer    Rizwan Saldaña MD  07/14/17  8:55 AM

## 2017-07-14 NOTE — PROGRESS NOTES
[facility] INPATIENT PROGRESS NOTE         [facility] CORONARY CARE    7/14/2017      PATIENT IDENTIFICATION:   Name:  Keisha Nelson      MRN:  [id number]     72 y.o.  female             LOS 2    Reason for visit: Follow-up respiratory failure, asp pna,  post cardiac arrest      SUBJECTIVE:    Off vent.  Mild cough. On levophed drip still.  Chart reviewed and events noted.  Discussed with nursing staff on rounds.    Objective   OBJECTIVE:    Vital Sign Min/Max for last 24 hours  Temp  Min: 97.5 °F (36.4 °C)  Max: 98.1 °F (36.7 °C)   BP  Min: 43/23  Max: 115/75   Pulse  Min: 65  Max: 111   Resp  Min: 14  Max: 18   SpO2  Min: 65 %  Max: 100 %   Flow (L/min)  Min: 2  Max: 5   Weight  Min: 187 lb 2.7 oz (84.9 kg)  Max: 187 lb 2.7 oz (84.9 kg)       Vent Mode: PC/AC  FiO2 (%):  [30 %] 30 %  S RR:  [10-18] 10  PEEP/CPAP (cm H2O):  [5 cm H20] 5 cm H20  MI SUP:  [0 cm H20-14 cm H20] 0 cm H20  MAP (cm H2O):  [9.5-12] 11           FiO2 (%): 30 %     Body mass index is 30.21 kg/(m^2).    Intake/Output Summary (Last 24 hours) at 07/14/17 0957  Last data filed at 07/14/17 0550   Gross per 24 hour   Intake              950 ml   Output                0 ml   Net              950 ml         Exam:  GEN:  No distress acutely, appears stated age  EYES:   PERRL, anicteric sclera  ENT:    External ears/nose normal, OP clear  NECK:  No adenopathy, midline trachea  LUNGS: Normal chest on inspection, palpation and Diminished bilaterally on auscultation  CV:  Normal S1S2, without murmur  ABD:  Non tender, non distended, no hepatosplenomegaly, +BS  EXT:  No edema, cyanosis or clubbing    Scheduled meds:      famotidine 20 mg Intravenous Daily   piperacillin-tazobactam 3.375 g Intravenous Q12H     IV meds:                          heparin (porcine) 17.5 Units/kg/hr    norepinephrine 0.02-0.3 mcg/kg/min Last Rate: 0.04 mcg/kg/min (07/14/17 0550)   Pharmacy Consult     propofol 5-50 mcg/kg/min Last Rate: 30 mcg/kg/min (07/13/17  2433)   sodium chloride 50 mL/hr Last Rate: Stopped (07/13/17 1400)     Data Review:    Results from last 7 days  Lab Units 07/13/17  0441 07/12/17  2032 07/10/17  1531   SODIUM mmol/L 140  --  136   POTASSIUM mmol/L 3.8 2.9* 3.3*   CHLORIDE mmol/L 105  --  90*   CO2 mmol/L 14.1*  --  23.5   BUN mg/dL 26*  --  32*   CREATININE mg/dL 5.45*  --  6.08*   GLUCOSE mg/dL 188*  --  218*   CALCIUM mg/dL 7.2*  --  9.3         Estimated Creatinine Clearance: 10.5 mL/min (by C-G formula based on Cr of 5.45).    Results from last 7 days  Lab Units 07/14/17  0543 07/13/17  0441 07/12/17  1228 07/12/17  1203 07/12/17  1138  07/10/17  1531   WBC 10*3/mm3 12.39* 9.38  --   --   --   --  7.32   HEMOGLOBIN g/dL 11.4* 10.8*  --   --   --   --  12.4   HEMOGLOBIN, POC g/dL  --   --  12.6 13.3 12.6  < >  --    PLATELETS 10*3/mm3 242 213  --   --   --   --  215   < > = values in this interval not displayed.    Results from last 7 days  Lab Units 07/14/17  0543 07/13/17  1243 07/12/17  1036 07/12/17  1029 07/10/17  1531   INR  2.86* 2.71* 2.0* 2.0* 2.53*           Results from last 7 days  Lab Units 07/13/17  0337 07/12/17  1429   PH, ARTERIAL pH units 7.480* 7.372   PO2 ART mm Hg 99.5 417.5*   PCO2, ARTERIAL mm Hg 23.5* 40.0   HCO3 ART mmol/L 17.5* 23.2           Micro reviewed      Respiratory Culture [[id number]] Collected: 07/12/17 1448       Lab Status: Preliminary result Specimen: Sputum from ET Suction Updated: 07/13/17 0927        Respiratory Culture Light growth (2+) Normal Respiratory Deja        Gram Stain Result Few (2+) WBCs seen         Rare (1+) Epithelial cells per low power field         Mixed bacterial morphotypes seen on Gram Stain          Assessment   ASSESSMENT:   Acute cardiopulmonary arrest  Respiratory failure requiring intubation mechanical ventilation  Aspiration pneumonia  Pulmonary vascular congestion  Hypotensive cardiogenic shock on Levophed  Acute on chronic CHF with an ejection fraction of 35%  End-stage  renal disease: Follows with Dr. daily  Obstructive sleep apnea  COPD  Chronic atrial fibrillation  History of valvular heart disease with aortic valve replacement  Metabolic acidosis     PLAN:  Continue antibiotics.  Chest x-ray shows significant improving suggesting that some of this is pulmonary edema but I do think that she has an aspiration pneumonia component as well.   Still requiring Levophed for blood pressure support.  Repeat CXR in am    Discussed with multidisciplinary ICU team on rounds this morning.      Norman Arizmendi MD  Pulmonary and Critical Care Medicine  [facility], [facility]  7/14/2017    9:57 AM

## 2017-07-14 NOTE — THERAPY EVALUATION
Acute Care - Speech Language Pathology   Swallow Initial Evaluation [facility]     Patient Name: Keisha Nelson  : 1945  MRN: [id number]  Today's Date: 2017               Admit Date: 2017    SPEECH-LANGUAGE PATHOLOGY EVALUATION - SWALLOW  Subjective: The patient was seen on this date for a Clinical Swallow evaluation.  Patient was alert and cooperative.    The patient's history is significant for respiratory failure, underwent right and left heart catheterization  and suffered cardiac arrest and intubated from  to  with self extubation. History of a-fib, COPD, CHF, end stage renal disease.    Objective: Textures given included thin liquid, puree consistency, mechanical soft consistency and regular consistency.  Assessment: Difficulties were noted with none of the above consistencies, characterized by no overt s/s of pen/asp with timely swallow initiation functional mastication and adequate laryngeal elevation upon neck palpation.  SLP Findings:  Patient presents with functional swallow, without esophageal component.   Recommendations: Diet Textures: thin liquid, regular consistency food.  Medications should be taken whole with thin liquids.   Recommended Strategies: Upright for PO and small bites and sips. Oral care before breakfast, after all meals and PRN.  Dysphagia therapy is not recommended. Rationale: swallow WNL  Visit Dx:     ICD-10-CM ICD-9-CM   1. Shortness of breath R06.02 786.05   2. Pulmonary hypertension I27.2 416.8   3. Chronic diastolic congestive heart failure I50.32 428.32     428.0     Patient Active Problem List   Diagnosis   • Benign essential hypertension   • Congestive heart failure   • Chronic kidney disease, stage IV (severe)   • Chronic obstructive pulmonary disease   • Primary hypertrophic cardiomyopathy   • History of aortic valve replacement   • Imbalance   • End stage renal failure on dialysis   • Type 2 diabetes mellitus   • Chronic a-fib   • Dizziness   •  Bleeding internal hemorrhoids   • Hematochezia   • Systemic infection   • Calciphylaxis   • Cellulitis of lower extremity   • Centrilobular emphysema   • Chronic diastolic heart failure   • Chronic obstructive bronchitis   • Multiple-type hyperlipidemia   • Dependence on renal dialysis   • Depression   • Diabetic hypoglycemia   • Diabetic peripheral neuropathy   • Edema   • History of prosthetic heart valve   • Long term current use of anticoagulant   • Long term current use of insulin   • Major depressive disorder with single episode   • Nonsustained ventricular tachycardia   • Obstructive sleep apnea syndrome   • Periumbilic abdominal tenderness   • Pulmonary hypertension   • Automatic implantable cardioverter-defibrillator in situ   • Skin ulcer   • Shortness of breath   • Chronic fatigue   • Cardiogenic shock   • Acute respiratory failure with hypoxia   • Pulmonary edema   • Hypocalcemia   • Metabolic acidosis     Past Medical History:   Diagnosis Date   • Acute diastolic CHF (congestive heart failure)    • Anxiety    • Asthma    • Atrial fibrillation    • COPD (chronic obstructive pulmonary disease)    • Depression    • Diabetes mellitus    • ESRD (end stage renal disease)    • Hyperlipidemia    • Hypertension    • Long term current use of anticoagulant therapy    • Pulmonary hypertension    • Sleep apnea    • Tricuspid regurgitation      Past Surgical History:   Procedure Laterality Date   • AORTIC VALVE REPAIR/REPLACEMENT     • CARDIAC CATHETERIZATION N/A 7/12/2017    Procedure: Right and Left Heart Cath;  Surgeon: Rizwan Saldaña MD;  Location: [facility] CATH INVASIVE LOCATION;  Service:    • CARDIAC CATHETERIZATION N/A 7/12/2017    Procedure: Left ventriculography;  Surgeon: Rizwan Saldaña MD;  Location[facility] CATH [facility]VASIVE [facility];  Service:    • CARDIAC CATHETERIZATION N/A 7/12/2017    Procedure: Aortic root aortogram;  Surgeon: Rizwan Saldaña MD;  Location: [facility] CATH INVASIVE LOCATION;  Service:    •  CARDIAC DEFIBRILLATOR PLACEMENT Left    • DIALYSIS FISTULA CREATION Right    • LAPAROSCOPIC CHOLECYSTECTOMY     • PARTIAL HYSTERECTOMY            SWALLOW EVALUATION (last 72 hours)      Swallow Evaluation       07/14/17 1600                Rehab Evaluation    Document Type evaluation  -KA        Subjective Information no complaints  -KA        Patient Effort, Rehab Treatment excellent  -KA        Symptoms Noted During/After Treatment none  -KA        General Information    Patient Profile Review yes  -KA        Current Diet Limitations NPO  -KA        Prior Level of Function- Communication functional in all spheres  -KA        Prior Level of Function- Swallowing no diet consistency restrictions;safe, efficient swallowing in all situations  -KA        Plans/Goals Discussed With patient  -KA        Barriers to Rehab none identified  -KA        Clinical Impression    Patient's Goals For Discharge return home  -KA        SLP Swallowing Diagnosis other (see comments)   WNL  -KA        Criteria for Skilled Therapeutic Interventions Met no problems identified which require skilled intervention  -KA        SLP Diet Recommendation regular textures;thin liquids  -KA        Recommended Feeding/Eating Techniques small sips/bites  -KA        SLP Rec. for Method of Medication Administration meds whole with thin liquid  -KA        Monitor For Signs Of Aspiration pneumonia;right lower lobe infiltrates  -KA        Anticipated Discharge Disposition other (see comments)   unknown   -KA        Oral Motor Structure and Function    Oral Motor Anatomy and Physiology patient demonstrates anatomy and physiology that is WNL  -KA        Dentition Assessment present and adequate  -KA        Secretion Management WNL/WFL  -KA        Volitional Swallow no difficulties initiating volitional swallow  -KA        Volitional Cough no difficulties initiating volitional cough  -KA        Oral Musculature General Assessment WNL (within normal limits)   -          User Key  (r) = Recorded By, (t) = Taken By, (c) = Cosigned By    Initials Name Effective Dates    KENDRA Serna MA,CCC-SLP 04/13/15 -         EDUCATION  The patient has been educated in the following areas:   Dysphagia (Swallowing Impairment).    SLP Recommendation and Plan  SLP Swallowing Diagnosis: other (see comments) (WNL)  SLP Diet Recommendation: regular textures, thin liquids  Recommended Feeding/Eating Techniques: small sips/bites  SLP Rec. for Method of Medication Administration: meds whole with thin liquid  Monitor For Signs Of Aspiration: pneumonia, right lower lobe infiltrates     Criteria for Skilled Therapeutic Interventions Met: no problems identified which require skilled intervention  Anticipated Discharge Disposition: other (see comments) (unknown )                               SLP Outcome Measures (last 72 hours)      SLP Outcome Measures       07/14/17 1600          SLP Outcome Measures    Outcome Measure Used? Adult NOMS  -      FCM Scores    FCM Chosen Swallowing  -      Swallowing FCM Score 7  -KA        User Key  (r) = Recorded By, (t) = Taken By, (c) = Cosigned By    Initials Name Effective Dates    KENDRA Serna MA,CCC-SLP 04/13/15 -            Time Calculation:         Time Calculation- SLP       07/14/17 1618          Time Calculation- [facility]    SLP Start Time 1530  -      SLP Stop Time 1615  -      SLP Time Calculation (min) 45 min  -      SLP Received On 07/14/17  -        User Key  (r) = Recorded By, (t) = Taken By, (c) = Cosigned By    Initials Name Provider Type    KENDRA Serna MA,CCC-SLP Speech and Language Pathologist          Therapy Charges for Today     Code Description Service Date Service Provider Modifiers Qty    [id number]  ST EVAL ORAL PHARYNG SWALLOW 3 7/14/2017 Peterson eSrna MA,CCC-SLP GN 1               Peterson Serna MA,SHARON-SLP  7/14/2017

## 2017-07-14 NOTE — POST-PROCEDURE NOTE
Procedure left femoral central venous catheter.  Indication patient in shock requiring fairly high dose norepinephrine she lost her previous central venous access and needed central venous access.  In addition to vasopressors she is getting other medicines potassium in the single peripheral IV is burning.  Consent: Risks benefits and options discussed with patient informed consent obtained  Procedure I ultrasounded a seizure prior to start of the procedure the right IJ with fairly large and compressible the left IJ was not quite as large and was not compressible she is clearly had multiple dialysis catheters in both.  I could not identify the left subclavian the right subclavian was fairly large.  I ChloraPrep the patient times to maximal barrier precautions with ultrasound guidance the right IJ was easily cannulated I had excellent blood flow but I could not get the guidewire passed about 24 cm from the back of some about 4 cm out of the needle IA repositioned her head and shoulders several times and continued to meet resistance spike continued excellent blood return.  Therefore I went to the right subclavian discussed with the patient that there is a risk that we could meet resistance there as well I ChloraPrep times to maximal barrier precautions again and all the procedure I used lidocaine about 3 cc subcutaneously at the procedure site the subclavian vein was cannulated and again I met resistance and could not get a guidewire beyond about 26 cm with a very distal right subclavian stick.  I think she must have some level of stenosis there from her prior lines.  She had a right femoral groin catheter in place that was removed earlier today therefore I went to the left femoral site she was ChloraPrep times to maximal barrier precautions with a new kit line with ultrasound guidance the left femoral vein was cannulated using modified Seldinger technique a narrow quad lumen catheter was placed to 16 cm insertion  length and secured with 2 2-0 silk sutures Biopatch dressing applied all ports aspirated and flushed easily there was no complication.

## 2017-07-14 NOTE — NURSING NOTE
Arrived to bedside,  and ready to go, access assessed with bruising noted to distal portion of access, faint and weak bruit and thrill, 2- 15gu fistula needles were used to cannulate the access, sluggish aspiration, easy to flush, no development of hematoma at either site.  Connected to lines and blood pump turned on, blood was present to blood pump and to beginning of dialyzer, but no additional blood volume would enter the circuit.  Applied syringe to arterial site and was able to aspirate slowly, attempted dialysis again with the same results.  Patient then moved and dislodged arterial site, when going to flush venous access, unable to aspirate or flush.  Both needles were removed with no bleeding from sites. New lines and dialyzer was placed, attempted to access site again but with 17 gauge needles with no success.  Page to Dr. JAKE Oconnell to inform that dialysis was not able to be preformed tonight.

## 2017-07-14 NOTE — PLAN OF CARE
Problem: Patient Care Overview (Adult)  Goal: Plan of Care Review  Patient has remained stable this shift, continues to have frequent bowel movements.  Still awaiting follow up from [facility] in ref to Heparin gtt.  Renetta Gallo MSn, RN

## 2017-07-14 NOTE — PLAN OF CARE
Problem: Patient Care Overview (Adult)  Goal: Plan of Care Review  Outcome: Ongoing (interventions implemented as appropriate)  The patient has done well throughout the shift. During shift change she managed to self extubate, though she has done completely on nasal cannula. I contacted Dr. Benjamin about the heparin drip ordered by Dr. Johnson, he informed me to check the INR and if its below 2 start the heparin drip ( its above 2.) Dr. Saldaña came by in the AM and I informed him of the situation. Through the evening she infiltrated her IV with levophed running, a central line was placed femoral and levophed is now running through that. She had dialysis scheduled last night, but the nurse was unable to dialyze her. Nephrology was informed and will attempt dialysis later today. Her morning labs look unremarkable.    07/14/17 0650   Coping/Psychosocial Response Interventions   Plan Of Care Reviewed With patient   Patient Care Overview   Progress progress toward functional goals as expected       Goal: Adult Individualization and Mutuality  Outcome: Ongoing (interventions implemented as appropriate)    Problem: SAFETY - NON-VIOLENT RESTRAINT  Goal: Remains free of injury from restraints (Non-Violent Restraint)  Outcome: Outcome(s) achieved Date Met:  07/14/17  Goal: Free from restraint(s) (Non-Violent Restraint)  Outcome: Outcome(s) achieved Date Met:  07/14/17

## 2017-07-14 NOTE — NURSING NOTE
There is an active heparin drip in the mar. At 1200 Dr. Johnson mentioned in her notes to begin a heparin drip on the patient, at 1700 a note was left by Rosendo Johnson indicating that the heparin drip was DC'ed and wouldn't be given. There was no heparin running when I arrived at 1900 today. A call was placed and Dr. Lofton was informed of the situation, he instructed not to start the heparin because her INR was above 2. He ordered a morning INR to be drawn and if her INR was below 2 then a heparin drip would be started without an initial bolus.

## 2017-07-14 NOTE — PROGRESS NOTES
"   LOS: 2 days   Patient Care Team:  Yamilet Zurita MD as PCP - General (Family Medicine)    Chief Complaint/ Reason for encounter: End-stage renal disease, dialysis management        Subjective     History of Present Illness    Subjective:  Symptoms:  Stable.  She reports weakness.  No shortness of breath or chest pain.  (Self extubated this morning  Problems accessing her AV fistula with dialysis yesterday and dialysis was unable to be completed).    Diet:  NPO.  No nausea.    Activity level: Impaired due to weakness.    Pain:  She reports no pain.          History taken from: Patient and chart    Objective     Vital Signs  Temp:  [97.5 °F (36.4 °C)-98.1 °F (36.7 °C)] 97.7 °F (36.5 °C)  Heart Rate:  [] 100  Resp:  [14-16] 16  BP: ()/(23-91) 60/36  Arterial Line BP: ()/(41-71) 116/54  FiO2 (%):  [30 %] 30 %       Wt Readings from Last 1 Encounters:   07/14/17 0500 187 lb 2.7 oz (84.9 kg)   07/13/17 0400 188 lb 11.4 oz (85.6 kg)   07/12/17 1345 185 lb 6.5 oz (84.1 kg)   07/12/17 1011 181 lb 4 oz (82.2 kg)       Objective:  General Appearance:  Comfortable, in no acute distress, not in pain and ill-appearing (Intubated and sedated on the ventilator).    Vital signs: (most recent): Blood pressure (!) 60/36, pulse 100, temperature 97.7 °F (36.5 °C), temperature source Oral, resp. rate 16, height 66\" (167.6 cm), weight 187 lb 2.7 oz (84.9 kg), SpO2 96 %.  Vital signs are normal.  No fever.    Output: Minimal urine output.    HEENT: Normal HEENT exam.    Lungs:  Normal respiratory rate and normal effort.  She is not in respiratory distress.  There are rhonchi and decreased breath sounds.  No wheezes or rales.    Heart: Normal rate.  Regular rhythm.  S1 normal and S2 normal.  No murmur.   Abdomen: Abdomen is soft and non-distended.  Bowel sounds are normal.   There is no abdominal tenderness.  There is no epigastric area or no suprapubic area tenderness.  There is no rebound tenderness.   "   Extremities: Normal range of motion.  There is no deformity or dependent edema.    Pulses: Distal pulses are intact.    Pupils:  Pupils are equal, round, and reactive to light.    Skin:  Warm and dry.  No rash or cyanosis.             Results Review:    Past Medical History: reviewed and updated  Past Medical History:   Diagnosis Date   • Acute diastolic CHF (congestive heart failure)    • Anxiety    • Asthma    • Atrial fibrillation    • COPD (chronic obstructive pulmonary disease)    • Depression    • Diabetes mellitus    • ESRD (end stage renal disease)    • Hyperlipidemia    • Hypertension    • Long term current use of anticoagulant therapy    • Pulmonary hypertension    • Sleep apnea    • Tricuspid regurgitation          Allergies:  No Known Allergies    Intake/Output:     Intake/Output Summary (Last 24 hours) at 07/14/17 1353  Last data filed at 07/14/17 0550   Gross per 24 hour   Intake              950 ml   Output                0 ml   Net              950 ml         DATA:  Interval chart, labs and notes reviewed.  The following labs independently reviewed by me  Chest x-ray this morning independently reviewed by me, improving bilateral pulmonary infiltrates consistent with improved pulmonary edema  Discussed with Dr. Ramirez regarding dialysis management/ volume status    Labs:   Recent Results (from the past 24 hour(s))   POC Glucose Fingerstick    Collection Time: 07/13/17  3:54 PM   Result Value Ref Range    Glucose 167 (H) 70 - 130 mg/dL   POC Glucose Fingerstick    Collection Time: 07/13/17  7:42 PM   Result Value Ref Range    Glucose 196 (H) 70 - 130 mg/dL   POC Glucose Fingerstick    Collection Time: 07/13/17 11:33 PM   Result Value Ref Range    Glucose 170 (H) 70 - 130 mg/dL   POC Glucose Fingerstick    Collection Time: 07/14/17  5:42 AM   Result Value Ref Range    Glucose 194 (H) 70 - 130 mg/dL   Protime-INR    Collection Time: 07/14/17  5:43 AM   Result Value Ref Range    Protime 29.1 (H) 11.7 -  14.2 Seconds    INR 2.86 (H) 0.90 - 1.10   Hepatitis B Surface Antigen    Collection Time: 07/14/17  5:43 AM   Result Value Ref Range    Hepatitis B Surface Ag Non-Reactive Non-Reactive   aPTT    Collection Time: 07/14/17  5:43 AM   Result Value Ref Range    PTT 38.6 (H) 22.7 - 35.4 seconds   CBC Auto Differential    Collection Time: 07/14/17  5:43 AM   Result Value Ref Range    WBC 12.39 (H) 4.50 - 10.70 10*3/mm3    RBC 3.83 (L) 3.90 - 5.20 10*6/mm3    Hemoglobin 11.4 (L) 11.9 - 15.5 g/dL    Hematocrit 35.2 (L) 35.6 - 45.5 %    MCV 91.9 80.5 - 98.2 fL    MCH 29.8 26.9 - 32.0 pg    MCHC 32.4 32.4 - 36.3 g/dL    RDW 17.6 (H) 11.7 - 13.0 %    RDW-SD 57.5 (H) 37.0 - 54.0 fl    MPV 10.8 6.0 - 12.0 fL    Platelets 242 140 - 500 10*3/mm3    Neutrophil % 80.6 (H) 42.7 - 76.0 %    Lymphocyte % 9.2 (L) 19.6 - 45.3 %    Monocyte % 8.6 5.0 - 12.0 %    Eosinophil % 0.1 (L) 0.3 - 6.2 %    Basophil % 0.1 0.0 - 1.5 %    Immature Grans % 1.4 (H) 0.0 - 0.5 %    Neutrophils, Absolute 10.00 (H) 1.90 - 8.10 10*3/mm3    Lymphocytes, Absolute 1.14 0.90 - 4.80 10*3/mm3    Monocytes, Absolute 1.06 0.20 - 1.20 10*3/mm3    Eosinophils, Absolute 0.01 0.00 - 0.70 10*3/mm3    Basophils, Absolute 0.01 0.00 - 0.20 10*3/mm3    Immature Grans, Absolute 0.17 (H) 0.00 - 0.03 10*3/mm3    nRBC 2.0 (H) 0.0 - 0.0 /100 WBC   Calcium, Ionized    Collection Time: 07/14/17  5:43 AM   Result Value Ref Range    Ionized Calcium 1.09 (L) 1.10 - 1.35 mmol/L    Ionized Calcium 4.4 (L) 4.6 - 5.4 mg/dL   POC Glucose Fingerstick    Collection Time: 07/14/17  7:16 AM   Result Value Ref Range    Glucose 175 (H) 70 - 130 mg/dL   POC Glucose Fingerstick    Collection Time: 07/14/17 11:37 AM   Result Value Ref Range    Glucose 149 (H) 70 - 130 mg/dL   Renal Function Panel    Collection Time: 07/14/17 11:58 AM   Result Value Ref Range    Glucose 166 (H) 65 - 99 mg/dL    BUN 48 (H) 8 - 23 mg/dL    Creatinine 8.00 (H) 0.57 - 1.00 mg/dL    Sodium 137 136 - 145 mmol/L     Potassium 4.2 3.5 - 5.2 mmol/L    Chloride 95 (L) 98 - 107 mmol/L    CO2 16.4 (L) 22.0 - 29.0 mmol/L    Calcium 8.7 8.6 - 10.5 mg/dL    Albumin 3.30 (L) 3.50 - 5.20 g/dL    Phosphorus 9.4 (H) 2.5 - 4.5 mg/dL    Anion Gap 25.6 mmol/L    BUN/Creatinine Ratio 6.0 (L) 7.0 - 25.0    eGFR  African Amer 6 (L) >60 mL/min/1.73       Radiology:  Imaging Results (last 24 hours)     Procedure Component Value Units Date/Time    XR Chest 1 View [[id number]] Collected:  07/13/17 0607     Updated:  07/14/17 0555    Narrative:       PORTABLE CHEST X-RAY     CLINICAL HISTORY: PNA; R06.02-Shortness of breath; I27.2-Other secondary  pulmonary hypertension; I50.32-Chronic diastolic (congestive) heart  failure.     COMPARISON: 07/12/2017     FINDINGS: Portable AP view of the chest was obtained with overlying  monitor leads in place. ET tube and left AICD remain in place. There has  been some improvement in multifocal groundglass opacities bilaterally  favored to be related to improving pulmonary edema rather than  pneumonia. Heart is enlarged. No significant pleural fluid.       Impression:       Improving opacities felt to be related to CHF rather than  pneumonia.        This report was finalized on 7/14/2017 5:52 AM by Destin Lee MD.                Medications have been reviewed:  Current Facility-Administered Medications   Medication Dose Route Frequency Provider Last Rate Last Dose   • acetaminophen (TYLENOL) tablet 650 mg  650 mg Oral Q6H PRN Feliberto Kinsey MD       • famotidine (PEPCID) injection 20 mg  20 mg Intravenous Daily Norman Arizmendi MD   20 mg at 07/14/17 0814   • fentaNYL citrate (PF) (SUBLIMAZE) injection 25 mcg  25 mcg Intravenous Q2H PRN Feliberto Kinsey MD   25 mcg at 07/14/17 1134   • Glycerin-Hypromellose- (ARTIFICIAL TEARS) 0.2-0.2-1 % ophthalmic solution solution 2 drop  2 drop Both Eyes Q3H PRN Norman Arizmendi MD       • heparin (porcine) 5000 UNIT/ML injection 3,400-6,800 Units  40-80  Units/kg Intravenous Q6H PRN Marli Johnson MD       • heparin 54080 units/250 mL (100 units/mL) in 0.45 % NaCl infusion  17.5 Units/kg/hr Intravenous Titrated Marli Johnson MD       • midodrine (PROAMATINE) tablet 10 mg  10 mg Oral TID AC Toni Oconnell MD       • norepinephrine (LEVOPHED) 8 mg/250 mL (32 mcg/mL) in sodium chloride 0.9% infusion (premix)  0.02-0.3 mcg/kg/min Intravenous Titrated Rizwan Saldaña MD 6.17 mL/hr at 07/14/17 1139 0.04 mcg/kg/min at 07/14/17 1139   • Pharmacy Consult   Does not apply Continuous PRN Norman Arizmendi MD       • piperacillin-tazobactam (ZOSYN) 3.375 g in iso-osmotic dextrose 50 ml (premix)  3.375 g Intravenous Q12H Norman Arizmendi MD   3.375 g at 07/14/17 0321   • propofol (DIPRIVAN) infusion 10 mg/mL 100 mL  5-50 mcg/kg/min Intravenous Titrated Norman Arizmendi MD 15.14 mL/hr at 07/13/17 1453 30 mcg/kg/min at 07/13/17 1453   • sodium chloride 0.9 % infusion  50 mL/hr Intravenous Continuous Rizwan Saldaña MD   Stopped at 07/13/17 1400     Facility-Administered Medications Ordered in Other Encounters   Medication Dose Route Frequency Provider Last Rate Last Dose   • Propofol (DIPRIVAN) injection    PRN Francesco Johnson MD   100 mg at 07/12/17 1155   • succinylcholine (ANECTINE) injection   Intravenous PRN Francesco Johnson MD   100 mg at 07/12/17 1155       Assessment/Plan     Active Problems:    Shortness of breath    Cardiogenic shock    Acute respiratory failure with hypoxia    Pulmonary edema    Hypocalcemia    Metabolic acidosis      Assessment:  (End-stage renal disease.  Status post cardiac arrest  Cardiogenic shock, currently on low-dose Levophed  Acute respiratory failure  Hypokalemia postdialysis, improved after replacement  Hypotension status post code  Worsening metabolic acidosis post code  Congestive heart failure  Type 2 diabetes mellitus  New hypocalcemia- check ionized AM  Pulmonary edema vs. aspiration pneumonia on chest x-ray,  pulmonary managing        Plan:   Reattempt dialysis today  AV fistula looks okay on exam with excellent thrill and bruit  If still unable to cannulate AV fistula will ask vascular surgery to evaluate  Ultrafiltration with dialysis as tolerated  Restart oral midodrine).             Continue to monitor renal function, electroytes and volume closely   Please call me with any questions or concerns      Toni Oconnell MD   [facility]   [phone]    07/14/17  1:53 PM      Dictation performed using Dragon dictation software

## 2017-07-14 NOTE — NURSING NOTE
The patient self extubated at shift change, she is alert X 4 on 3 liters NC; oxygen saturation 100%. Dr. Kinsey was notified and instructed to keep an eye on her.

## 2017-07-15 NOTE — PROGRESS NOTES
Subjective      Patient is awake and alert.  No complaints.  Had hemodialysis treatment yesterday.  Patient on levophed      Objective:    Vital Signs  Temp:  [97.7 °F (36.5 °C)-98 °F (36.7 °C)] 98 °F (36.7 °C)  Heart Rate:  [] 88  Resp:  [16] 16  Arterial Line BP: ()/(38-89) 117/51        Intake/Output Summary (Last 24 hours) at 07/15/17 1211  Last data filed at 07/15/17 0641   Gross per 24 hour   Intake              476 ml   Output                0 ml   Net              476 ml           Physical Exam    Physical Exam   Constitutional: She is oriented to person, place, and time. She appears well-developed and well-nourished. No distress.   HENT:   Head: Normocephalic and atraumatic.   Nose: Nose normal.   Mouth/Throat: Oropharynx is clear and moist.   Eyes: Conjunctivae and EOM are normal. Pupils are equal, round, and reactive to light. Right eye exhibits no discharge. Left eye exhibits no discharge.   Neck: Normal range of motion. Neck supple. No JVD present. No tracheal deviation present. No thyromegaly present.   Cardiovascular: Normal rate, regular rhythm, normal heart sounds and intact distal pulses.  Exam reveals no gallop and no friction rub.    No murmur heard.  Pulmonary/Chest: Effort normal and breath sounds normal. No stridor. No respiratory distress. She has no wheezes. She has no rales. She exhibits no tenderness.   Abdominal: Soft. Bowel sounds are normal. She exhibits no distension and no mass. There is no tenderness. There is no rebound and no guarding.   Musculoskeletal: Normal range of motion. She exhibits no edema, tenderness or deformity.   Has a functioning right forearm AV fistula   Lymphadenopathy:     She has no cervical adenopathy.   Neurological: She is alert and oriented to person, place, and time. She displays normal reflexes. No cranial nerve deficit. She exhibits normal muscle tone. Coordination normal.   Skin: Skin is warm and dry. No rash noted. She is not  diaphoretic. No erythema. No pallor.   Psychiatric: She has a normal mood and affect. Her behavior is normal. Judgment and thought content normal.        Results Review:      Lab Results   Component Value Date    GLUCOSE 155 (H) 07/15/2017    CALCIUM 8.5 (L) 07/15/2017     (L) 07/15/2017    K 3.4 (L) 07/15/2017    CO2 22.1 07/15/2017    CL 90 (L) 07/15/2017    BUN 23 07/15/2017    CREATININE 4.68 (H) 07/15/2017    EGFRIFAFRI 11 (L) 07/15/2017    EGFRIFNONA 7 (L) 03/29/2017    BCR 4.9 (L) 07/15/2017    ANIONGAP 21.9 07/15/2017       Lab Results   Component Value Date    WBC 11.82 (H) 07/15/2017    HGB 11.5 (L) 07/15/2017    HCT 36.0 07/15/2017    MCV 95.0 07/15/2017     07/15/2017       Pertinent Imaging studies were reviewed      Medication Review:       Current Facility-Administered Medications:   •  acetaminophen (TYLENOL) tablet 650 mg, 650 mg, Oral, Q6H PRN, Feliberto Kinsey MD  •  famotidine (PEPCID) injection 20 mg, 20 mg, Intravenous, Daily, Norman Arizmendi MD, 20 mg at 07/15/17 0750  •  fentaNYL citrate (PF) (SUBLIMAZE) injection 25 mcg, 25 mcg, Intravenous, Q2H PRN, Feliberto Kinsey MD, 25 mcg at 07/15/17 1152  •  Glycerin-Hypromellose- (ARTIFICIAL TEARS) 0.2-0.2-1 % ophthalmic solution solution 2 drop, 2 drop, Both Eyes, Q3H PRN, Norman Arizmendi MD  •  heparin (porcine) 5000 UNIT/ML injection 5,000 Units, 5,000 Units, Subcutaneous, Q8H, Fer Abarca MD  •  midodrine (PROAMATINE) tablet 10 mg, 10 mg, Oral, TID AC, Toni Oconnell MD, 10 mg at 07/15/17 1111  •  norepinephrine (LEVOPHED) 8 mg/250 mL (32 mcg/mL) in sodium chloride 0.9% infusion (premix), 0.02-0.3 mcg/kg/min, Intravenous, Titrated, Fer Abarca MD, Last Rate: 6.17 mL/hr at 07/15/17 1109, 0.04 mcg/kg/min at 07/15/17 1109  •  Pharmacy Consult, , Does not apply, Continuous PRN, Norman Arizmnedi MD  •  piperacillin-tazobactam (ZOSYN) 3.375 g in iso-osmotic dextrose 50 ml (premix), 3.375 g, Intravenous,  Q12H, Norman Arizmendi MD, 3.375 g at 07/15/17 0326    Facility-Administered Medications Ordered in Other Encounters:   •  Propofol (DIPRIVAN) injection, , , PRN, Francesco Johnson MD, 100 mg at 07/12/17 1155  •  succinylcholine (ANECTINE) injection, , Intravenous, PRN, Francesco Johnson MD, 100 mg at 07/12/17 1155    norepinephrine 0.02-0.3 mcg/kg/min Last Rate: 0.04 mcg/kg/min (07/15/17 1109)   Pharmacy Consult         Assesment  End-stage renal disease.  Status post cardiac arrest  Cardiogenic shock, currently on low-dose Levophed  Acute respiratory failure resolved   Hypokalemia postdialysis, improved after replacement  Hypotension status post code  Congestive heart failure  Type 2 diabetes mellitus  New hypocalcemia- check ionized AM  ? aspiration pneumonia on chest x-ray, pulmonary managing          Plan:  Patient had hemodialysis yesterday.  No evidence of any volume overload.  Do not see need for dialysis today.  Potassium is 3.4 Will replace orally.  Wean off pressors as tolerated        Veto Gray MD  07/15/17  12:11 PM  Tel: [phone]  Fax: [phone]

## 2017-07-15 NOTE — PROGRESS NOTES
"                                              LOS: 3 days   Patient Care Team:  Yamilet Zurita MD as PCP - General (Family Medicine)    Chief Complaint:  Follow-up on respiratory failure, pneumonia, status post cardiac arrest, pulmonary edema    Interval History:   Reported cough with mild bloody phlegm.  She denies shortness of breath at rest.  She is off oxygen with SPO2 of 93-89 percent.  Reported mild chest tightness but attributed that to the compression.  She is tender on palpation.  Tolerating diet.     REVIEW OF SYSTEMS:   CARDIOVASCULAR: Reported chest tightness but denies palpitation.  RESPIRATORY: No shortness of breath at rest.  Mild hemoptysis  GASTROINTESTINAL: No anorexia, nausea, vomiting or diarrhea. No abdominal pain or blood.   HEMATOLOGIC: No bruising.  Mild bloody phlegm    Ventilator/Non-Invasive Ventilation Settings     Start     Ordered         Vital Signs  Temp:  [97.7 °F (36.5 °C)-98 °F (36.7 °C)] 98 °F (36.7 °C)  Heart Rate:  [] 93  Resp:  [16] 16  Arterial Line BP: ()/(38-89) 112/43    Intake/Output Summary (Last 24 hours) at 07/15/17 1109  Last data filed at 07/15/17 0641   Gross per 24 hour   Intake              476 ml   Output                0 ml   Net              476 ml     Flowsheet Rows         First Filed Value    Admission Height  66\" (167.6 cm) Documented at 07/12/2017 1011    Admission Weight  181 lb 4 oz (82.2 kg) Documented at 07/12/2017 1011          Physical Exam:   General Appearance:    Alert, cooperative, in no acute distress   Lungs:     Clear to auscultation,respirations regular, even and                  Unlabored But decreased air entry at the bases     Heart:    Irregular rhythm but normal rate, normal S1 and S2, no            murmur, no gallop, no rub, no click   Chest Wall:    No abnormalities observed   Abdomen:     Obese, Normal bowel sounds,Soft, no tenderness    Neuro:   Conscious, alert, oriented x3   Extremities:   Moves all extremities well, " trace edema, no cyanosis, no             Redness          Results Review:          Results from last 7 days  Lab Units 07/15/17  0005 07/14/17  1158 07/13/17  0441   SODIUM mmol/L 134* 137 140   POTASSIUM mmol/L 3.4* 4.2 3.8   CHLORIDE mmol/L 90* 95* 105   CO2 mmol/L 22.1 16.4* 14.1*   BUN mg/dL 23 48* 26*   CREATININE mg/dL 4.68* 8.00* 5.45*   GLUCOSE mg/dL 155* 166* 188*   CALCIUM mg/dL 8.5* 8.7 7.2*           Results from last 7 days  Lab Units 07/15/17  0559 07/14/17  0543 07/13/17  0441   WBC 10*3/mm3 11.82* 12.39* 9.38   HEMOGLOBIN g/dL 11.5* 11.4* 10.8*   HEMATOCRIT % 36.0 35.2* 32.6*   PLATELETS 10*3/mm3 198 242 213       Results from last 7 days  Lab Units 07/15/17  0559 07/14/17  0543 07/13/17  1243   INR  2.53* 2.86* 2.71*   APTT seconds 40.5* 38.6* 41.3*         @LABRCNT(bnp)@  I reviewed the patient's new clinical results.  I personally viewed and interpreted the patient's CXR        Medication Review:     famotidine 20 mg Intravenous Daily   heparin (porcine) 5,000 Units Subcutaneous Q8H   midodrine 10 mg Oral TID AC   piperacillin-tazobactam 3.375 g Intravenous Q12H         norepinephrine 0.02-0.3 mcg/kg/min Last Rate: 0.03 mcg/kg/min (07/15/17 1100)   Pharmacy Consult     propofol 5-50 mcg/kg/min Last Rate: 30 mcg/kg/min (07/13/17 1453)   sodium chloride 50 mL/hr Last Rate: Stopped (07/13/17 1400)       Diagnostic imaging:  I personally and independently reviewed the Following images:  Pulmonary edema, slightly worse,Possibly after removal of positive pressure ventilation.  There is trace pleural effusion on the left.         ASSESSMENT:   1) Cardiopulmonary arrest  2) Respiratory failure, liberated off the vent 7/13  3) Aspiration pneumonia: Sputum culture 7/12 --> Few WBC, mixed nuno  4) Pulmonary edema  5) Hypotensive cardiogenic shock  6) Acute on chronic CHF with an ejection fraction of 35%  7) End-stage renal disease: Follows with Dr. Concepcion, S/P Dialysis on 7/14  8) Obstructive sleep apnea  9)  COPD, no exacerbation  10) Chronic atrial fibrillation  11) History of valvular heart disease with aortic valve replacement  12) Metabolic acidosis   13) Mild hypokalemia  14) Mild anemia  15) Mild hemoptysis, 2ary to #3, edema and elevated INR  16) Elevated INR 2ary to Coumadin treatment    Plan:   -I checked Procal which is elevated and therefore will continue Zocyn day 4  -Continue Midodrine  -Titrate to wean off Levophed. Target MAP 60 due to low EF and chronically low BP  -Stop IV fluid in the setting of pulmonary edema  -Continue CCU care until weaned off Levophed  -DVT prophylaxis    Discussed with the patient, family and staff.      Fer Abarca MD  07/15/17  11:09 AM              EMR Dragon/Transcription disclaimer:   Much of this encounter note is an electronic transcription/translation of spoken language to printed text. The electronic translation of spoken language may permit erroneous, or at times, nonsensical words or phrases to be inadvertently transcribed; Although I have reviewed the note for such errors, some may still exist.

## 2017-07-15 NOTE — PROGRESS NOTES
Hospital Follow Up    LOS:  LOS: 3 days   Patient Name: Keisha Nelson  Age/Sex: 72 y.o. female  : 1945  MRN: [id number]    Date of Hospital Visit: 07/15/17  Length of Stay: 3  Encounter Provider: Alessanrdo Abdalla MD  Place of Service: [facility] CARDIOLOGY    Subjective:     Follow Up for: Nonischemic cardiomyopathy   Interval History: Patient reports feeling a little better today.  She does have some tenderness in her chest.  Shortness of breath appears to be slightly improved.  Blood pressure slightly better.  Objective:     Objective:  Temp:  [97.7 °F (36.5 °C)-98 °F (36.7 °C)] 97.7 °F (36.5 °C)  Heart Rate:  [] 96  Arterial Line BP: ()/(43-89) 121/51  Body mass index is 29.71 kg/(m^2).    Intake/Output Summary (Last 24 hours) at 07/15/17 0709  Last data filed at 07/15/17 0641   Gross per 24 hour   Intake              476 ml   Output                0 ml   Net              476 ml     Last 3 weights    17  0400 17  0500 07/15/17  0651   Weight: 188 lb 11.4 oz (85.6 kg) 187 lb 2.7 oz (84.9 kg) 184 lb 1.4 oz (83.5 kg)     Weight change: -3 lb 1.4 oz (-1.4 kg)    Physical Exam:   General Appearance: Alert, cooperative, in no acute distress. AAOx4.   HEENT: Normocephalic.  Neck: Supple. No JVD. No Carotid bruit. No thyromegaly  Lungs: CTAB. Normal respiratory effort and rate.  Heart:: Regular rate and rhythm, normal S1 and S2, no murmurs, gallops or rubs.  Abdomen: Soft, nontender, non-distended. positive bowel sounds  Extremities: Warm, no cyanosis, or clubbing. No edema.     Lab Review:     Results from last 7 days  Lab Units 07/15/17  0005 17  1158   SODIUM mmol/L 134* 137   POTASSIUM mmol/L 3.4* 4.2   CHLORIDE mmol/L 90* 95*   CO2 mmol/L 22.1 16.4*   BUN mg/dL 23 48*   CREATININE mg/dL 4.68* 8.00*   GLUCOSE mg/dL 155* 166*   CALCIUM mg/dL 8.5* 8.7           Results from last 7 days  Lab Units 07/15/17  0559 17  0543   WBC 10*3/mm3  11.82* 12.39*   HEMOGLOBIN g/dL 11.5* 11.4*   HEMATOCRIT % 36.0 35.2*   PLATELETS 10*3/mm3 198 242       Results from last 7 days  Lab Units 07/15/17  0559 07/14/17  0543   INR  2.53* 2.86*   APTT seconds 40.5* 38.6*                         I reviewed the patient's new clinical results.          I personally viewed and interpreted the patient's EKG/Telemetry data.  Current Medications:   Scheduled Meds:  famotidine 20 mg Intravenous Daily   midodrine 10 mg Oral TID AC   piperacillin-tazobactam 3.375 g Intravenous Q12H     Continuous Infusions:  heparin (porcine) 17.5 Units/kg/hr    norepinephrine 0.02-0.3 mcg/kg/min Last Rate: 0.04 mcg/kg/min (07/15/17 0006)   Pharmacy Consult     propofol 5-50 mcg/kg/min Last Rate: 30 mcg/kg/min (07/13/17 1453)   sodium chloride 50 mL/hr Last Rate: Stopped (07/13/17 1400)       Allergies:  No Known Allergies    Assessment & Plan     Active Problems:    Shortness of breath    Cardiogenic shock    Acute respiratory failure with hypoxia    Pulmonary edema    Hypocalcemia    Metabolic acidosis          Plan: Continue supportive care.      Alessandro Abdalla MD  07/15/17

## 2017-07-15 NOTE — PLAN OF CARE
Problem: Patient Care Overview (Adult)  Goal: Plan of Care Review  Outcome: Ongoing (interventions implemented as appropriate)    07/15/17 0516   Coping/Psychosocial Response Interventions   Plan Of Care Reviewed With patient   Patient Care Overview   Progress improving   Outcome Evaluation   Outcome Summary/Follow up Plan Pt remains on levophed gtt, weaning. Pain controlled, frequent diarrhea, tolerated 2L NC.        Goal: Adult Individualization and Mutuality  Outcome: Ongoing (interventions implemented as appropriate)  Goal: Discharge Needs Assessment  Outcome: Ongoing (interventions implemented as appropriate)    Problem: Cardiac Catheterization with/without PCI (Adult)  Goal: Signs and Symptoms of Listed Potential Problems Will be Absent or Manageable (Cardiac Catheterization with/without PCI)  Outcome: Ongoing (interventions implemented as appropriate)    Problem: Respiratory Insufficiency (Adult)  Goal: Identify Related Risk Factors and Signs and Symptoms  Outcome: Ongoing (interventions implemented as appropriate)  Goal: Acid/Base Balance  Outcome: Ongoing (interventions implemented as appropriate)  Goal: Effective Ventilation  Outcome: Ongoing (interventions implemented as appropriate)

## 2017-07-15 NOTE — PLAN OF CARE
Problem: Patient Care Overview (Adult)  Goal: Plan of Care Review  Outcome: Ongoing (interventions implemented as appropriate)    07/15/17 9234   Coping/Psychosocial Response Interventions   Plan Of Care Reviewed With patient   Patient Care Overview   Progress no change   Outcome Evaluation   Outcome Summary/Follow up Plan Attempted but unable to wean levophed. Pain control becoming an issues with mobility d/t femoral line. Imodium started for diarrhea, tolerating well. No current plans for HD. Discussed plan of care with patient, VS otherwise stable, will continue to monitor.          Problem: Cardiac Catheterization with/without PCI (Adult)  Goal: Signs and Symptoms of Listed Potential Problems Will be Absent or Manageable (Cardiac Catheterization with/without PCI)  Outcome: Ongoing (interventions implemented as appropriate)    Problem: Respiratory Insufficiency (Adult)  Goal: Identify Related Risk Factors and Signs and Symptoms  Outcome: Outcome(s) achieved Date Met:  07/15/17  Goal: Acid/Base Balance  Outcome: Ongoing (interventions implemented as appropriate)  Goal: Effective Ventilation  Outcome: Ongoing (interventions implemented as appropriate)

## 2017-07-16 NOTE — PROGRESS NOTES
"                                              LOS: 4 days   Patient Care Team:  Yamilet Zurita MD as PCP - General (Family Medicine)    Chief Complaint:  Follow-up on respiratory failure, pneumonia, status post cardiac arrest, pulmonary edema    Interval History:   She continues to experience mild chest tightness.  No dyspnea at rest.  Cough is productive of clear phlegm now.  It was bloody yesterday.  She has an arterial line which does not correlate with her cuff pressure.  She is on Levophed at very low dose (0.02).  It was attempted to take her off levo fed but her blood pressure dropped down to 70/30.  She has a low blood pressure at home and takes Midodrin.     REVIEW OF SYSTEMS:   CARDIOVASCULAR: Reported chest tightness but denies palpitation.  RESPIRATORY: No shortness of breath at rest.  Mild cough  GASTROINTESTINAL: No anorexia, nausea, vomiting or diarrhea. No abdominal pain or blood.   HEMATOLOGIC: No bruising.     Ventilator/Non-Invasive Ventilation Settings     Start     Ordered         Vital Signs  Temp:  [97.4 °F (36.3 °C)-98.1 °F (36.7 °C)] 97.4 °F (36.3 °C)  Heart Rate:  [] 96  Arterial Line BP: ()/(32-58) 91/43    Intake/Output Summary (Last 24 hours) at 07/16/17 0951  Last data filed at 07/16/17 0600   Gross per 24 hour   Intake            338.3 ml   Output                0 ml   Net            338.3 ml     Flowsheet Rows         First Filed Value    Admission Height  66\" (167.6 cm) Documented at 07/12/2017 1011    Admission Weight  181 lb 4 oz (82.2 kg) Documented at 07/12/2017 1011          Physical Exam:   General Appearance:    Alert, cooperative, in no acute distress   Lungs:     Clear to auscultation,respirations regular, even and                  Unlabored But decreased air entry at the bases     Heart:    Irregular rhythm but normal rate, normal S1 and S2, no            murmur, no gallop, no rub, no click   Chest Wall:    No abnormalities observed   Abdomen:     Obese, " Normal bowel sounds,Soft, no tenderness    Neuro:   Conscious, alert, oriented x3   Extremities:   Moves all extremities well, trace edema, no cyanosis, no             Redness          Results Review:          Results from last 7 days  Lab Units 07/15/17  0005 07/14/17  1158 07/13/17  0441   SODIUM mmol/L 134* 137 140   POTASSIUM mmol/L 3.4* 4.2 3.8   CHLORIDE mmol/L 90* 95* 105   CO2 mmol/L 22.1 16.4* 14.1*   BUN mg/dL 23 48* 26*   CREATININE mg/dL 4.68* 8.00* 5.45*   GLUCOSE mg/dL 155* 166* 188*   CALCIUM mg/dL 8.5* 8.7 7.2*           Results from last 7 days  Lab Units 07/16/17  0556 07/15/17  0559 07/14/17  0543   WBC 10*3/mm3 10.32 11.82* 12.39*   HEMOGLOBIN g/dL 11.4* 11.5* 11.4*   HEMATOCRIT % 35.5* 36.0 35.2*   PLATELETS 10*3/mm3 214 198 242       Results from last 7 days  Lab Units 07/16/17  0556 07/15/17  0559 07/14/17  0543   INR  2.19* 2.53* 2.86*   APTT seconds 38.8* 40.5* 38.6*         @LABRCNT(bnp)@  I reviewed the patient's new clinical results.  I personally viewed and interpreted the patient's CXR        Medication Review:     famotidine 20 mg Intravenous Daily   heparin (porcine) 5,000 Units Subcutaneous Q8H   midodrine 10 mg Oral TID AC   piperacillin-tazobactam 3.375 g Intravenous Q12H   warfarin 5 mg Oral Daily         norepinephrine 0.02-0.3 mcg/kg/min Last Rate: 0.02 mcg/kg/min (07/16/17 0926)   Pharmacy Consult         Diagnostic imaging:  I personally and independently reviewed the Following images:  Pulmonary edema, slightly worse,Possibly after removal of positive pressure ventilation.  There is trace pleural effusion on the left.         ASSESSMENT:   1) Cardiopulmonary arrest  2) Respiratory failure, liberated off the vent 7/13  3) Aspiration pneumonia: Sputum culture 7/12 --> Few WBC, mixed nuno  4) Pulmonary edema  5) Hypotensive cardiogenic shock  6) Acute on chronic CHF with an ejection fraction of 35%  7) End-stage renal disease: Follows with Dr. Concepcion, S/P Dialysis on 7/14  8)  Obstructive sleep apnea  9) COPD, no exacerbation  10) Chronic atrial fibrillation  11) History of valvular heart disease with aortic valve replacement  12) Metabolic acidosis   13) Mild hypokalemia  14) Mild anemia  15) Mild hemoptysis, 2ary to #3, edema and elevated INR  16) Elevated INR 2ary to Coumadin treatment    Plan:   -Zocyn day 6/7-10  -Continue Midodrine  -Check Dexamethasone stim test due to persistent hypotension  -Stop Levophed and assess mental status  -Stop Heparin DVT proph. Cardiology started Warfarin 5 mg  -BMP tomorrow  -Continue CCU care until off Levophed    Discussed with the patient  and staff.      Fer Abarca MD  07/16/17  9:51 AM              EMR Dragon/Transcription disclaimer:   Much of this encounter note is an electronic transcription/translation of spoken language to printed text. The electronic translation of spoken language may permit erroneous, or at times, nonsensical words or phrases to be inadvertently transcribed; Although I have reviewed the note for such errors, some may still exist.

## 2017-07-16 NOTE — PROGRESS NOTES
Hospital Follow Up    LOS:  LOS: 4 days   Patient Name: Keisha Nelson  Age/Sex: 72 y.o. female  : 1945  MRN: [id number]    Date of Hospital Visit: 17  Length of Stay: 4  Encounter Provider: Alessandro Abdalla MD  Place of Service: [facility] CARDIOLOGY    Subjective:     Follow Up for: cardiomyopathy    Interval History: weaning from levophed but still very sensitive.  Dyspnea improved.  Remains in atrial fib with controlled rate    Objective:     Objective:  Temp:  [97.5 °F (36.4 °C)-98.1 °F (36.7 °C)] 97.5 °F (36.4 °C)  Heart Rate:  [] 89  Resp:  [16] 16  Arterial Line BP: ()/(32-58) 109/45  Body mass index is 29.71 kg/(m^2).    Intake/Output Summary (Last 24 hours) at 17 0622  Last data filed at 07/15/17 1620   Gross per 24 hour   Intake            580.3 ml   Output                0 ml   Net            580.3 ml     Last 3 weights    17  0400 17  0500 07/15/17  0651   Weight: 188 lb 11.4 oz (85.6 kg) 187 lb 2.7 oz (84.9 kg) 184 lb 1.4 oz (83.5 kg)     Weight change:     Physical Exam:   General Appearance: Alert, cooperative, in no acute distress. AAOx4.   HEENT: Normocephalic.  Neck: Supple. No JVD. No Carotid bruit. No thyromegaly  Lungs: CTAB. Normal respiratory effort and rate.  Heart:: irregular rhythm, PMI laterally displaced, +S3  Abdomen: Soft, nontender, non-distended. positive bowel sounds  Extremities: Warm, no cyanosis, or clubbing. No edema.     Lab Review:     Results from last 7 days  Lab Units 07/15/17  0005 17  1158   SODIUM mmol/L 134* 137   POTASSIUM mmol/L 3.4* 4.2   CHLORIDE mmol/L 90* 95*   CO2 mmol/L 22.1 16.4*   BUN mg/dL 23 48*   CREATININE mg/dL 4.68* 8.00*   GLUCOSE mg/dL 155* 166*   CALCIUM mg/dL 8.5* 8.7           Results from last 7 days  Lab Units 07/15/17  0559 17  0543   WBC 10*3/mm3 11.82* 12.39*   HEMOGLOBIN g/dL 11.5* 11.4*   HEMATOCRIT % 36.0 35.2*   PLATELETS 10*3/mm3 198 242        Results from last 7 days  Lab Units 07/15/17  0559 07/14/17  0543   INR  2.53* 2.86*   APTT seconds 40.5* 38.6*                         I reviewed the patient's new clinical results.          I personally viewed and interpreted the patient's EKG/Telemetry data.  Current Medications:   Scheduled Meds:  famotidine 20 mg Intravenous Daily   heparin (porcine) 5,000 Units Subcutaneous Q8H   midodrine 10 mg Oral TID AC   piperacillin-tazobactam 3.375 g Intravenous Q12H     Continuous Infusions:  norepinephrine 0.02-0.3 mcg/kg/min Last Rate: 0.02 mcg/kg/min (07/15/17 2042)   Pharmacy Consult         Allergies:  No Known Allergies    Assessment & Plan   1. Cardiomyopathy: LVEF 35% by echo, non-ischemic  2. Atrial Fib: chronic.  Rate controlled. INR theraqpeutic.  Remains on heparin  3. Ventricular Fib/tachycardia: s/p AICD.  No recent events  4. S/P Arrest during Cath: now extubated  5. End Stage Renal failure: on dialysis, per pulmonary  6. Hypotension: on midodrine & low dose levophed  7. Respiratory failure: per pulmonary  8. Aspiration: on abx.          Alessandro Abdalla MD  07/16/17

## 2017-07-16 NOTE — PLAN OF CARE
Problem: Patient Care Overview (Adult)  Goal: Plan of Care Review  Outcome: Ongoing (interventions implemented as appropriate)    07/16/17 1710   Coping/Psychosocial Response Interventions   Plan Of Care Reviewed With patient   Patient Care Overview   Progress progress toward functional goals as expected   Outcome Evaluation   Outcome Summary/Follow up Plan Discussed BP goals with MD today, able to wean levophed off with new parameters. MD wishes to watch patient in CCU overnight then anticipate transfer to floor tomorrow. Central line and A-line remain until transfer. BMs better controlled with immodium. Plan for HD Monday. Plan of care discussed with patient, questions answered and concerns addressed. VS otherwise stable, will continue to monitor.,          Problem: Cardiac Catheterization with/without PCI (Adult)  Goal: Signs and Symptoms of Listed Potential Problems Will be Absent or Manageable (Cardiac Catheterization with/without PCI)  Outcome: Ongoing (interventions implemented as appropriate)    Problem: Respiratory Insufficiency (Adult)  Goal: Acid/Base Balance  Outcome: Ongoing (interventions implemented as appropriate)  Goal: Effective Ventilation  Outcome: Ongoing (interventions implemented as appropriate)

## 2017-07-16 NOTE — PROGRESS NOTES
Subjective      Patient is awake and alert.  On Levo    Objective:    Vital Signs  Temp:  [97.4 °F (36.3 °C)-98.1 °F (36.7 °C)] 97.4 °F (36.3 °C)  Heart Rate:  [] 88  Arterial Line BP: ()/(32-58) 97/45        Intake/Output Summary (Last 24 hours) at 07/16/17 0949  Last data filed at 07/16/17 0600   Gross per 24 hour   Intake            338.3 ml   Output                0 ml   Net            338.3 ml           Physical Exam    Physical Exam   Constitutional: She is oriented to person, place, and time. She appears well-developed and well-nourished. No distress.   HENT:   Head: Normocephalic and atraumatic.   Nose: Nose normal.   Mouth/Throat: Oropharynx is clear and moist.   Eyes: Conjunctivae and EOM are normal. Pupils are equal, round, and reactive to light. Right eye exhibits no discharge. Left eye exhibits no discharge.   Neck: Normal range of motion. Neck supple. No JVD present. No tracheal deviation present. No thyromegaly present.   Cardiovascular: Normal rate, regular rhythm, normal heart sounds and intact distal pulses.  Exam reveals no gallop and no friction rub.    No murmur heard.  Pulmonary/Chest: Effort normal and breath sounds normal. No stridor. No respiratory distress. She has no wheezes. She has no rales. She exhibits no tenderness.   Abdominal: Soft. Bowel sounds are normal. She exhibits no distension and no mass. There is no tenderness. There is no rebound and no guarding.   Musculoskeletal: Normal range of motion. She exhibits no edema, tenderness or deformity.   Has a functioning right forearm AV fistula   Lymphadenopathy:     She has no cervical adenopathy.   Neurological: She is alert and oriented to person, place, and time. She displays normal reflexes. No cranial nerve deficit. She exhibits normal muscle tone. Coordination normal.   Skin: Skin is warm and dry. No rash noted. She is not diaphoretic. No erythema. No pallor.   Psychiatric: She has a normal mood and affect. Her  behavior is normal. Judgment and thought content normal.        Results Review:      Lab Results   Component Value Date    GLUCOSE 155 (H) 07/15/2017    CALCIUM 8.5 (L) 07/15/2017     (L) 07/15/2017    K 3.4 (L) 07/15/2017    CO2 22.1 07/15/2017    CL 90 (L) 07/15/2017    BUN 23 07/15/2017    CREATININE 4.68 (H) 07/15/2017    EGFRIFAFRI 11 (L) 07/15/2017    EGFRIFNONA 7 (L) 03/29/2017    BCR 4.9 (L) 07/15/2017    ANIONGAP 21.9 07/15/2017       Lab Results   Component Value Date    WBC 10.32 07/16/2017    HGB 11.4 (L) 07/16/2017    HCT 35.5 (L) 07/16/2017    MCV 92.2 07/16/2017     07/16/2017       Pertinent Imaging studies were reviewed      Medication Review:       Current Facility-Administered Medications:   •  acetaminophen (TYLENOL) tablet 650 mg, 650 mg, Oral, Q6H PRN, Feliberto Kinsey MD  •  famotidine (PEPCID) injection 20 mg, 20 mg, Intravenous, Daily, Norman Arizmendi MD, 20 mg at 07/16/17 0816  •  fentaNYL citrate (PF) (SUBLIMAZE) injection 25 mcg, 25 mcg, Intravenous, Q2H PRN, Feliberto Kinsey MD, 25 mcg at 07/16/17 0935  •  Glycerin-Hypromellose- (ARTIFICIAL TEARS) 0.2-0.2-1 % ophthalmic solution solution 2 drop, 2 drop, Both Eyes, Q3H PRN, Norman Arizmendi MD, 2 drop at 07/15/17 1408  •  heparin (porcine) 5000 UNIT/ML injection 5,000 Units, 5,000 Units, Subcutaneous, Q8H, Fer Abarca MD, 5,000 Units at 07/16/17 0556  •  loperamide (IMODIUM) capsule 2 mg, 2 mg, Oral, 4x Daily PRN, Fer Abarca MD, 2 mg at 07/16/17 0816  •  midodrine (PROAMATINE) tablet 10 mg, 10 mg, Oral, TID AC, Toni Oconnell MD, 10 mg at 07/16/17 0816  •  norepinephrine (LEVOPHED) 8 mg/250 mL (32 mcg/mL) in sodium chloride 0.9% infusion (premix), 0.02-0.3 mcg/kg/min, Intravenous, Titrated, Fer Abarca MD, Last Rate: 3.08 mL/hr at 07/16/17 0926, 0.02 mcg/kg/min at 07/16/17 0926  •  Pharmacy Consult, , Does not apply, Continuous PRN, Norman Arizmendi MD  •  piperacillin-tazobactam (ZOSYN)  3.375 g in iso-osmotic dextrose 50 ml (premix), 3.375 g, Intravenous, Q12H, Norman Arizmendi MD, 3.375 g at 07/16/17 0310  •  warfarin (COUMADIN) tablet 5 mg, 5 mg, Oral, Daily, Alessandro Abdalla MD    Facility-Administered Medications Ordered in Other Encounters:   •  Propofol (DIPRIVAN) injection, , , PRN, Francesco Johnson MD, 100 mg at 07/12/17 1155  •  succinylcholine (ANECTINE) injection, , Intravenous, PRN, Francesco Johnson MD, 100 mg at 07/12/17 1155    norepinephrine 0.02-0.3 mcg/kg/min Last Rate: 0.02 mcg/kg/min (07/16/17 0926)   Pharmacy Consult         Assesment  End-stage renal disease.  Status post cardiac arrest  Cardiogenic shock, currently on low-dose Levophed  Acute respiratory failure resolved   Hypokalemia postdialysis, improved after replacement  Hypotension status post code  Congestive heart failure  Type 2 diabetes mellitus  New hypocalcemia- check ionized AM  ? aspiration pneumonia on chest x-ray, pulmonary managing          Plan:  Plan for hemodialysis tomorrow.  Wean off pressors as tolerated.  We will obtain a basic metabolic profile today.      Veto Gray MD  07/16/17  9:49 AM  Tel: [phone]  Fax: [phone]

## 2017-07-17 NOTE — PROGRESS NOTES
"Patient Name: Keisha Nelson  :1945  72 y.o.      Patient Care Team:  Yamilet Zurita MD as PCP - General (Family Medicine)    Interval History:   Hypotensive.  Warfarin resumed.    Subjective:  Following for multiple cardiac issues including atrial fibrillation and a mechanical valve.     Objective   Vital Signs  Temp:  [97.3 °F (36.3 °C)-98 °F (36.7 °C)] 97.6 °F (36.4 °C)  Heart Rate:  [] 93  Arterial Line BP: ()/(37-57) 99/44    Intake/Output Summary (Last 24 hours) at 17 1138  Last data filed at 17 1630   Gross per 24 hour   Intake            124.1 ml   Output                0 ml   Net            124.1 ml     Flowsheet Rows         First Filed Value    Admission Height  66\" (167.6 cm) Documented at 2017 1011    Admission Weight  181 lb 4 oz (82.2 kg) Documented at 2017 1011          Physical Exam:   General Appearance:    Alert, cooperative, in no acute distress   Lungs:     Clear to auscultation.  Normal respiratory effort and rate.      Heart:    Regular rhythm and normal rate, normal S1 and S2, no murmurs, gallops or rubs.     Chest Wall:    No abnormalities observed   Abdomen:     Soft, nontender, positive bowel sounds.     Extremities:   no cyanosis, clubbing or edema.  No marked joint deformities.  Adequate musculoskeletal strength.       Results Review:      Results from last 7 days  Lab Units 17  0613   SODIUM mmol/L 136   POTASSIUM mmol/L 4.6   CHLORIDE mmol/L 93*   CO2 mmol/L 20.7*   BUN mg/dL 44*   CREATININE mg/dL 7.24*   GLUCOSE mg/dL 141*   CALCIUM mg/dL 9.4           Results from last 7 days  Lab Units 17  0613   WBC 10*3/mm3 8.59   HEMOGLOBIN g/dL 11.1*   HEMATOCRIT % 34.2*   PLATELETS 10*3/mm3 211       Results from last 7 days  Lab Units 17  0613 17  0556 07/15/17  0559   INR  2.33* 2.19* 2.53*   APTT seconds 38.5* 38.8* 40.5*                     Medication Review:     epoetin meliza 4,000 Units Intravenous Once   midodrine 10 " mg Oral TID AC   piperacillin-tazobactam 3.375 g Intravenous Q12H   warfarin 5 mg Oral Daily          Pharmacy Consult        Assessment/Plan     1. Shortness of breath/acute respiratory failure. She came to see me for persistent shortness of breath. I recommended a right and left heart catheterization which did not reveal a specific reason for her dyspnea. She had a wedge pressure of 21 with a pulmonary pressure of 55/24. She had no significant coronary artery disease. Transthoracic echocardiogram showed moderately decreased LV function with an ejection fraction of 35% with severe pulmonary hypertension and severe tricuspid regurgitation.  This was new when compared to previous evaluation.  I suspect it is tachycardia mediated.  2. Atrial fibrillation.  Pacemaker check in the office showed that she has persistently tachycardic. She has not been on rate lowering medication because of hypotension. She may benefit from an AV node ablation.Currently, she is not tachycardic.  Warfarin was resumed yesterday.  INR 2.3.  3. Aortic valve replacement. Mechanical. Only one leaflet clearly opening on cath. Not well visualized on echo.   4. She has systolic and diastolic heart failure. Her last echo in April 2017 showed her ejection fraction to be 50%. It is now down to 35%. This could be tachycardia mediated  5. COPD  6. Sleep apnea  7. End stage renal disease on hemodialysis  8. Diabetes  9. History of hypertension though more hypotension recently. She is on Midrin as an outpatient.  10. Hyperlipidemia  11. Ventricular tachycardia.  When her pacemaker was checked in my office earlier this month that showed 2 episodes of ventricular tachycardia/ventricular fibrillation with responded to antitachycardia pacing and did not require shock.    OK with me to DC A line and transfer to floor after HD if ok with critical care    Marli Johnson MD, [facility]  07/17/17  11:38 AM

## 2017-07-17 NOTE — PROGRESS NOTES
"                                              LOS: 5 days   Patient Care Team:  Yamilet Zurita MD as PCP - General (Family Medicine)    Chief Complaint:  Follow-up on respiratory failure, pneumonia, status post cardiac arrest, pulmonary edema    Interval History:   Mild chest tightness due to prior resuscitation.  Cough with clear phlegm.  No dyspnea at rest.  On minimal oxygen.  Receiving dialysis today     REVIEW OF SYSTEMS:   CARDIOVASCULAR: Reported chest tightness but denies palpitation.  RESPIRATORY: No shortness of breath at rest.  Mild cough  GASTROINTESTINAL: No anorexia, nausea, vomiting or diarrhea. No abdominal pain or blood.   HEMATOLOGIC: No bruising.     Ventilator/Non-Invasive Ventilation Settings     Start     Ordered         Vital Signs  Temp:  [97.3 °F (36.3 °C)-98 °F (36.7 °C)] 97.6 °F (36.4 °C)  Heart Rate:  [] 93  Arterial Line BP: ()/(37-57) 104/51    Intake/Output Summary (Last 24 hours) at 07/17/17 0928  Last data filed at 07/16/17 1630   Gross per 24 hour   Intake            124.1 ml   Output                0 ml   Net            124.1 ml     Flowsheet Rows         First Filed Value    Admission Height  66\" (167.6 cm) Documented at 07/12/2017 1011    Admission Weight  181 lb 4 oz (82.2 kg) Documented at 07/12/2017 1011          Physical Exam:   General Appearance:    Alert, cooperative, in no acute distress   Lungs:     Clear to auscultation,respirations regular, even and                  Unlabored But decreased air entry at the bases     Heart:    Irregular rhythm but normal rate, normal S1 and S2, no            murmur, no gallop, no rub, no click   Chest Wall:    No abnormalities observed   Abdomen:     Obese, Normal bowel sounds,Soft, no tenderness    Neuro:   Conscious, alert, oriented x3   Extremities:   Moves all extremities well, trace edema, no cyanosis, no             Redness          Results Review:          Results from last 7 days  Lab Units 07/17/17  0613 " 07/16/17  0952 07/15/17  0005   SODIUM mmol/L 136 136 134*   POTASSIUM mmol/L 4.6 4.1 3.4*   CHLORIDE mmol/L 93* 95* 90*   CO2 mmol/L 20.7* 21.2* 22.1   BUN mg/dL 44* 37* 23   CREATININE mg/dL 7.24* 6.32* 4.68*   GLUCOSE mg/dL 141* 159* 155*   CALCIUM mg/dL 9.4 9.1 8.5*           Results from last 7 days  Lab Units 07/17/17  0613 07/16/17  0556 07/15/17  0559   WBC 10*3/mm3 8.59 10.32 11.82*   HEMOGLOBIN g/dL 11.1* 11.4* 11.5*   HEMATOCRIT % 34.2* 35.5* 36.0   PLATELETS 10*3/mm3 211 214 198       Results from last 7 days  Lab Units 07/17/17  0613 07/16/17  0556 07/15/17  0559   INR  2.33* 2.19* 2.53*   APTT seconds 38.5* 38.8* 40.5*         @LABRCNT(bnp)@  I reviewed the patient's new clinical results.  I personally viewed and interpreted the patient's CXR        Medication Review:     famotidine 20 mg Intravenous Daily   heparin (porcine) 5,000 Units Subcutaneous Q8H   midodrine 10 mg Oral TID AC   piperacillin-tazobactam 3.375 g Intravenous Q12H   warfarin 5 mg Oral Daily         norepinephrine 0.02-0.3 mcg/kg/min Last Rate: Stopped (07/16/17 1227)   Pharmacy Consult         Diagnostic imaging:  I personally and independently reviewed the Following images:  Pulmonary edema, slightly worse,Possibly after removal of positive pressure ventilation.  There is trace pleural effusion on the left.         ASSESSMENT:   1) Cardiopulmonary arrest  2) Respiratory failure, liberated off the vent 7/13  3) Aspiration pneumonia: Sputum culture 7/12 --> Few WBC, mixed nuno  4) Pulmonary edema  5) Hypotensive cardiogenic shock, resolved. Now with persistent hypotension which is at baseline- Stim test does not suggest adrenal insufficiency  6) Acute on chronic CHF with an ejection fraction of 35%  7) End-stage renal disease: Follows with Dr. Concepcion  8) Obstructive sleep apnea  9) COPD, no exacerbation  10) Chronic atrial fibrillation  11) History of valvular heart disease with aortic valve replacement  12) Metabolic acidosis   13)  Mild hypokalemia  14) Mild anemia  15) Mild hemoptysis, 2ary to #3, edema and elevated INR  16) Elevated INR 2ary to Coumadin treatment    Plan:   -Zocyn day 6/7 will stop tomorrow  -Continue Midodrine  -HD today per nephro  -Recheck the blood pressure in her left arm where she has the arterial line and does correlate very well without outlined readings.  Therefore, the arterial line will be removed.    -Transfer to floor      Discussed with the patient  and staff.      Fer Abarca MD  07/17/17  9:28 AM              EMR Dragon/Transcription disclaimer:   Much of this encounter note is an electronic transcription/translation of spoken language to printed text. The electronic translation of spoken language may permit erroneous, or at times, nonsensical words or phrases to be inadvertently transcribed; Although I have reviewed the note for such errors, some may still exist.

## 2017-07-17 NOTE — PROGRESS NOTES
"   LOS: 5 days   Patient Care Team:  Yamilet Zurita MD as PCP - General (Family Medicine)    Chief Complaint/ Reason for encounter: End-stage renal disease, dialysis management        Subjective     History of Present Illness    Subjective:  Symptoms:  Improved.  She reports weakness.  No shortness of breath or chest pain.  (No new complaints  Doing well, hemodialysis just underway, discussed with dialysis RN, Scarlet   start with 3K bath based on K level, UF 1.5 kg based on exam today and BP  Monitor blood pressure closely with treatment).    Diet:  Poor intake.  No nausea.    Activity level: Returning to normal.    Pain:  She reports no pain.          History taken from: Patient and chart    Objective     Vital Signs  Temp:  [97.3 °F (36.3 °C)-98 °F (36.7 °C)] 97.6 °F (36.4 °C)  Heart Rate:  [] 93  Arterial Line BP: ()/(37-57) 99/44       Wt Readings from Last 1 Encounters:   07/17/17 0645 186 lb 4.6 oz (84.5 kg)   07/15/17 0651 184 lb 1.4 oz (83.5 kg)   07/14/17 0500 187 lb 2.7 oz (84.9 kg)   07/13/17 0400 188 lb 11.4 oz (85.6 kg)   07/12/17 1345 185 lb 6.5 oz (84.1 kg)   07/12/17 1011 181 lb 4 oz (82.2 kg)       Objective:  General Appearance:  Comfortable, in no acute distress, not in pain and ill-appearing (Intubated and sedated on the ventilator).    Vital signs: (most recent): Blood pressure (!) 60/36, pulse 93, temperature 97.6 °F (36.4 °C), temperature source Oral, resp. rate 16, height 66\" (167.6 cm), weight 186 lb 4.6 oz (84.5 kg), SpO2 100 %.  Vital signs are normal.  No fever.    Output: Minimal urine output.    HEENT: Normal HEENT exam.    Lungs:  Normal respiratory rate and normal effort.  She is not in respiratory distress.  There are decreased breath sounds.  No wheezes, rales or rhonchi.    Heart: Normal rate.  Regular rhythm.  S1 normal and S2 normal.  No murmur.   Abdomen: Abdomen is soft and non-distended.  Bowel sounds are normal.   There is no abdominal tenderness.  There is no " epigastric area or no suprapubic area tenderness.  There is no rebound tenderness.     Extremities: Normal range of motion.  There is no deformity or dependent edema.    Pulses: Distal pulses are intact.    Pupils:  Pupils are equal, round, and reactive to light.    Skin:  Warm and dry.  No rash or cyanosis.             Results Review:    Past Medical History: reviewed and updated  Past Medical History:   Diagnosis Date   • Acute diastolic CHF (congestive heart failure)    • Anxiety    • Asthma    • Atrial fibrillation    • COPD (chronic obstructive pulmonary disease)    • Depression    • Diabetes mellitus    • ESRD (end stage renal disease)    • Hyperlipidemia    • Hypertension    • Long term current use of anticoagulant therapy    • Pulmonary hypertension    • Sleep apnea    • Tricuspid regurgitation          Allergies:  No Known Allergies    Intake/Output:     Intake/Output Summary (Last 24 hours) at 07/17/17 1128  Last data filed at 07/16/17 1630   Gross per 24 hour   Intake            124.1 ml   Output                0 ml   Net            124.1 ml         DATA:  Interval chart, labs and notes reviewed.  The following labs independently reviewed by me    Labs:   Recent Results (from the past 24 hour(s))   Cortisol    Collection Time: 07/16/17  1:29 PM   Result Value Ref Range    Cortisol 14.75   mcg/dL   Cortisol    Collection Time: 07/16/17  2:00 PM   Result Value Ref Range    Cortisol 29.25   mcg/dL   Cortisol    Collection Time: 07/16/17  2:31 PM   Result Value Ref Range    Cortisol 35.61   mcg/dL   POC Glucose Fingerstick    Collection Time: 07/16/17  4:50 PM   Result Value Ref Range    Glucose 152 (H) 70 - 130 mg/dL   POC Glucose Fingerstick    Collection Time: 07/16/17  7:20 PM   Result Value Ref Range    Glucose 158 (H) 70 - 130 mg/dL   POC Glucose Fingerstick    Collection Time: 07/16/17 11:50 PM   Result Value Ref Range    Glucose 155 (H) 70 - 130 mg/dL   POC Glucose Fingerstick    Collection Time:  07/17/17  4:07 AM   Result Value Ref Range    Glucose 139 (H) 70 - 130 mg/dL   Protime-INR    Collection Time: 07/17/17  6:13 AM   Result Value Ref Range    Protime 24.7 (H) 11.7 - 14.2 Seconds    INR 2.33 (H) 0.90 - 1.10   aPTT    Collection Time: 07/17/17  6:13 AM   Result Value Ref Range    PTT 38.5 (H) 22.7 - 35.4 seconds   Basic Metabolic Panel    Collection Time: 07/17/17  6:13 AM   Result Value Ref Range    Glucose 141 (H) 65 - 99 mg/dL    BUN 44 (H) 8 - 23 mg/dL    Creatinine 7.24 (H) 0.57 - 1.00 mg/dL    Sodium 136 136 - 145 mmol/L    Potassium 4.6 3.5 - 5.2 mmol/L    Chloride 93 (L) 98 - 107 mmol/L    CO2 20.7 (L) 22.0 - 29.0 mmol/L    Calcium 9.4 8.6 - 10.5 mg/dL    eGFR  African Amer 7 (L) >60 mL/min/1.73    BUN/Creatinine Ratio 6.1 (L) 7.0 - 25.0    Anion Gap 22.3 mmol/L   CBC Auto Differential    Collection Time: 07/17/17  6:13 AM   Result Value Ref Range    WBC 8.59 4.50 - 10.70 10*3/mm3    RBC 3.72 (L) 3.90 - 5.20 10*6/mm3    Hemoglobin 11.1 (L) 11.9 - 15.5 g/dL    Hematocrit 34.2 (L) 35.6 - 45.5 %    MCV 91.9 80.5 - 98.2 fL    MCH 29.8 26.9 - 32.0 pg    MCHC 32.5 32.4 - 36.3 g/dL    RDW 18.1 (H) 11.7 - 13.0 %    RDW-SD 58.7 (H) 37.0 - 54.0 fl    MPV 10.1 6.0 - 12.0 fL    Platelets 211 140 - 500 10*3/mm3   Scan Slide    Collection Time: 07/17/17  6:13 AM   Result Value Ref Range    Scan Slide     Manual Differential    Collection Time: 07/17/17  6:13 AM   Result Value Ref Range    Neutrophil % 85.0 (H) 42.7 - 76.0 %    Lymphocyte % 10.0 (L) 19.6 - 45.3 %    Monocyte % 4.0 (L) 5.0 - 12.0 %    Metamyelocyte % 1.0 (H) 0.0 - 0.0 %    Neutrophils Absolute 7.30 1.90 - 8.10 10*3/mm3    Lymphocytes Absolute 0.86 (L) 0.90 - 4.80 10*3/mm3    Monocytes Absolute 0.34 0.20 - 1.20 10*3/mm3    nRBC 12.0 (H) 0.0 - 0.0 /100 WBC    Anisocytosis Mod/2+ None Seen    Crenated RBC's Slight/1+ None Seen    Target Cells Slight/1+ None Seen    WBC Morphology Normal Normal    Platelet Morphology Normal Normal   POC Glucose  Fingerstick    Collection Time: 07/17/17  7:52 AM   Result Value Ref Range    Glucose 150 (H) 70 - 130 mg/dL       Radiology:  Imaging Results (last 24 hours)     ** No results found for the last 24 hours. **             Medications have been reviewed:  Current Facility-Administered Medications   Medication Dose Route Frequency Provider Last Rate Last Dose   • acetaminophen (TYLENOL) tablet 650 mg  650 mg Oral Q6H PRN Feliberto Kinsey MD       • epoetin meliza (EPOGEN,PROCRIT) injection 4,000 Units  4,000 Units Intravenous Once Veto Gray MD       • fentaNYL citrate (PF) (SUBLIMAZE) injection 25 mcg  25 mcg Intravenous Q2H PRN Feliberto Kinsey MD   25 mcg at 07/17/17 0107   • Glycerin-Hypromellose- (ARTIFICIAL TEARS) 0.2-0.2-1 % ophthalmic solution solution 2 drop  2 drop Both Eyes Q3H PRN Norman Arizmendi MD   2 drop at 07/15/17 1408   • loperamide (IMODIUM) capsule 2 mg  2 mg Oral 4x Daily PRN Fer Abarca MD   2 mg at 07/16/17 1703   • mannitol 25% (RENAL) injection  25 g Intravenous PRN Veto Gray MD       • midodrine (PROAMATINE) tablet 10 mg  10 mg Oral TID AC Toni Oconnell MD   10 mg at 07/17/17 0810   • Pharmacy Consult   Does not apply Continuous PRN Norman Arizmendi MD       • piperacillin-tazobactam (ZOSYN) 3.375 g in iso-osmotic dextrose 50 ml (premix)  3.375 g Intravenous Q12H Norman Arizmendi MD   3.375 g at 07/17/17 0351   • warfarin (COUMADIN) tablet 5 mg  5 mg Oral Daily Alessandro Abdalla MD   5 mg at 07/16/17 1703     Facility-Administered Medications Ordered in Other Encounters   Medication Dose Route Frequency Provider Last Rate Last Dose   • Propofol (DIPRIVAN) injection    PRN Francesco Johnson MD   100 mg at 07/12/17 1155   • succinylcholine (ANECTINE) injection   Intravenous PRN Francesco Johnson MD   100 mg at 07/12/17 1155       Assessment/Plan     Active Problems:    Shortness of breath    Cardiogenic shock    Acute respiratory failure with hypoxia     Pulmonary edema    Hypocalcemia    Metabolic acidosis      Assessment:  (End-stage renal disease.  Status post cardiac arrest  Cardiogenic shock, improved, off Levophed  Acute respiratory failure, much improved  Hypokalemia postdialysis, improved after replacement, random 3K bath today  Hypotension status post code  Metabolic acidosis, improved  Congestive heart failure  Type 2 diabetes mellitus   hypocalcemia- improved        Plan:   Dialysis today and continue Monday Wednesday Friday schedule  Run On a 3K bath to avoid hypokalemia postdialysis  UF 1.5 kg with dialysis today   continue oral midodrine).             Continue to monitor renal function, electroytes and volume closely   Please call me with any questions or concerns      Toni Oconnell MD   [facility]   [phone]    07/17/17  11:28 AM      Dictation performed using Dragon dictation software

## 2017-07-17 NOTE — PLAN OF CARE
Problem: Patient Care Overview (Adult)  Goal: Plan of Care Review  Outcome: Ongoing (interventions implemented as appropriate)    07/17/17 0450   Coping/Psychosocial Response Interventions   Plan Of Care Reviewed With patient   Patient Care Overview   Progress improving   Outcome Evaluation   Outcome Summary/Follow up Plan Pt remains off of levophed gtt. Plan for HD later today. Pt continues to have loose BM's, skin care continued.        Goal: Adult Individualization and Mutuality  Outcome: Ongoing (interventions implemented as appropriate)  Goal: Discharge Needs Assessment  Outcome: Ongoing (interventions implemented as appropriate)    Problem: Cardiac Catheterization with/without PCI (Adult)  Goal: Signs and Symptoms of Listed Potential Problems Will be Absent or Manageable (Cardiac Catheterization with/without PCI)  Outcome: Ongoing (interventions implemented as appropriate)    Problem: Respiratory Insufficiency (Adult)  Goal: Acid/Base Balance  Outcome: Ongoing (interventions implemented as appropriate)  Goal: Effective Ventilation  Outcome: Ongoing (interventions implemented as appropriate)

## 2017-07-17 NOTE — SIGNIFICANT NOTE
07/17/17 1136   Rehab Treatment   Discipline physical therapist   Rehab Evaluation   Evaluation Not Performed patient unavailable for evaluation  (pt just started dialysis, will follow up tomorrow)   Recommendation   PT - Next Appointment 07/18/17

## 2017-07-18 NOTE — DISCHARGE PLACEMENT REQUEST
"Keisha Nelson (72 y.o. Female)     Date of Birth Social Security Number Address Home Phone MRN    1945  [id number] [phone] [id number]    Scientologist Marital Status          Sabianism Single       Admission Date Admission Type Admitting Provider Attending Provider Department, Room/Bed    7/12/17 Elective Rizwan Saldaña MD Dillon, William, MD [address], [address]1    Discharge Date Discharge Disposition Discharge Destination                      Attending Provider: Rizwan Saldaña MD     Allergies:  No Known Allergies    Isolation:  None   Infection:  None   Code Status:  FULL    Ht:  66\" (167.6 cm)   Wt:  186 lb 5 oz (84.5 kg)    Admission Cmt:  None   Principal Problem:  None                Active Insurance as of 7/12/2017     Primary Coverage     Payor Plan Insurance Group Employer/Plan Group    MEDICARE MEDICARE A & B      Payor Plan Address Payor Plan Phone Number Effective From Effective To    [address] [phone] 6/1/2010     [place] [address]       Subscriber Name Subscriber Birth Date Member ID       KEISHA NELSON 1945 [id number]           Secondary Coverage     Payor Plan Insurance Group Employer/Plan Group    ANTHEM BLUE CROSS [facility] SUPP [id number]     Payor Plan Address Payor Plan Phone Number Effective From Effective To    [address]  2/10/2017     [place] [address]       Subscriber Name Subscriber Birth Date Member ID       KEISHA NELSON 1945 [id number]                 Emergency Contacts      (Rel.) Home Phone Work Phone Mobile Phone    OmarNuha (Daughter) [phone] -- [phone]    Naveen Nelson (Son) [phone] -- --              "

## 2017-07-18 NOTE — PROGRESS NOTES
Discharge Planning Assessment  [facility]     Patient Name: Keisha Nelson  MRN: [id number]  Today's Date: 7/18/2017    Admit Date: 7/12/2017          Discharge Plan       07/18/17 1621    Case Management/Social Work Plan    Plan Home with [facility] vs possible rehab.      Additional Comments S/W pt at bedside and her dtr, Becka by phone ([phone]) per pt's consent.  Pt lives alone in [facility] living/ independent living apartment.  She is current with [facility].  Discussed DC options including home with [facility]  vs  possible rehab.  Becka lives in [facility] and is in agreement with referral to [facility].  She will talk to pt about it tonight.  PT eval  pending.  [facility] notified.  She will for HD chairtimes as well.          Discharge Placement     Facility/Agency Request Status Selected? Address Phone Number Fax Number    [facility] Accepted     [address] [facility], [address] [address] [phone]        Zora Peterson RN 7/18/2017 16:18    Notified by ePrla that pt is current               [facility] Pending - Request Sent     [address] [facility] [address] [phone]        Zora Peterson RN 7/18/2017 16:18    Referral to Jina Peterson RN

## 2017-07-18 NOTE — SIGNIFICANT NOTE
07/18/17 1318   Rehab Treatment   Discipline physical therapist   Rehab Evaluation   Evaluation Not Performed other (see comments)  (YEFRI Loera asked for PT to hold for today as patient is having chest pain. PT will check back tomorrow.)   Recommendation   PT - Next Appointment 07/19/17

## 2017-07-18 NOTE — NURSING NOTE
Spoke to Jaci with Dr. Paul's group and discussed how the patient was experiencing on and off chest pain. Told her we gave 1 dose of nitro as well as ordered an EKG and labs. No orders given at this time.

## 2017-07-18 NOTE — PROGRESS NOTES
"   LOS: 6 days   Patient Care Team:  Yamilet Zurita MD as PCP - General (Family Medicine)    Chief Complaint/ Reason for encounter: End-stage renal disease, dialysis management        Subjective     History of Present Illness    Subjective:  Patient reports feeling tired and sleepy.  She has HD yesterday with 1.8 liters removed. She states she tolerated well, with no cramping.  Reports show good BP during treatment  Today she reports sob    History taken from: Patient and chart    Objective     Vital Signs  Temp:  [97.6 °F (36.4 °C)-98.7 °F (37.1 °C)] 97.6 °F (36.4 °C)  Heart Rate:  [] 102  Resp:  [18] 18  BP: ()/(45-94) 90/51  Arterial Line BP: ()/(44-50) 99/44       Wt Readings from Last 1 Encounters:   07/18/17 0509 186 lb 5 oz (84.5 kg)   07/17/17 0645 186 lb 4.6 oz (84.5 kg)   07/15/17 0651 184 lb 1.4 oz (83.5 kg)   07/14/17 0500 187 lb 2.7 oz (84.9 kg)   07/13/17 0400 188 lb 11.4 oz (85.6 kg)   07/12/17 1345 185 lb 6.5 oz (84.1 kg)   07/12/17 1011 181 lb 4 oz (82.2 kg)       Objective:  General Appearance:  lyin in bed, chronically ill apparing NAD   Vital signs: (most recent): Blood pressure (!) 60/36, pulse 93, temperature 97.6 °F (36.4 °C), temperature source Oral, resp. rate 16, height 66\" (167.6 cm), weight 186 lb 4.6 oz (84.5 kg), SpO2 100 %.  Vital signs are normal.  No fever.    Output: Minimal urine output.    HEENT: Normal HEENT exam.    Lungs:  Normal respiratory rate and normal effort.  She is not in respiratory distress.  There are decreased breath sounds.  No wheezes,very mild crackles in bases  Heart: Normal rate.  Regular rhythm.  S1 normal and S2 normal.  No murmur.   Abdomen: Abdomen is soft and non-distended.  Bowel sounds are normal.   There is no abdominal tenderness.  There is no epigastric area or no suprapubic area tenderness.  There is no rebound tenderness.     Extremities: Normal range of motion.  There is no deformity or dependent edema. RUE AVF + thrill and " bruit   Pulses: Distal pulses are intact.    Pupils:  Pupils are equal, round, and reactive to light.    Skin:  Warm and dry.  No rash or cyanosis.             Results Review:    Past Medical History: reviewed and updated  Past Medical History:   Diagnosis Date   • Acute diastolic CHF (congestive heart failure)    • Anxiety    • Asthma    • Atrial fibrillation    • COPD (chronic obstructive pulmonary disease)    • Depression    • Diabetes mellitus    • ESRD (end stage renal disease)    • Hyperlipidemia    • Hypertension    • Long term current use of anticoagulant therapy    • Pulmonary hypertension    • Sleep apnea    • Tricuspid regurgitation          Allergies:  No Known Allergies    Intake/Output:     Intake/Output Summary (Last 24 hours) at 07/18/17 0813  Last data filed at 07/18/17 0348   Gross per 24 hour   Intake               50 ml   Output             1000 ml   Net             -950 ml         DATA:  Interval chart, labs and notes reviewed.  The following labs independently reviewed by me    Labs:   Recent Results (from the past 24 hour(s))   POC Glucose Fingerstick    Collection Time: 07/17/17  4:20 PM   Result Value Ref Range    Glucose 134 (H) 70 - 130 mg/dL   POC Glucose Fingerstick    Collection Time: 07/17/17  8:38 PM   Result Value Ref Range    Glucose 189 (H) 70 - 130 mg/dL   Protime-INR    Collection Time: 07/18/17  5:44 AM   Result Value Ref Range    Protime 30.6 (H) 11.7 - 14.2 Seconds    INR 3.06 (H) 0.90 - 1.10   aPTT    Collection Time: 07/18/17  5:44 AM   Result Value Ref Range    PTT 38.3 (H) 22.7 - 35.4 seconds   Renal Function Panel    Collection Time: 07/18/17  5:44 AM   Result Value Ref Range    Glucose 159 (H) 65 - 99 mg/dL    BUN 30 (H) 8 - 23 mg/dL    Creatinine 5.70 (H) 0.57 - 1.00 mg/dL    Sodium 132 (L) 136 - 145 mmol/L    Potassium 4.1 3.5 - 5.2 mmol/L    Chloride 89 (L) 98 - 107 mmol/L    CO2 18.1 (L) 22.0 - 29.0 mmol/L    Calcium 9.3 8.6 - 10.5 mg/dL    Albumin 3.20 (L) 3.50 -  5.20 g/dL    Phosphorus 6.0 (H) 2.5 - 4.5 mg/dL    Anion Gap 24.9 mmol/L    BUN/Creatinine Ratio 5.3 (L) 7.0 - 25.0    eGFR  African Amer 9 (L) >60 mL/min/1.73   CBC Auto Differential    Collection Time: 07/18/17  5:44 AM   Result Value Ref Range    WBC 9.04 4.50 - 10.70 10*3/mm3    RBC 3.95 3.90 - 5.20 10*6/mm3    Hemoglobin 11.7 (L) 11.9 - 15.5 g/dL    Hematocrit 36.7 35.6 - 45.5 %    MCV 92.9 80.5 - 98.2 fL    MCH 29.6 26.9 - 32.0 pg    MCHC 31.9 (L) 32.4 - 36.3 g/dL    RDW 18.8 (H) 11.7 - 13.0 %    RDW-SD 59.0 (H) 37.0 - 54.0 fl    MPV 10.3 6.0 - 12.0 fL    Platelets 200 140 - 500 10*3/mm3   Scan Slide    Collection Time: 07/18/17  5:44 AM   Result Value Ref Range    Scan Slide     Manual Differential    Collection Time: 07/18/17  5:44 AM   Result Value Ref Range    Neutrophil % 78.0 (H) 42.7 - 76.0 %    Lymphocyte % 14.0 (L) 19.6 - 45.3 %    Monocyte % 6.0 5.0 - 12.0 %    Metamyelocyte % 2.0 (H) 0.0 - 0.0 %    Neutrophils Absolute 7.05 1.90 - 8.10 10*3/mm3    Lymphocytes Absolute 1.27 0.90 - 4.80 10*3/mm3    Monocytes Absolute 0.54 0.20 - 1.20 10*3/mm3    nRBC 14.0 (H) 0.0 - 0.0 /100 WBC    Anisocytosis Mod/2+ None Seen    Crenated RBC's Slight/1+ None Seen    Polychromasia Slight/1+ None Seen    Target Cells Slight/1+ None Seen    WBC Morphology Normal Normal    Platelet Morphology Normal Normal       Radiology:  Imaging Results (last 24 hours)     ** No results found for the last 24 hours. **             Medications have been reviewed:  Current Facility-Administered Medications   Medication Dose Route Frequency Provider Last Rate Last Dose   • acetaminophen (TYLENOL) tablet 650 mg  650 mg Oral Q6H PRN Feliberto Kinsey MD   650 mg at 07/17/17 1715   • fentaNYL citrate (PF) (SUBLIMAZE) injection 25 mcg  25 mcg Intravenous Q2H PRN Feliberto Kinsey MD   25 mcg at 07/17/17 0107   • Glycerin-Hypromellose- (ARTIFICIAL TEARS) 0.2-0.2-1 % ophthalmic solution solution 2 drop  2 drop Both Eyes Q3H PRN Norman  Corona Arizmendi MD   2 drop at 07/15/17 1408   • loperamide (IMODIUM) capsule 2 mg  2 mg Oral 4x Daily PRN Fer Abarca MD   2 mg at 07/16/17 1703   • mannitol 25% (RENAL) injection  25 g Intravenous PRN Veto Gray MD   25 g at 07/17/17 1139   • midodrine (PROAMATINE) tablet 10 mg  10 mg Oral TID AC Toni Oconnell MD   10 mg at 07/17/17 1715   • Pharmacy Consult   Does not apply Continuous PRN Fer Abarca MD       • piperacillin-tazobactam (ZOSYN) 3.375 g in iso-osmotic dextrose 50 ml (premix)  3.375 g Intravenous Q12H Fer Abarca MD   3.375 g at 07/18/17 0348   • warfarin (COUMADIN) tablet 5 mg  5 mg Oral Daily Alessandro Abdalla MD   5 mg at 07/17/17 1715     Facility-Administered Medications Ordered in Other Encounters   Medication Dose Route Frequency Provider Last Rate Last Dose   • Propofol (DIPRIVAN) injection    PRN Francesco Johnson MD   100 mg at 07/12/17 1155   • succinylcholine (ANECTINE) injection   Intravenous PRN Francesco Johnson MD   100 mg at 07/12/17 1155       Assessment/Plan     Active Problems:    Shortness of breath    Cardiogenic shock    Acute respiratory failure with hypoxia    Pulmonary edema    Hypocalcemia    Metabolic acidosis      Assessment:  (End-stage renal disease.  Status post cardiac arrest  Cardiogenic shock, improved, off Levophed  Acute respiratory failure, much improved  Hypokalemia postdialysis, improved after replacement, random 3K bath today  Hypotension status post code  Metabolic acidosis, improved  Congestive heart failure  Type 2 diabetes mellitus   hypocalcemia- improved   hyperphosphatemia     Plan:   Will plan for UF today for 3 hours for sob and plan for Monday Wednesday Friday schedule  Run On a 3K bath to avoid hypokalemia postdialysis  continue oral midodrine   Start sevelamer for hyperphosphatemia  Continue to monitor renal function, electroytes and volume closely   Please call me with any questions or concerns  Hyponatremia likely due to volume  overload      Laura Frances MD   [facility]   [phone]    07/18/17  8:13 AM    UF held due to chest pain. Cardiology does not think its cardiac and patient is sob.   Ordered UF, but patient wants HD with UF today and no HD tomorrow  Ordered HD and will evaluate need for HD tomorrow.     7/18/17  16:03

## 2017-07-18 NOTE — PROGRESS NOTES
"Patient Name: Keisha Nelson  :1945  72 y.o.      Patient Care Team:  Yamilet Zurita MD as PCP - General (Family Medicine)    Interval History:   Moved to floor    Subjective:  Following for shortness of breath, cardiomyopathy and tachycardia     Objective   Vital Signs  Temp:  [97.6 °F (36.4 °C)-98.7 °F (37.1 °C)] 97.6 °F (36.4 °C)  Heart Rate:  [] 102  Resp:  [18] 18  BP: ()/(45-94) 90/51  Arterial Line BP: ()/(44-50) 99/44    Intake/Output Summary (Last 24 hours) at 17 0917  Last data filed at 17 0348   Gross per 24 hour   Intake               50 ml   Output             1000 ml   Net             -950 ml     Flowsheet Rows         First Filed Value    Admission Height  66\" (167.6 cm) Documented at 2017 1011    Admission Weight  181 lb 4 oz (82.2 kg) Documented at 2017 1011          Physical Exam:   General Appearance:    Alert, cooperative, in no acute distress   Lungs:     Clear to auscultation.  Normal respiratory effort and rate.      Heart:    Tachycardia and irregular mechanical S2.  No murmur.   Chest Wall:    No abnormalities observed   Abdomen:     Soft, nontender, positive bowel sounds.     Extremities:   no cyanosis, clubbing or edema.  No marked joint deformities.  Adequate musculoskeletal strength.       Results Review:      Results from last 7 days  Lab Units 17  0544   SODIUM mmol/L 132*   POTASSIUM mmol/L 4.1   CHLORIDE mmol/L 89*   CO2 mmol/L 18.1*   BUN mg/dL 30*   CREATININE mg/dL 5.70*   GLUCOSE mg/dL 159*   CALCIUM mg/dL 9.3           Results from last 7 days  Lab Units 17  0544   WBC 10*3/mm3 9.04   HEMOGLOBIN g/dL 11.7*   HEMATOCRIT % 36.7   PLATELETS 10*3/mm3 200       Results from last 7 days  Lab Units 17  0544 17  0613 17  0556   INR  3.06* 2.33* 2.19*   APTT seconds 38.3* 38.5* 38.8*                     Medication Review:     midodrine 10 mg Oral TID AC   piperacillin-tazobactam 3.375 g Intravenous Q12H "   sevelamer 800 mg Oral TID With Meals   warfarin 5 mg Oral Daily          Pharmacy Consult        Assessment/Plan     1. Shortness of breath/acute respiratory failure. She came to see me for persistent shortness of breath. I recommended a right and left heart catheterization which did not reveal a specific reason for her dyspnea. She had a wedge pressure of 21 with a pulmonary pressure of 55/24. She had no significant coronary artery disease. Transthoracic echocardiogram showed moderately decreased LV function with an ejection fraction of 35% with severe pulmonary hypertension and severe tricuspid regurgitation. This was new when compared to previous evaluation. I suspect it is tachycardia mediated.  2. Atrial fibrillation. Pacemaker check in the office showed that she has persistently tachycardic. She has not been on rate lowering medication because of hypotension. She may benefit from an AV node ablation.Currently, she is not tachycardic. I am going to decrease her dose of warfarin today.  3. Aortic valve replacement. Mechanical. Only one leaflet clearly opening on cath. Not well visualized on echo.   4. She has systolic and diastolic heart failure. Her last echo in April 2017 showed her ejection fraction to be 50%. It is now down to 35%. This could be tachycardia mediated  5. COPD  6. Sleep apnea  7. End stage renal disease on hemodialysis  8. Diabetes  9. History of hypertension though more hypotension recently. She is on Midrin as an outpatient.  10. Hyperlipidemia  11. Ventricular tachycardia. When her pacemaker was checked in my office earlier this month that showed 2 episodes of ventricular tachycardia/ventricular fibrillation with responded to antitachycardia pacing and did not require shock.    She is currently stable.  My plan is for her to see Dr. Degroot when he is back into town and discuss AV node ablation.  She should be okay for discharge soon.  I think she is going to need to stay at  rehabilitation to regain some strength.  Will consult CCP.    Marli Johnson MD, [facility]  07/18/17  9:17 AM

## 2017-07-18 NOTE — PLAN OF CARE
Problem: Patient Care Overview (Adult)  Goal: Plan of Care Review  Outcome: Ongoing (interventions implemented as appropriate)    07/18/17 0128   Coping/Psychosocial Response Interventions   Plan Of Care Reviewed With patient   Patient Care Overview   Progress progress toward functional goals as expected   Outcome Evaluation   Outcome Summary/Follow up Plan Pt transfered from CCU this shift, Rhythm is A-fib with implanted AICD, HC fistula in RUE with strong thrill and bruit, VSS with goal MAP under 55         Problem: Cardiac Catheterization with/without PCI (Adult)  Goal: Signs and Symptoms of Listed Potential Problems Will be Absent or Manageable (Cardiac Catheterization with/without PCI)  Outcome: Ongoing (interventions implemented as appropriate)    Problem: Respiratory Insufficiency (Adult)  Goal: Acid/Base Balance  Outcome: Ongoing (interventions implemented as appropriate)  Goal: Effective Ventilation  Outcome: Ongoing (interventions implemented as appropriate)

## 2017-07-18 NOTE — PLAN OF CARE
Problem: Patient Care Overview (Adult)  Goal: Plan of Care Review  Outcome: Ongoing (interventions implemented as appropriate)    07/18/17 3402   Coping/Psychosocial Response Interventions   Plan Of Care Reviewed With patient   Patient Care Overview   Progress progress towards functional goals is fair   Outcome Evaluation   Outcome Summary/Follow up Plan No falls reported. VSS. Complained of on and off chest pain all day. Troponin level to be re-drawn at 6pm. Patient undergoing diaylsis this afternoon. Will continue to monitor.       Goal: Adult Individualization and Mutuality  Outcome: Ongoing (interventions implemented as appropriate)  Goal: Discharge Needs Assessment  Outcome: Ongoing (interventions implemented as appropriate)    Problem: Cardiac Catheterization with/without PCI (Adult)  Goal: Signs and Symptoms of Listed Potential Problems Will be Absent or Manageable (Cardiac Catheterization with/without PCI)  Outcome: Ongoing (interventions implemented as appropriate)    Problem: Respiratory Insufficiency (Adult)  Goal: Acid/Base Balance  Outcome: Ongoing (interventions implemented as appropriate)  Goal: Effective Ventilation  Outcome: Ongoing (interventions implemented as appropriate)

## 2017-07-18 NOTE — PROGRESS NOTES
"                                              LOS: 6 days   Patient Care Team:  Yamilet Zurita MD as PCP - General (Family Medicine)    Chief Complaint:  Follow-up on respiratory failure, pneumonia, status post cardiac arrest, pulmonary edema    Interval History:   On room air.  She has clear cough.  No dyspnea at rest           Ventilator/Non-Invasive Ventilation Settings     Start     Ordered         Vital Signs  Temp:  [97.5 °F (36.4 °C)-98 °F (36.7 °C)] 97.5 °F (36.4 °C)  Heart Rate:  [] 114  Resp:  [16-20] 16  BP: ()/(50-80) 110/80    Intake/Output Summary (Last 24 hours) at 07/18/17 1948  Last data filed at 07/18/17 1900   Gross per 24 hour   Intake               50 ml   Output             1600 ml   Net            -1550 ml     Flowsheet Rows         First Filed Value    Admission Height  66\" (167.6 cm) Documented at 07/12/2017 1011    Admission Weight  181 lb 4 oz (82.2 kg) Documented at 07/12/2017 1011          Physical Exam:   General Appearance:    Alert, cooperative, in no acute distress   Lungs:     Clear to auscultation,respirations regular, even and                  Unlabored But decreased air entry at the bases     Heart:    Irregular rhythm but normal rate       Abdomen:     Obese, Normal bowel sounds,Soft, no tenderness    Neuro:   Conscious, alert, oriented x3   Extremities:   Moves all extremities well, trace edema, no cyanosis, no             Redness          Results Review:          Results from last 7 days  Lab Units 07/18/17  1219 07/18/17  0544 07/17/17  0613 07/16/17  0952   SODIUM mmol/L  --  132* 136 136   POTASSIUM mmol/L 4.4 4.1 4.6 4.1   CHLORIDE mmol/L  --  89* 93* 95*   CO2 mmol/L  --  18.1* 20.7* 21.2*   BUN mg/dL  --  30* 44* 37*   CREATININE mg/dL  --  5.70* 7.24* 6.32*   GLUCOSE mg/dL  --  159* 141* 159*   CALCIUM mg/dL  --  9.3 9.4 9.1       Results from last 7 days  Lab Units 07/18/17  1219   TROPONIN T ng/mL 0.933*       Results from last 7 days  Lab Units " 07/18/17  0544 07/17/17  0613 07/16/17  0556   WBC 10*3/mm3 9.04 8.59 10.32   HEMOGLOBIN g/dL 11.7* 11.1* 11.4*   HEMATOCRIT % 36.7 34.2* 35.5*   PLATELETS 10*3/mm3 200 211 214       Results from last 7 days  Lab Units 07/18/17  0544 07/17/17  0613 07/16/17  0556   INR  3.06* 2.33* 2.19*   APTT seconds 38.3* 38.5* 38.8*         @LABRCNT(bnp)@  I reviewed the patient's new clinical results.  I personally viewed and interpreted the patient's CXR        Medication Review:     midodrine 10 mg Oral TID AC   piperacillin-tazobactam 3.375 g Intravenous Q12H   sevelamer 800 mg Oral TID With Meals   warfarin 2.5 mg Oral Daily         Pharmacy Consult        Diagnostic imaging:  I personally and independently reviewed the Following images:  Pulmonary edema, slightly worse,Possibly after removal of positive pressure ventilation.  There is trace pleural effusion on the left.         ASSESSMENT:   1) Cardiopulmonary arrest  2) Respiratory failure, liberated off the vent 7/13  3) Aspiration pneumonia: Sputum culture 7/12 --> Few WBC, mixed nuno  4) Pulmonary edema  5) Hypotensive cardiogenic shock, resolved. Now with persistent hypotension which is at baseline- Stim test does not suggest adrenal insufficiency  6) Acute on chronic CHF with an ejection fraction of 35%  7) End-stage renal disease: Follows with Dr. Concepcion  8) Obstructive sleep apnea  9) COPD, no exacerbation  10) Chronic atrial fibrillation  11) History of valvular heart disease with aortic valve replacement  12) Metabolic acidosis   13) Mild hypokalemia  14) Mild anemia  15) Mild hemoptysis, 2ary to #3, edema and elevated INR  16) Elevated INR 2ary to Coumadin treatment    Plan:   -Stop Zocyn after today's dose.   -F/up as OP with Dr. Kinsey for sleep apnea. She stated that she has just received a CPAP. It's important to use it in the setting of heart disease.   -Rest of plan per cardiology.     Will follow as needed. Thank you      Fer Abarca MD  07/18/17  7:48  AURELIANO Leggett/Transcription disclaimer:   Much of this encounter note is an electronic transcription/translation of spoken language to printed text. The electronic translation of spoken language may permit erroneous, or at times, nonsensical words or phrases to be inadvertently transcribed; Although I have reviewed the note for such errors, some may still exist.

## 2017-07-18 NOTE — NURSING NOTE
Spoke to Jaci about the on and off chest pain again as well as the SOA. Per Dr. Johnson, does not think the pain is cardiac related. No orders given at this time.

## 2017-07-19 NOTE — THERAPY EVALUATION
Acute Care - Physical Therapy Initial Evaluation  [facility]     Patient Name: Keisha Nelson  : 1945  MRN: [id number]  Today's Date: 2017   Onset of Illness/Injury or Date of Surgery Date: 17            Admit Date: 2017     Visit Dx:    ICD-10-CM ICD-9-CM   1. Difficulty walking R26.2 719.7   2. Shortness of breath R06.02 786.05   3. Pulmonary hypertension I27.2 416.8   4. Chronic diastolic congestive heart failure I50.32 428.32     428.0     Patient Active Problem List   Diagnosis   • Benign essential hypertension   • Congestive heart failure   • Chronic kidney disease, stage IV (severe)   • Chronic obstructive pulmonary disease   • Primary hypertrophic cardiomyopathy   • History of aortic valve replacement   • Imbalance   • End stage renal failure on dialysis   • Type 2 diabetes mellitus   • Chronic a-fib   • Dizziness   • Bleeding internal hemorrhoids   • Hematochezia   • Systemic infection   • Calciphylaxis   • Cellulitis of lower extremity   • Centrilobular emphysema   • Chronic diastolic heart failure   • Chronic obstructive bronchitis   • Multiple-type hyperlipidemia   • Dependence on renal dialysis   • Depression   • Diabetic hypoglycemia   • Diabetic peripheral neuropathy   • Edema   • History of prosthetic heart valve   • Long term current use of anticoagulant   • Long term current use of insulin   • Major depressive disorder with single episode   • Nonsustained ventricular tachycardia   • Obstructive sleep apnea syndrome   • Periumbilic abdominal tenderness   • Pulmonary hypertension   • Automatic implantable cardioverter-defibrillator in situ   • Skin ulcer   • Shortness of breath   • Chronic fatigue   • Cardiogenic shock   • Acute respiratory failure with hypoxia   • Pulmonary edema   • Hypocalcemia   • Metabolic acidosis     Past Medical History:   Diagnosis Date   • Acute diastolic CHF (congestive heart failure)    • Anxiety    • Asthma    • Atrial fibrillation    • COPD  (chronic obstructive pulmonary disease)    • Depression    • Diabetes mellitus    • ESRD (end stage renal disease)    • Hyperlipidemia    • Hypertension    • Long term current use of anticoagulant therapy    • Pulmonary hypertension    • Sleep apnea    • Tricuspid regurgitation      Past Surgical History:   Procedure Laterality Date   • AORTIC VALVE REPAIR/REPLACEMENT     • CARDIAC CATHETERIZATION N/A 7/12/2017    Procedure: Right and Left Heart Cath;  Surgeon: Rizwan Saldaña MD;  Location:  [facility];  Service:    • CARDIAC CATHETERIZATION N/A 7/12/2017    Procedure: Left ventriculography;  Surgeon: Rizwan Saldaña MD;  Location:  [facility];  Service:    • CARDIAC CATHETERIZATION N/A 7/12/2017    Procedure: Aortic root aortogram;  Surgeon: Rizwan Saldaña MD;  Location:  [facility];  Service:    • CARDIAC DEFIBRILLATOR PLACEMENT Left    • DIALYSIS FISTULA CREATION Right    • LAPAROSCOPIC CHOLECYSTECTOMY     • PARTIAL HYSTERECTOMY            PT ASSESSMENT (last 72 hours)      PT Evaluation       07/19/17 1340 07/18/17 1318    Rehab Evaluation    Document Type evaluation  -CH     Subjective Information agree to therapy  -CH     Evaluation Not Performed  other (see comments)   YEFRI Loera asked for PT to hold for today as patient is having chest pain. PT will check back tomorrow.  -CH    Patient Effort, Rehab Treatment good  -CH     Symptoms Noted During/After Treatment none  -CH     General Information    Onset of Illness/Injury or Date of Surgery Date 07/12/17  -CH     General Observations sitting in chair, no acute distress noted at rest  -CH     Pertinent History Of Current Problem Pt admitted after coding during cardiac cath, had respiratory failure and PNA  -CH     Precautions/Limitations fall precautions  -CH     Prior Level of Function independent:;gait;transfer;bed mobility;ADL's   walks with a rollator  -CH     Equipment Currently Used at Home walker,  rolling  -     Plans/Goals Discussed With patient  -     Barriers to Rehab medically complex  -     Clinical Impression    Patient/Family Goals Statement to return to [facility]  -     Criteria for Skilled Therapeutic Interventions Met treatment indicated  -     Impairments Found (describe specific impairments) gait, locomotion, and balance;muscle performance  -     Rehab Potential good, to achieve stated therapy goals  -     Vital Signs    Intratreatment Heart Rate (beats/min) 150  -     Posttreatment Heart Rate (beats/min) 112  -     Pain Assessment    Pain Assessment No/denies pain  -     Cognitive Assessment/Intervention    Current Cognitive/Communication Assessment functional  -     Orientation Status oriented x 4  -     Follows Commands/Answers Questions 100% of the time  -     Personal Safety WNL/WFL  -     Personal Safety Interventions fall prevention program maintained;gait belt;nonskid shoes/slippers when out of bed  -     ROM (Range of Motion)    General ROM no range of motion deficits identified  -     MMT (Manual Muscle Testing)    General MMT Assessment other (see comments)   generalized weakness noted with functional mobility  -     Bed Mobility, Assessment/Treatment    Bed Mob, Supine to Sit, [place] not tested   sitting in chair  -     Bed Mob, Sit to Supine, [place] verbal cues required;nonverbal cues required (demo/gesture);minimum assist (75% patient effort)  -     Transfer Assessment/Treatment    Transfers, Sit-Stand [place] verbal cues required;nonverbal cues required (demo/gesture);minimum assist (75% patient effort)  -     Transfers, Stand-Sit [place] verbal cues required;nonverbal cues required (demo/gesture);minimum assist (75% patient effort)  -     Transfers, Sit-Stand-Sit, Assist Device rolling walker  -     Gait Assessment/Treatment    Gait, [place] Level verbal cues required;nonverbal cues required (demo/gesture);contact  guard assist  -     Gait, Assistive Device rolling walker  -     Gait, Distance (Feet) 15  -     Gait, Gait Deviations mann decreased;forward flexed posture;step length decreased;stride length decreased  -     Gait, Safety Issues step length decreased  -     Gait, Comment pt very quickly and suddenly became fatigued stating that she needed to sit down, so pt sat on the bed  -     Motor Skills/Interventions    Additional Documentation Balance Skills Training (Group)  -     Balance Skills Training    Standing-Level of Assistance Contact guard  -     Static Standing Balance Support assistive device  -     Gait Balance-Level of Assistance Contact guard  -     Gait Balance Support assistive device  -     Positioning and Restraints    Pre-Treatment Position sitting in chair/recliner  -     Post Treatment Position bed  -     In Bed supine;call light within reach;encouraged to call for assist;exit alarm on  -       07/17/17 1136       Rehab Evaluation    Evaluation Not Performed patient unavailable for evaluation   pt just started dialysis, will follow up tomorrow  -PC       User Key  (r) = Recorded By, (t) = Taken By, (c) = Cosigned By    Initials Name Provider Type     Khloe Mahajan, PT Physical Therapist    PC Jasmine Ferguson PT Physical Therapist          Physical Therapy Education     Title: PT OT SLP Therapies (Active)     Topic: Physical Therapy (Active)     Point: Mobility training (Done)    Learning Progress Summary    Learner Readiness Method Response Comment Documented by Status   Patient Acceptance MAISHA LR D VU,LACEY   07/19/17 1455 Done               Point: Body mechanics (Done)    Learning Progress Summary    Learner Readiness Method Response Comment Documented by Status   Patient Acceptance MAISHA LR D VU, NR   07/19/17 1455 Done    Acceptance MAISHA LR D VU, DU, NR   07/18/17 0135 Done               Point: Precautions (Done)    Learning Progress Summary    Learner Readiness Method  Response Comment Documented by Status   Patient Acceptance E,TB,D VU,NR   07/19/17 1455 Done                      User Key     Initials Effective Dates Name Provider Type Discipline     12/01/15 -  Khloe Mahajan, DONALD Physical Therapist PT     06/23/17 -  Alex Phillips RN Registered Nurse Nurse                PT Recommendation and Plan  Anticipated Discharge Disposition: skilled nursing facility  Planned Therapy Interventions: balance training, bed mobility training, gait training, home exercise program, patient/family education, transfer training  PT Frequency: daily  Plan of Care Review  Plan Of Care Reviewed With: patient  Outcome Summary/Follow up Plan: Pt presents with impaired functional mobility and gait secondary to generalized weakness, impaired balalnce, and decreased activity tolerance s/p respiratory failure. Pt required assistance with mobilty and transfers, and suddenly became fatigue with gait. Pt may benefit from skilled PT to address strength, mobilty and activity tolerance.          IP PT Goals       07/19/17 1455          Bed Mobility PT LTG    Bed Mobility PT LTG, Time to Achieve 1 wk  -CH      Bed Mobility PT LTG, Activity Type all bed mobility  -CH      Bed Mobility PT LTG, [place] Level supervision required  -CH      Transfer Training PT LTG    Transfer Training PT LTG, Time to Achieve 1 wk  -CH      Transfer Training PT LTG, Activity Type all transfers  -CH      Transfer Training PT LTG, [place] Level supervision required  -CH      Transfer Training PT LTG, Assist Device walker, rolling  -CH      Gait Training PT LTG    Gait Training Goal PT LTG, Time to Achieve 1 wk  -CH      Gait Training Goal PT LTG, [place] Level supervision required  -CH      Gait Training Goal PT LTG, Assist Device walker, rolling  -CH      Gait Training Goal PT LTG, Distance to Achieve 100  -CH        User Key  (r) = Recorded By, (t) = Taken By, (c) = Cosigned By    Initials Name Provider Type     TIN Mahajan, PT Physical Therapist                Outcome Measures       07/19/17 1400          How much help from another person do you currently need...    Turning from your back to your side while in flat bed without using bedrails? 3  -CH      Moving from lying on back to sitting on the side of a flat bed without bedrails? 3  -CH      Moving to and from a bed to a chair (including a wheelchair)? 3  -CH      Standing up from a chair using your arms (e.g., wheelchair, bedside chair)? 3  -CH      Climbing 3-5 steps with a railing? 1  -CH      To walk in hospital room? 2  -CH      AM-PAC 6 Clicks Score 15  -CH      Functional Assessment    Outcome Measure Options AM-PAC 6 Clicks Basic Mobility (PT)  -        User Key  (r) = Recorded By, (t) = Taken By, (c) = Cosigned By    Initials Name Provider Type     Khloe Mahajan PT Physical Therapist           Time Calculation:         PT Charges       07/19/17 1458 07/19/17 1357       Time Calculation    Start Time 1330  -CH      Stop Time 1340  -      Time Calculation (min) 10 min  -      PT Received On 07/19/17  -      PT - Next Appointment 07/20/17  - 07/20/17  -     PT Goal Re-Cert Due Date 07/26/17  -        User Key  (r) = Recorded By, (t) = Taken By, (c) = Cosigned By    Initials Name Provider Type     Khloe Mahajan PT Physical Therapist          Therapy Charges for Today     Code Description Service Date Service Provider Modifiers Qty    30177196657 HC PT EVAL MOD COMPLEXITY 2 7/19/2017 Khloe Mahajan, PT GP 1    07703853614 HC PT THER PROC EA 15 MIN 7/19/2017 Khloe Mahajan, PT GP 1          PT G-Codes  Outcome Measure Options: AM-PAC 6 Clicks Basic Mobility (PT)      Khloe Mahajan PT  7/19/2017

## 2017-07-19 NOTE — PROGRESS NOTES
"Patient Name: Keisha Nelson  :1945  72 y.o.      Patient Care Team:  Yamilet Zurita MD as PCP - General (Family Medicine)    Interval History:   Complaining of chest pain    Subjective:  Following for cardiomyopathy and mechanical valve    Objective   Vital Signs  Temp:  [97.3 °F (36.3 °C)-97.6 °F (36.4 °C)] 97.3 °F (36.3 °C)  Heart Rate:  [102-114] 109  Resp:  [16-20] 18  BP: ()/(43-80) 96/43    Intake/Output Summary (Last 24 hours) at 17 0900  Last data filed at 17 1900   Gross per 24 hour   Intake                0 ml   Output             1600 ml   Net            -1600 ml     Flowsheet Rows         First Filed Value    Admission Height  66\" (167.6 cm) Documented at 2017 1011    Admission Weight  181 lb 4 oz (82.2 kg) Documented at 2017 1011          Physical Exam:   General Appearance:    Alert, cooperative, in no acute distress   Lungs:     Clear to auscultation.  Normal respiratory effort and rate.      Heart:    Tachycardic.  Mechanical S2     Chest Wall:    No abnormalities observed   Abdomen:     Soft, nontender, positive bowel sounds.     Extremities:   no cyanosis, clubbing or edema.  No marked joint deformities.  Adequate musculoskeletal strength.       Results Review:      Results from last 7 days  Lab Units 17  0539   SODIUM mmol/L 132*   POTASSIUM mmol/L 3.8   CHLORIDE mmol/L 89*   CO2 mmol/L 20.6*   BUN mg/dL 20   CREATININE mg/dL 4.37*   GLUCOSE mg/dL 104*   CALCIUM mg/dL 9.5       Results from last 7 days  Lab Units 17  1925 17  1219   TROPONIN T ng/mL 0.929* 0.933*       Results from last 7 days  Lab Units 17  0539   WBC 10*3/mm3 8.98   HEMOGLOBIN g/dL 11.2*   HEMATOCRIT % 34.7*   PLATELETS 10*3/mm3 205       Results from last 7 days  Lab Units 17  0539 17  0544 17  0613   INR  3.57* 3.06* 2.33*   APTT seconds 43.9* 38.3* 38.5*           Results from last 7 days  Lab Units 17  1219   MAGNESIUM mg/dL 2.5*       "       Medication Review:     midodrine 10 mg Oral TID AC   sevelamer 800 mg Oral TID With Meals   warfarin 2.5 mg Oral Daily             Assessment/Plan     1. Shortness of breath/acute respiratory failure. She came to see me for persistent shortness of breath. I recommended a right and left heart catheterization which did not reveal a specific reason for her dyspnea. She had a wedge pressure of 21 with a pulmonary pressure of 55/24. She had no significant coronary artery disease. Transthoracic echocardiogram showed moderately decreased LV function with an ejection fraction of 35% with severe pulmonary hypertension and severe tricuspid regurgitation. This was new when compared to previous evaluation. I suspect it is tachycardia mediated.  2. Atrial fibrillation. Pacemaker check in the office showed that she has persistently tachycardic. She has not been on rate lowering medication because of hypotension. She may benefit from an AV node ablation. Decrease warfarin.  3. Aortic valve replacement. Mechanical. Only one leaflet clearly opening on cath. Not well visualized on echo.   4. She has systolic and diastolic heart failure. Her last echo in April 2017 showed her ejection fraction to be 50%. It is now down to 35%. This could be tachycardia mediated  5. COPD  6. Sleep apnea  7. End stage renal disease on hemodialysis  8. Diabetes  9. History of hypertension though more hypotension recently. She is on Midrin as an outpatient.  10. Hyperlipidemia  11. Ventricular tachycardia. When her pacemaker was checked in my office earlier this month that showed 2 episodes of ventricular tachycardia/ventricular fibrillation with responded to antitachycardia pacing and did not require shock.  12.  Chest pain.  This is consistent with musculoskeletal chest pain which would be consistent with her being coded.  Troponin elevated due to renal failure.  Not climbing.    - decrease warfarin  - Discharge planning.  Hope to discharge  tomorrow.  - Follow up with Dr. Degroot regarding AV node ablation.    Marli Johnson MD, [facility]  07/19/17  9:00 AM

## 2017-07-19 NOTE — PROGRESS NOTES
"   LOS: 7 days   Patient Care Team:  Yamilet Zurita MD as PCP - General (Family Medicine)    Chief Complaint/ Reason for encounter: End-stage renal disease, dialysis management        Subjective     History of Present Illness    Subjective:  Symptoms:  Stable.  She reports weakness.  No shortness of breath or chest pain.  (No new complaints  Doing well,   had dialysis yesterday for additional UF but declined treatment today  No shortness of breath or chest pain today).    Diet:  Poor intake.  No nausea.    Activity level: Returning to normal.    Pain:  She reports no pain.          History taken from: Patient and chart    Objective     Vital Signs  Temp:  [97.3 °F (36.3 °C)-97.7 °F (36.5 °C)] 97.7 °F (36.5 °C)  Heart Rate:  [109-114] 114  Resp:  [16-18] 16  BP: ()/(43-80) 102/58       Wt Readings from Last 1 Encounters:   07/19/17 0500 179 lb 9 oz (81.4 kg)   07/18/17 0509 186 lb 5 oz (84.5 kg)   07/17/17 0645 186 lb 4.6 oz (84.5 kg)   07/15/17 0651 184 lb 1.4 oz (83.5 kg)   07/14/17 0500 187 lb 2.7 oz (84.9 kg)   07/13/17 0400 188 lb 11.4 oz (85.6 kg)   07/12/17 1345 185 lb 6.5 oz (84.1 kg)   07/12/17 1011 181 lb 4 oz (82.2 kg)       Objective:  General Appearance:  Comfortable, in no acute distress, not in pain and ill-appearing (Intubated and sedated on the ventilator).    Vital signs: (most recent): Blood pressure 102/58, pulse 114, temperature 97.7 °F (36.5 °C), temperature source Oral, resp. rate 16, height 66\" (167.6 cm), weight 179 lb 9 oz (81.4 kg), SpO2 97 %.  Vital signs are normal.  No fever.    Output: Minimal urine output.    HEENT: Normal HEENT exam.    Lungs:  Normal respiratory rate and normal effort.  She is not in respiratory distress.  There are decreased breath sounds.  No wheezes, rales or rhonchi.    Heart: Normal rate.  Regular rhythm.  S1 normal and S2 normal.  No murmur.   Abdomen: Abdomen is soft and non-distended.  Bowel sounds are normal.   There is no abdominal tenderness.  " There is no epigastric area or no suprapubic area tenderness.  There is no rebound tenderness.     Extremities: Normal range of motion.  There is no deformity or dependent edema.    Pulses: Distal pulses are intact.    Pupils:  Pupils are equal, round, and reactive to light.    Skin:  Warm and dry.  No rash or cyanosis.             Results Review:    Past Medical History: reviewed and updated  Past Medical History:   Diagnosis Date   • Acute diastolic CHF (congestive heart failure)    • Anxiety    • Asthma    • Atrial fibrillation    • COPD (chronic obstructive pulmonary disease)    • Depression    • Diabetes mellitus    • ESRD (end stage renal disease)    • Hyperlipidemia    • Hypertension    • Long term current use of anticoagulant therapy    • Pulmonary hypertension    • Sleep apnea    • Tricuspid regurgitation          Allergies:  No Known Allergies    Intake/Output:     Intake/Output Summary (Last 24 hours) at 07/19/17 1645  Last data filed at 07/19/17 1326   Gross per 24 hour   Intake              360 ml   Output             1600 ml   Net            -1240 ml         DATA:  Interval chart, labs and notes reviewed.  The following labs independently reviewed by me    Labs:   Recent Results (from the past 24 hour(s))   Troponin    Collection Time: 07/18/17  7:25 PM   Result Value Ref Range    Troponin T 0.929 (C) 0.000 - 0.030 ng/mL   Protime-INR    Collection Time: 07/19/17  5:39 AM   Result Value Ref Range    Protime 34.6 (H) 11.7 - 14.2 Seconds    INR 3.57 (H) 0.90 - 1.10   aPTT    Collection Time: 07/19/17  5:39 AM   Result Value Ref Range    PTT 43.9 (H) 22.7 - 35.4 seconds   Renal Function Panel    Collection Time: 07/19/17  5:39 AM   Result Value Ref Range    Glucose 104 (H) 65 - 99 mg/dL    BUN 20 8 - 23 mg/dL    Creatinine 4.37 (H) 0.57 - 1.00 mg/dL    Sodium 132 (L) 136 - 145 mmol/L    Potassium 3.8 3.5 - 5.2 mmol/L    Chloride 89 (L) 98 - 107 mmol/L    CO2 20.6 (L) 22.0 - 29.0 mmol/L    Calcium 9.5 8.6  - 10.5 mg/dL    Albumin 3.60 3.50 - 5.20 g/dL    Phosphorus 4.4 2.5 - 4.5 mg/dL    Anion Gap 22.4 mmol/L    BUN/Creatinine Ratio 4.6 (L) 7.0 - 25.0    eGFR  African Amer 12 (L) >60 mL/min/1.73   CBC Auto Differential    Collection Time: 07/19/17  5:39 AM   Result Value Ref Range    WBC 8.98 4.50 - 10.70 10*3/mm3    RBC 3.71 (L) 3.90 - 5.20 10*6/mm3    Hemoglobin 11.2 (L) 11.9 - 15.5 g/dL    Hematocrit 34.7 (L) 35.6 - 45.5 %    MCV 93.5 80.5 - 98.2 fL    MCH 30.2 26.9 - 32.0 pg    MCHC 32.3 (L) 32.4 - 36.3 g/dL    RDW 19.6 (H) 11.7 - 13.0 %    RDW-SD 60.3 (H) 37.0 - 54.0 fl    MPV 10.3 6.0 - 12.0 fL    Platelets 205 140 - 500 10*3/mm3   Scan Slide    Collection Time: 07/19/17  5:39 AM   Result Value Ref Range    Scan Slide     Manual Differential    Collection Time: 07/19/17  5:39 AM   Result Value Ref Range    Neutrophil % 76.0 42.7 - 76.0 %    Lymphocyte % 18.0 (L) 19.6 - 45.3 %    Monocyte % 4.0 (L) 5.0 - 12.0 %    Eosinophil % 1.0 0.3 - 6.2 %    Myelocyte % 1.0 (H) 0.0 - 0.0 %    Neutrophils Absolute 6.82 1.90 - 8.10 10*3/mm3    Lymphocytes Absolute 1.62 0.90 - 4.80 10*3/mm3    Monocytes Absolute 0.36 0.20 - 1.20 10*3/mm3    Eosinophils Absolute 0.09 0.00 - 0.70 10*3/mm3    nRBC 6.0 (H) 0.0 - 0.0 /100 WBC    Anisocytosis Large/3+ None Seen    Crenated RBC's Slight/1+ None Seen    Poikilocytes Slight/1+ None Seen    Polychromasia Slight/1+ None Seen    Spherocytes Slight/1+ None Seen    Hypersegmented Neutrophils Slight/1+ None Seen    Platelet Morphology Normal Normal       Radiology:  Imaging Results (last 24 hours)     ** No results found for the last 24 hours. **             Medications have been reviewed:  Current Facility-Administered Medications   Medication Dose Route Frequency Provider Last Rate Last Dose   • acetaminophen (TYLENOL) tablet 650 mg  650 mg Oral Q6H PRN Feliberto Kinsey MD   650 mg at 07/18/17 1310   • alteplase ((CATHFLO/ACTIVASE)) injection 2 mg  2 mg Intravenous Once Rizwan Saldaña MD        • fentaNYL citrate (PF) (SUBLIMAZE) injection 25 mcg  25 mcg Intravenous Q2H PRN Feliberto Kinsey MD   25 mcg at 07/17/17 0107   • Glycerin-Hypromellose- (ARTIFICIAL TEARS) 0.2-0.2-1 % ophthalmic solution solution 2 drop  2 drop Both Eyes Q3H PRN Norman Arizmendi MD   2 drop at 07/15/17 1408   • loperamide (IMODIUM) capsule 2 mg  2 mg Oral 4x Daily PRN Fer Abarca MD   2 mg at 07/16/17 1703   • mannitol 25% (RENAL) injection  25 g Intravenous PRN Veto Gray MD   25 g at 07/17/17 1139   • midodrine (PROAMATINE) tablet 10 mg  10 mg Oral TID AC Toni Oconnell MD   10 mg at 07/19/17 1233   • nitroglycerin (NITROSTAT) SL tablet 0.4 mg  0.4 mg Sublingual Q5 Min PRN Rizwan Saldaña MD   0.4 mg at 07/19/17 0814   • sevelamer (RENVELA) tablet 800 mg  800 mg Oral TID With Meals Laura Frances MD   800 mg at 07/19/17 1233   • warfarin (COUMADIN) tablet 1 mg  1 mg Oral Daily Marli Johnson MD         Facility-Administered Medications Ordered in Other Encounters   Medication Dose Route Frequency Provider Last Rate Last Dose   • Propofol (DIPRIVAN) injection    PRN Francesco Johnson MD   100 mg at 07/12/17 1155   • succinylcholine (ANECTINE) injection   Intravenous PRN Francesco Johnson MD   100 mg at 07/12/17 1155       Assessment/Plan     Active Problems:    Shortness of breath    Cardiogenic shock    Acute respiratory failure with hypoxia    Pulmonary edema    Hypocalcemia    Metabolic acidosis      Assessment:  (End-stage renal disease.  Status post cardiac arrest  Cardiogenic shock, improved  Acute respiratory failure, much improved  Hypokalemia postdialysis, improved    Hypotension status post code  Metabolic acidosis, improved  Congestive heart failure  Type 2 diabetes mellitus   hypocalcemia- improved        Plan:   Will plan dialysis tomorrow and eventually back to her usual Monday Wednesday Friday schedule  Ultrafiltration with dialysis as tolerated  continue oral midodrine  Continue  phosphorus binders  Renal dose antibiotics for GFR less than 10).             Continue to monitor renal function, electroytes and volume closely   Please call me with any questions or concerns      Toni Oconnell MD   [facility]   [phone]    07/19/17  4:45 PM      Dictation performed using Dragon dictation software

## 2017-07-19 NOTE — PLAN OF CARE
Problem: Patient Care Overview (Adult)  Goal: Plan of Care Review  Outcome: Ongoing (interventions implemented as appropriate)    07/19/17 1455   Coping/Psychosocial Response Interventions   Plan Of Care Reviewed With patient   Outcome Evaluation   Outcome Summary/Follow up Plan Pt presents with impaired functional mobility and gait secondary to generalized weakness, impaired balalnce, and decreased activity tolerance s/p respiratory failure. Pt required assistance with mobilty and transfers, and suddenly became fatigue with gait. Pt may benefit from skilled PT to address strength, mobilty and activity tolerance.         Problem: Inpatient Physical Therapy  Goal: Bed Mobility Goal LTG- PT  Outcome: Ongoing (interventions implemented as appropriate)    07/19/17 1455   Bed Mobility PT LTG   Bed Mobility PT LTG, Time to Achieve 1 wk   Bed Mobility PT LTG, Activity Type all bed mobility   Bed Mobility PT LTG, [place] Level supervision required       Goal: Transfer Training Goal 1 LTG- PT  Outcome: Ongoing (interventions implemented as appropriate)    07/19/17 1455   Transfer Training PT LTG   Transfer Training PT LTG, Time to Achieve 1 wk   Transfer Training PT LTG, Activity Type all transfers   Transfer Training PT LTG, [place] Level supervision required   Transfer Training PT LTG, Assist Device walker, rolling       Goal: Gait Training Goal LTG- PT  Outcome: Ongoing (interventions implemented as appropriate)    07/19/17 1455   Gait Training PT LTG   Gait Training Goal PT LTG, Time to Achieve 1 wk   Gait Training Goal PT LTG, [place] Level supervision required   Gait Training Goal PT LTG, Assist Device walker, rolling   Gait Training Goal PT LTG, Distance to Achieve 100

## 2017-07-19 NOTE — PLAN OF CARE
Problem: Patient Care Overview (Adult)  Goal: Plan of Care Review  Outcome: Ongoing (interventions implemented as appropriate)    07/19/17 0118   Patient Care Overview   Progress progress toward functional goals as expected   Outcome Evaluation   Outcome Summary/Follow up Plan Pt returned from HD,troponin redraw with elevated critical result called to Dr. Cramer, Pt remains on 2LNC, Up to Chair with standby assist, Sleeping comfortably in Bed most of the night         Problem: Cardiac Catheterization with/without PCI (Adult)  Goal: Signs and Symptoms of Listed Potential Problems Will be Absent or Manageable (Cardiac Catheterization with/without PCI)  Outcome: Ongoing (interventions implemented as appropriate)    Problem: Respiratory Insufficiency (Adult)  Goal: Acid/Base Balance  Outcome: Ongoing (interventions implemented as appropriate)  Goal: Effective Ventilation  Outcome: Ongoing (interventions implemented as appropriate)

## 2017-07-19 NOTE — PLAN OF CARE
Problem: Cardiac Catheterization with/without PCI (Adult)  Goal: Signs and Symptoms of Listed Potential Problems Will be Absent or Manageable (Cardiac Catheterization with/without PCI)    07/19/17 1817   Cardiac Catheterization with/without PCI   Problems Assessed (Cardiac Catheterization) all   Problems Present (Cardiac Catheterization) situational response         Problem: Respiratory Insufficiency (Adult)  Intervention: Provide Oxygenation/Ventilation/Perfusion Support    07/18/17 2000 07/19/17 0118 07/19/17 0816   Positioning   Head Of Bed (HOB) Position HOB at 30 degrees --  --    Activity   Activity Type --  --  --    Safety Interventions   Medication Review/Management --  --  medications reviewed   Respiratory Interventions   Airway/Ventilation Management --  airway patency maintained --      07/19/17 1327   Positioning   Head Of Bed (HOB) Position --    Activity   Activity Type up in chair   Safety Interventions   Medication Review/Management --    Respiratory Interventions   Airway/Ventilation Management --          Goal: Acid/Base Balance  Outcome: Ongoing (interventions implemented as appropriate)    07/19/17 1817   Respiratory Insufficiency (Adult)   Acid/Base Balance making progress toward outcome       Goal: Effective Ventilation  Outcome: Ongoing (interventions implemented as appropriate)    07/19/17 1817   Respiratory Insufficiency (Adult)   Effective Ventilation making progress toward outcome

## 2017-07-19 NOTE — PROGRESS NOTES
Continued Stay Note  [facility]     Patient Name: Keisha Nelson  MRN: [id number]  Today's Date: 7/19/2017    Admit Date: 7/12/2017          Discharge Plan       07/19/17 1715    Case Management/Social Work Plan    Additional Comments Pt  did not want to go to rehab this am, but after getting up with PT and not walking far, pt is now in agreement to go to rehab.       07/19/17 1714    Case Management/Social Work Plan    Plan Plans are to skilled care @ [facility], they have a HD chair time and can accept pt.       07/19/17 1631    Case Management/Social Work Plan    Plan Plans are to skilled rehab @ [facility] // [facility] needs to confirm a chair time for HD.  CCP will follow.               Discharge Codes     None        Expected Discharge Date and Time     Expected Discharge Date Expected Discharge Time    Jul 20, 2017             Savanna Maloney RN

## 2017-07-20 NOTE — PROGRESS NOTES
Patient was seen on hemodialysis  Treatment orders discussed with the dialysis [facility]  Blood pressure: 117/57, pulse 73  Blood flow rate/Dialysate flow rate: 350/500   Potassium bath/Fluid removal goal:  3K/2 kg   Arterial pressure/ Venous pressure: 240/140    Patient is tolerating dialysis well and is stable for discharge  I recommend she go to her usual outpatient dialysis facility tomorrow to continue her usual Monday Wednesday Friday schedule  Otherwise stable for discharge later today from a renal standpoint      Toni Oconnell M.D.  [facility]  [phone]

## 2017-07-20 NOTE — DISCHARGE SUMMARY
Patient Name: Keisha Nelson  :1945  72 y.o.    Date of Admit: 2017  Date of Discharge:  2017    Discharge Diagnosis:  Problem List Items Addressed This Visit        Cardiovascular and Mediastinum    Pulmonary hypertension    Relevant Orders    Cardiac Catheterization/Vascular Study (Completed)       Respiratory    Shortness of breath    Relevant Orders    Cardiac Catheterization/Vascular Study (Completed)      Other Visit Diagnoses     Chronic diastolic congestive heart failure        Relevant Medications    midodrine (PROAMATINE) 5 MG tablet    midodrine (PROAMATINE) 10 MG tablet    Other Relevant Orders    Cardiac Catheterization/Vascular Study (Completed)          Hospital Course:        1. Shortness of breath/acute respiratory failure. She came to see me for persistent shortness of breath. I recommended a right and left heart catheterization which did not reveal a specific reason for her dyspnea. She had a wedge pressure of 21 with a pulmonary pressure of 55/24. She had no significant coronary artery disease. Transthoracic echocardiogram showed moderately decreased LV function with an ejection fraction of 35% with severe pulmonary hypertension and severe tricuspid regurgitation. This was new when compared to previous evaluation. I suspect it is tachycardia mediated. She did have respiratory distress and code after her heart catheterization.  She required mechanical ventilation.  She was able to come off of the ventilator and is down to stable.  2. Atrial fibrillation. Pacemaker check in the office showed that she has persistently tachycardic. She has not been on rate lowering medication because of hypotension.  I believe she needs an AV node ablation.  She will follow-up with Dr. Degroot next week to discuss this with him.  3. Aortic valve replacement. Mechanical. Only one leaflet clearly opening on cath. Not well visualized on echo or heart catheter.  4. She has systolic and diastolic heart  failure. Her last echo in April 2017 showed her ejection fraction to be 50%. It is now down to 35%. This could be tachycardia mediated  5. COPD  6. Sleep apnea  7. End stage renal disease on hemodialysis  8. Diabetes  9. History of hypertension though more hypotension recently.  Continue Midrin at higher dose.  10. Hyperlipidemia  11. Ventricular tachycardia. When her pacemaker was checked in my office earlier this month that showed 2 episodes of ventricular tachycardia/ventricular fibrillation with responded to antitachycardia pacing and did not require shock.  12.  Chest pain.  This is consistent with musculoskeletal chest pain which would be consistent with her being coded.  Troponin elevated due to renal failure.  Not climbing.     I am okay with her going to rehabilitation today.  Her INR did jump up.  I will discharge her off of warfarin.  Repeat INR on Monday and resume warfarin as indicated.      Procedures Performed  Procedure(s):  Right and Left Heart Cath  Left ventriculography  Aortic root aortogram       Pertinent Test Results:     Results from last 7 days  Lab Units 07/20/17  0331   SODIUM mmol/L 132*   POTASSIUM mmol/L 4.1   CHLORIDE mmol/L 89*   CO2 mmol/L 16.8*   BUN mg/dL 27*   CREATININE mg/dL 5.35*   CALCIUM mg/dL 9.2   GLUCOSE mg/dL 107*       Results from last 7 days  Lab Units 07/18/17  1925 07/18/17  1219   TROPONIN T ng/mL 0.929* 0.933*       Results from last 7 days  Lab Units 07/20/17  0331   WBC 10*3/mm3 8.05   HEMOGLOBIN g/dL 11.6*   HEMATOCRIT % 36.8   PLATELETS 10*3/mm3 187       Results from last 7 days  Lab Units 07/20/17  0331 07/19/17  0539 07/18/17  0544   INR  4.54* 3.57* 3.06*   APTT seconds 44.9* 43.9* 38.3*       Results from last 7 days  Lab Units 07/18/17  1219   MAGNESIUM mg/dL 2.5*           Condition on Discharge: stable    Discharge Medications   Keisha Nelson   Home Medication Instructions LATASHA:[id number]    Printed on:07/20/17 9628   Medication Information                       acetaminophen (TYLENOL) 500 MG tablet  Take 500 mg by mouth Every 6 (Six) Hours As Needed for Mild Pain (1-3).             atorvastatin (LIPITOR) 20 MG tablet  Take 20 mg by mouth Daily.             B Complex-C-Folic Acid (NEPHRO-TC RX PO)  Take 1 tablet by mouth Daily.             busPIRone (BUSPAR) 10 MG tablet  Take 10 mg by mouth 2 (Two) Times a Day.             cyanocobalamin (VITAMIN B-12) 500 MCG tablet  Take 500 mcg by mouth Daily.             gabapentin (NEURONTIN) 100 MG capsule  Take 200 mg by mouth Daily.             loperamide (IMODIUM) 2 MG capsule  Take 2 mg by mouth 4 (Four) Times a Day As Needed for Diarrhea.             loratadine (CLARITIN) 10 MG tablet  Take 10 mg by mouth Daily.             Melatonin 10 MG tablet  Take 2 tablets by mouth At Night As Needed.             midodrine (PROAMATINE) 10 MG tablet  Take 1 tablet by mouth 3 (Three) Times a Day Before Meals.             sevelamer (RENVELA) 800 MG tablet  Take 1 tablet by mouth 3 (Three) Times a Day With Meals.             vitamin D (ERGOCALCIFEROL) 46515 UNITS capsule capsule  Take 50,000 Units by mouth 1 (One) Time Per Week.             warfarin (COUMADIN) 1 MG tablet  1 PO QD             warfarin (COUMADIN) 5 MG tablet  Take 5 mg by mouth See Admin Instructions. PER PT, TAKES 5MG ON SUN, TUES AND THURS             warfarin (COUMADIN) 7.5 MG tablet  Take 7.5 mg by mouth See Admin Instructions. PER PT, TAKES 7.5MG ON MON, WEDS, FRI, SAT                 Discharge Diet:   Diet Instructions     Diet: Cardiac, Renal; Thin Liquids, No Restrictions       Discharge Diet:   Cardiac  Renal      Fluid Consistency:  Thin Liquids, No Restrictions                 Activity at Discharge:   Activity Instructions     Activity as Tolerated                     Discharge disposition: SNF    Follow-up Appointments  Future Appointments  Date Time Provider Department Center   8/4/2017 10:10 AM [facility] None    8/18/2017 2:20 PM GUS Gasca [facility] None   10/4/2017 12:20 AM EMILIA BETHEA [facility] None   12/13/2017 11:40 AM Yamilet Zurita MD [facility] PC [facility] None     Additional Instructions for the Follow-ups that You Need to Schedule     Discharge Follow-Up With Specified Provider    As directed    To:  Dr Degroot in 1 week and Dr Johnson in 6 weeks                    Marli Johnson MD, [facility]  07/20/17  9:43 AM    Time: Discharge 45 min

## 2017-07-20 NOTE — DISCHARGE PLACEMENT REQUEST
"Keisha Nelson (72 y.o. Female)     Date of Birth Social Security Number Address Home Phone MRN    1945  [id number] [phone] [id number]    [employer] Marital Status          Adventism Single       Admission Date Admission Type Admitting Provider Attending Provider Department, Room/Bed    17 Elective Rizwan Saldaña MD Dillon, William, MD [address], 501/1    Discharge Date Discharge Disposition Discharge Destination         Skilled Nursing Facility (DC - External)             Attending Provider: Rizwan Saldaña MD     Allergies:  No Known Allergies    Isolation:  None   Infection:  None   Code Status:  FULL    Ht:  66\" (167.6 cm)   Wt:  178 lb 6 oz (80.9 kg)    Admission Cmt:  None   Principal Problem:  None                Active Insurance as of 2017     Primary Coverage     Payor Plan Insurance Group Employer/Plan Group    MEDICARE MEDICARE A & B      Payor Plan Address Payor Plan Phone Number Effective From Effective To    [address] [phone] [address]     [place] [address]       Subscriber Name Subscriber Birth Date Member ID       KEISHA NELSON 1945 [id number]           Secondary Coverage     Payor Plan Insurance Group Employer/Plan Group    ANTH BLUE CROSS [facility] SUPP [id number]     Payor Plan Address Payor Plan Phone Number Effective From Effective To    [address]  2/10/2017     [place] [address]       Subscriber Name Subscriber Birth Date Member ID       KEISHA NELSON 1945 [id number]                 Emergency Contacts      (Rel.) Home Phone Work Phone Mobile Phone    Becka Nelson (Daughter) -- -- [phone]    Naveen Nelson (Son) [phone] -- --    Nuha Nelson (Daughter) [phone] -- [phone]                 Discharge Summary      Marli Johnson MD at 2017  9:43 AM          Patient Name: Keisha Nelson  :1945  72 y.o.    Date of " Admit: 7/12/2017  Date of Discharge:  7/20/2017    Discharge Diagnosis:  Problem List Items Addressed This Visit        Cardiovascular and Mediastinum    Pulmonary hypertension    Relevant Orders    Cardiac Catheterization/Vascular Study (Completed)       Respiratory    Shortness of breath    Relevant Orders    Cardiac Catheterization/Vascular Study (Completed)      Other Visit Diagnoses     Chronic diastolic congestive heart failure        Relevant Medications    midodrine (PROAMATINE) 5 MG tablet    midodrine (PROAMATINE) 10 MG tablet    Other Relevant Orders    Cardiac Catheterization/Vascular Study (Completed)          Hospital Course:        1. Shortness of breath/acute respiratory failure. She came to see me for persistent shortness of breath. I recommended a right and left heart catheterization which did not reveal a specific reason for her dyspnea. She had a wedge pressure of 21 with a pulmonary pressure of 55/24. She had no significant coronary artery disease. Transthoracic echocardiogram showed moderately decreased LV function with an ejection fraction of 35% with severe pulmonary hypertension and severe tricuspid regurgitation. This was new when compared to previous evaluation. I suspect it is tachycardia mediated. She did have respiratory distress and code after her heart catheterization.  She required mechanical ventilation.  She was able to come off of the ventilator and is down to stable.  2. Atrial fibrillation. Pacemaker check in the office showed that she has persistently tachycardic. She has not been on rate lowering medication because of hypotension.  I believe she needs an AV node ablation.  She will follow-up with Dr. Degroot next week to discuss this with him.  3. Aortic valve replacement. Mechanical. Only one leaflet clearly opening on cath. Not well visualized on echo or heart catheter.  4. She has systolic and diastolic heart failure. Her last echo in April 2017 showed her ejection  fraction to be 50%. It is now down to 35%. This could be tachycardia mediated  5. COPD  6. Sleep apnea  7. End stage renal disease on hemodialysis  8. Diabetes  9. History of hypertension though more hypotension recently.  Continue Midrin at higher dose.  10. Hyperlipidemia  11. Ventricular tachycardia. When her pacemaker was checked in my office earlier this month that showed 2 episodes of ventricular tachycardia/ventricular fibrillation with responded to antitachycardia pacing and did not require shock.  12.  Chest pain.  This is consistent with musculoskeletal chest pain which would be consistent with her being coded.  Troponin elevated due to renal failure.  Not climbing.     I am okay with her going to rehabilitation today.  Her INR did jump up.  I will discharge her off of warfarin.  Repeat INR on Monday and resume warfarin as indicated.      Procedures Performed  Procedure(s):  Right and Left Heart Cath  Left ventriculography  Aortic root aortogram       Pertinent Test Results:     Results from last 7 days  Lab Units 07/20/17  0331   SODIUM mmol/L 132*   POTASSIUM mmol/L 4.1   CHLORIDE mmol/L 89*   CO2 mmol/L 16.8*   BUN mg/dL 27*   CREATININE mg/dL 5.35*   CALCIUM mg/dL 9.2   GLUCOSE mg/dL 107*       Results from last 7 days  Lab Units 07/18/17  1925 07/18/17  1219   TROPONIN T ng/mL 0.929* 0.933*       Results from last 7 days  Lab Units 07/20/17  0331   WBC 10*3/mm3 8.05   HEMOGLOBIN g/dL 11.6*   HEMATOCRIT % 36.8   PLATELETS 10*3/mm3 187       Results from last 7 days  Lab Units 07/20/17  0331 07/19/17  0539 07/18/17  0544   INR  4.54* 3.57* 3.06*   APTT seconds 44.9* 43.9* 38.3*       Results from last 7 days  Lab Units 07/18/17  1219   MAGNESIUM mg/dL 2.5*           Condition on Discharge: stable    Discharge Medications   Keisha Nelson   Home Medication Instructions LATASHA:[id number]    Printed on:07/20/17 0959   Medication Information                      acetaminophen (TYLENOL) 500 MG tablet  Take  500 mg by mouth Every 6 (Six) Hours As Needed for Mild Pain (1-3).             atorvastatin (LIPITOR) 20 MG tablet  Take 20 mg by mouth Daily.             B Complex-C-Folic Acid (NEPHRO-TC RX PO)  Take 1 tablet by mouth Daily.             busPIRone (BUSPAR) 10 MG tablet  Take 10 mg by mouth 2 (Two) Times a Day.             cyanocobalamin (VITAMIN B-12) 500 MCG tablet  Take 500 mcg by mouth Daily.             gabapentin (NEURONTIN) 100 MG capsule  Take 200 mg by mouth Daily.             loperamide (IMODIUM) 2 MG capsule  Take 2 mg by mouth 4 (Four) Times a Day As Needed for Diarrhea.             loratadine (CLARITIN) 10 MG tablet  Take 10 mg by mouth Daily.             Melatonin 10 MG tablet  Take 2 tablets by mouth At Night As Needed.             midodrine (PROAMATINE) 10 MG tablet  Take 1 tablet by mouth 3 (Three) Times a Day Before Meals.             sevelamer (RENVELA) 800 MG tablet  Take 1 tablet by mouth 3 (Three) Times a Day With Meals.             vitamin D (ERGOCALCIFEROL) 43098 UNITS capsule capsule  Take 50,000 Units by mouth 1 (One) Time Per Week.             warfarin (COUMADIN) 1 MG tablet  1 PO QD             warfarin (COUMADIN) 5 MG tablet  Take 5 mg by mouth See Admin Instructions. PER PT, TAKES 5MG ON SUN, TUES AND THURS             warfarin (COUMADIN) 7.5 MG tablet  Take 7.5 mg by mouth See Admin Instructions. PER PT, TAKES 7.5MG ON MON, WEDS, FRI, SAT                 Discharge Diet:   Diet Instructions     Diet: Cardiac, Renal; Thin Liquids, No Restrictions       Discharge Diet:   Cardiac  Renal      Fluid Consistency:  Thin Liquids, No Restrictions                 Activity at Discharge:   Activity Instructions     Activity as Tolerated                     Discharge disposition: SNF    Follow-up Appointments  Future Appointments  Date Time Provider Department Center   8/4/2017 10:10 AM [facility] None   8/18/2017 2:20 PM GUS Gasca [facility] None    10/4/2017 12:20 AM EMILIA BETHEA [facility] CD [facility] None   12/13/2017 11:40 AM Yamilet Zurita MD [facility] PC [facility] None     Additional Instructions for the Follow-ups that You Need to Schedule     Discharge Follow-Up With Specified Provider    As directed    To:  Dr Degroot in 1 week and Dr Johnson in 6 weeks                    Marli Johnson MD, [facility]  07/20/17  9:43 AM    Time: Discharge 45 min       Electronically signed by Marli Johnson MD at 7/20/2017  9:47 AM

## 2017-07-20 NOTE — NURSING NOTE
0445 Critical Lab Value (INR 4.54) Called to Cardiology, Reported to Dr. Sharma, also reported bleeding at Central line site and Pt report of Abdomen Pain, Call placed out to IV Therapy Team to attempt Peripheral IV Access.

## 2017-07-20 NOTE — SIGNIFICANT NOTE
07/20/17 0949   PT Discharge Summary   Reason for Discharge Discharge from facility   Discharge Destination SNF

## 2017-07-20 NOTE — TELEPHONE ENCOUNTER
----- Message from Malri Johnson MD sent at 7/20/2017  9:36 AM EDT -----  Regarding: follow up  Needs appt with KENJI or VS ASAP. See me in 6 weeks. JL

## 2017-07-20 NOTE — PLAN OF CARE
Problem: Patient Care Overview (Adult)  Goal: Plan of Care Review  Outcome: Ongoing (interventions implemented as appropriate)    07/20/17 0127   Coping/Psychosocial Response Interventions   Plan Of Care Reviewed With patient   Patient Care Overview   Progress progress toward functional goals as expected   Outcome Evaluation   Outcome Summary/Follow up Plan Central line bleeding from insertion site, Dressing and Caps changed, pressure applied, pressure dressing applied, bleeding is slower but continuous, Blood Pressure improved and VSS, Pt reports remaining Asymptomatic of blood loss, will monitor closely          Problem: Cardiac Catheterization with/without PCI (Adult)  Goal: Signs and Symptoms of Listed Potential Problems Will be Absent or Manageable (Cardiac Catheterization with/without PCI)  Outcome: Ongoing (interventions implemented as appropriate)    Problem: Respiratory Insufficiency (Adult)  Goal: Acid/Base Balance  Outcome: Ongoing (interventions implemented as appropriate)  Goal: Effective Ventilation  Outcome: Ongoing (interventions implemented as appropriate)

## 2017-07-20 NOTE — TELEPHONE ENCOUNTER
I have a message to Dr Degroot and Apoorva on where we can schedule Ms Nelson.  They are both out of the office at this time.    Thank you  Thelma SHAH

## 2017-07-20 NOTE — TELEPHONE ENCOUNTER
Dr. Johnson,    Called patient and left vm for patient to schedule a 6wk follow up with you.      Thelma,    See Message below to get patient scheduled with abhinav or Apoorva.    Eh

## 2017-07-21 NOTE — PLAN OF CARE
Problem: Patient Care Overview (Adult)  Goal: Plan of Care Review  Outcome: Ongoing (interventions implemented as appropriate)    07/21/17 0021 07/21/17 1414 07/21/17 1806   Coping/Psychosocial Response Interventions   Plan Of Care Reviewed With --  patient --    Patient Care Overview   Progress improving --  --    Outcome Evaluation   Outcome Summary/Follow up Plan --  --  Pt to be discharged to rehab after dialysis.       Goal: Adult Individualization and Mutuality  Outcome: Ongoing (interventions implemented as appropriate)    07/21/17 1806   Individualization   Patient Specific Preferences Pt to go to rehab after dialysis         Problem: Respiratory Insufficiency (Adult)  Goal: Acid/Base Balance  Outcome: Ongoing (interventions implemented as appropriate)    07/21/17 1806   Respiratory Insufficiency (Adult)   Acid/Base Balance making progress toward outcome       Goal: Effective Ventilation  Outcome: Ongoing (interventions implemented as appropriate)    07/21/17 1806   Respiratory Insufficiency (Adult)   Effective Ventilation making progress toward outcome         Problem: Fall Risk (Adult)  Goal: Absence of Falls  Outcome: Ongoing (interventions implemented as appropriate)    07/21/17 1806   Fall Risk (Adult)   Absence of Falls making progress toward outcome

## 2017-07-21 NOTE — PROGRESS NOTES
"   LOS: 9 days   Patient Care Team:  Yamilet Zurita MD as PCP - General (Family Medicine)    Chief Complaint/ Reason for encounter: End-stage renal disease, dialysis management        Subjective     History of Present Illness    Subjective:  Symptoms:  Stable.  She reports weakness.  No shortness of breath or chest pain.  (No new complaints  Doing well,   had dialysis yesterday for additional UF but requested, dialysis early  Discharge was held since dialysis was completed so late last night).    Diet:  Poor intake.  No nausea.    Activity level: Returning to normal.    Pain:  She reports no pain.          History taken from: Patient and chart    Objective     Vital Signs  Temp:  [97.1 °F (36.2 °C)-98.4 °F (36.9 °C)] 97.1 °F (36.2 °C)  Heart Rate:  [] 96  Resp:  [16-18] 18  BP: ()/(39-79) 108/67       Wt Readings from Last 1 Encounters:   07/21/17 0500 176 lb 7 oz (80 kg)   07/20/17 1900 177 lb 11.1 oz (80.6 kg)   07/20/17 0500 178 lb 6 oz (80.9 kg)   07/19/17 0500 179 lb 9 oz (81.4 kg)   07/18/17 0509 186 lb 5 oz (84.5 kg)   07/17/17 0645 186 lb 4.6 oz (84.5 kg)   07/15/17 0651 184 lb 1.4 oz (83.5 kg)   07/14/17 0500 187 lb 2.7 oz (84.9 kg)   07/13/17 0400 188 lb 11.4 oz (85.6 kg)   07/12/17 1345 185 lb 6.5 oz (84.1 kg)   07/12/17 1011 181 lb 4 oz (82.2 kg)       Objective:  General Appearance:  Comfortable, in no acute distress and not in pain (Intubated and sedated on the ventilator).    Vital signs: (most recent): Blood pressure 108/67, pulse 96, temperature 97.1 °F (36.2 °C), temperature source Oral, resp. rate 18, height 66\" (167.6 cm), weight 176 lb 7 oz (80 kg), SpO2 96 %.  Vital signs are normal.  No fever.    Output: Minimal urine output.    HEENT: Normal HEENT exam.    Lungs:  Normal respiratory rate and normal effort.  She is not in respiratory distress.  There are decreased breath sounds.  No wheezes, rales or rhonchi.    Heart: Normal rate.  Regular rhythm.  S1 normal and S2 normal.  No " murmur.   Abdomen: Abdomen is soft and non-distended.  Bowel sounds are normal.   There is no abdominal tenderness.  There is no epigastric area or no suprapubic area tenderness.  There is no rebound tenderness.     Extremities: Normal range of motion.  There is no deformity or dependent edema.    Pulses: Distal pulses are intact.    Pupils:  Pupils are equal, round, and reactive to light.    Skin:  Warm and dry.  No rash or cyanosis.             Results Review:    Past Medical History: reviewed and updated  Past Medical History:   Diagnosis Date   • Acute diastolic CHF (congestive heart failure)    • Anxiety    • Asthma    • Atrial fibrillation    • COPD (chronic obstructive pulmonary disease)    • Depression    • Diabetes mellitus    • ESRD (end stage renal disease)    • Hyperlipidemia    • Hypertension    • Long term current use of anticoagulant therapy    • Pulmonary hypertension    • Sleep apnea    • Tricuspid regurgitation          Allergies:  No Known Allergies    Intake/Output:     Intake/Output Summary (Last 24 hours) at 07/21/17 1111  Last data filed at 07/21/17 0424   Gross per 24 hour   Intake              375 ml   Output             1000 ml   Net             -625 ml         DATA:  Interval chart, labs and notes reviewed.  The following labs independently reviewed by me    Labs:   Recent Results (from the past 24 hour(s))   Protime-INR    Collection Time: 07/21/17  7:47 AM   Result Value Ref Range    Protime 40.4 (H) 11.7 - 14.2 Seconds    INR 4.35 (C) 0.90 - 1.10   aPTT    Collection Time: 07/21/17  7:47 AM   Result Value Ref Range    PTT 48.4 (H) 22.7 - 35.4 seconds   Renal Function Panel    Collection Time: 07/21/17  7:47 AM   Result Value Ref Range    Glucose 99 65 - 99 mg/dL    BUN 17 8 - 23 mg/dL    Creatinine 4.41 (H) 0.57 - 1.00 mg/dL    Sodium 137 136 - 145 mmol/L    Potassium 4.3 3.5 - 5.2 mmol/L    Chloride 96 (L) 98 - 107 mmol/L    CO2 18.3 (L) 22.0 - 29.0 mmol/L    Calcium 10.0 8.6 - 10.5  mg/dL    Albumin 3.50 3.50 - 5.20 g/dL    Phosphorus 4.5 2.5 - 4.5 mg/dL    Anion Gap 22.7 mmol/L    BUN/Creatinine Ratio 3.9 (L) 7.0 - 25.0    eGFR  African Amer 12 (L) >60 mL/min/1.73   CBC Auto Differential    Collection Time: 07/21/17  7:47 AM   Result Value Ref Range    WBC 7.41 4.50 - 10.70 10*3/mm3    RBC 3.94 3.90 - 5.20 10*6/mm3    Hemoglobin 11.9 11.9 - 15.5 g/dL    Hematocrit 38.3 35.6 - 45.5 %    MCV 97.2 80.5 - 98.2 fL    MCH 30.2 26.9 - 32.0 pg    MCHC 31.1 (L) 32.4 - 36.3 g/dL    RDW 20.8 (H) 11.7 - 13.0 %    RDW-SD 64.5 (H) 37.0 - 54.0 fl    MPV 10.5 6.0 - 12.0 fL    Platelets 197 140 - 500 10*3/mm3   Scan Slide    Collection Time: 07/21/17  7:47 AM   Result Value Ref Range    Scan Slide     Manual Differential    Collection Time: 07/21/17  7:47 AM   Result Value Ref Range    Neutrophil % 72.0 42.7 - 76.0 %    Lymphocyte % 23.0 19.6 - 45.3 %    Monocyte % 3.0 (L) 5.0 - 12.0 %    Eosinophil % 1.0 0.3 - 6.2 %    Myelocyte % 1.0 (H) 0.0 - 0.0 %    Neutrophils Absolute 5.34 1.90 - 8.10 10*3/mm3    Lymphocytes Absolute 1.70 0.90 - 4.80 10*3/mm3    Monocytes Absolute 0.22 0.20 - 1.20 10*3/mm3    Eosinophils Absolute 0.07 0.00 - 0.70 10*3/mm3    nRBC 9.0 (H) 0.0 - 0.0 /100 WBC    RBC Morphology Normal Normal    WBC Morphology Normal Normal    Platelet Morphology Normal Normal       Radiology:  Imaging Results (last 24 hours)     ** No results found for the last 24 hours. **             Medications have been reviewed:  Current Facility-Administered Medications   Medication Dose Route Frequency Provider Last Rate Last Dose   • acetaminophen (TYLENOL) tablet 650 mg  650 mg Oral Q6H PRN Feliberto Kinsey MD   650 mg at 07/18/17 1310   • fentaNYL citrate (PF) (SUBLIMAZE) injection 25 mcg  25 mcg Intravenous Q2H PRN Feliberto Kinsey MD   25 mcg at 07/17/17 0107   • Glycerin-Hypromellose- (ARTIFICIAL TEARS) 0.2-0.2-1 % ophthalmic solution solution 2 drop  2 drop Both Eyes Q3H PRN Norman Arizmendi MD    2 drop at 07/15/17 1408   • loperamide (IMODIUM) capsule 2 mg  2 mg Oral 4x Daily PRN Fer Abarca MD   2 mg at 07/16/17 1703   • mannitol 25% (RENAL) injection  25 g Intravenous PRN Veto Gray MD   25 g at 07/17/17 1139   • mannitol 25% (RENAL) injection  25 g Intravenous PRN Toni Oconnell  mL/hr at 07/20/17 1913 12.5 g at 07/20/17 1913   • midodrine (PROAMATINE) tablet 10 mg  10 mg Oral TID AC Toni Oconnell MD   10 mg at 07/21/17 0658   • nitroglycerin (NITROSTAT) SL tablet 0.4 mg  0.4 mg Sublingual Q5 Min PRN Rizwan Saldaña MD   0.4 mg at 07/19/17 0814   • sevelamer (RENVELA) tablet 800 mg  800 mg Oral TID With Meals Laura Frances MD   800 mg at 07/21/17 0928   • warfarin (COUMADIN) tablet 1 mg  1 mg Oral Daily Marli Johnson MD   Stopped at 07/20/17 1800     Facility-Administered Medications Ordered in Other Encounters   Medication Dose Route Frequency Provider Last Rate Last Dose   • Propofol (DIPRIVAN) injection    PRN Francesco Johnson MD   100 mg at 07/12/17 1155   • succinylcholine (ANECTINE) injection   Intravenous PRN Francesco Johnson MD   100 mg at 07/12/17 1155       Assessment/Plan     Active Problems:    Shortness of breath    Cardiogenic shock    Acute respiratory failure with hypoxia    Pulmonary edema    Hypocalcemia    Metabolic acidosis      Assessment:  (End-stage renal disease.  Status post cardiac arrest  Cardiogenic shock, improved  Acute respiratory failure, much improved  Hypokalemia postdialysis, improved    Hypotension status post code  Metabolic acidosis, improved  Congestive heart failure  Type 2 diabetes mellitus   hypocalcemia- improved        Plan:   Her normal outpatient dialysis schedule will be Monday Wednesday Friday  We'll plan additional dialysis treatment today prior to discharge to avoid a three-day gap between treatments  We discussed fluid restriction  Stable for discharge to rehabilitation after dialysis today).             Continue to monitor  renal function, electroytes and volume closely   Please call me with any questions or concerns      Toni Oconnell MD   [facility]   [phone]    07/21/17  11:11 AM      Dictation performed using Dragon dictation software

## 2017-07-21 NOTE — NURSING NOTE
Called [facility] @ 2020 on 7/20/2017 and informed discharge cancelled for tonight. Spoke with Sienna.

## 2017-07-21 NOTE — PLAN OF CARE
Problem: Patient Care Overview (Adult)  Goal: Plan of Care Review  Outcome: Ongoing (interventions implemented as appropriate)    07/21/17 0021   Coping/Psychosocial Response Interventions   Plan Of Care Reviewed With patient   Patient Care Overview   Progress improving   Outcome Evaluation   Outcome Summary/Follow up Plan Declined to be discharged to Rehab pm 7/20/2017. MD notified. Son can provide transport after 12:30 pm Friday to [facility] for Rehab-weak/Active with P.T./On HD-received HD Thursday, but routine schedule is M-W-F/Will continue to monitor         Problem: Respiratory Insufficiency (Adult)  Goal: Identify Related Risk Factors and Signs and Symptoms  Outcome: Outcome(s) achieved Date Met:  07/21/17  Goal: Acid/Base Balance  Outcome: Ongoing (interventions implemented as appropriate)  Goal: Effective Ventilation  Outcome: Ongoing (interventions implemented as appropriate)    Problem: Fall Risk (Adult)  Goal: Identify Related Risk Factors and Signs and Symptoms  Outcome: Outcome(s) achieved Date Met:  07/21/17  Goal: Absence of Falls  Outcome: Ongoing (interventions implemented as appropriate)

## 2017-07-24 NOTE — PROGRESS NOTES
Continued Stay Note  [facility]     Patient Name: Keisha Nelson  MRN: [id number]  Today's Date: 7/24/2017    Admit Date: 7/12/2017          Discharge Plan       07/24/17 1736    Case Management/Social Work Plan    Additional Comments Pt was not dc'd to [facility] on 7/20.  She was to be dc'd after HD, but got back to floor so late, she did not want to go.  Family was to transport.  CCP worked with facility and MD on 7/21 to get pt to HD early so she could dc on Friday evening.  Son was to transport pt.               Discharge Codes       07/24/17 1738    Discharge Codes    Discharge Codes 06  Discharged/transferred to home under care of organized home health service organization in anticipation of skilled care        Expected Discharge Date and Time     Expected Discharge Date Expected Discharge Time    Jul 21, 2017  9:00 PM            Savanna Maloney RN

## 2017-07-28 NOTE — PROGRESS NOTES
Date of Office Visit: 2017  Encounter Provider: Alex Degroot MD  Place of Service: [facility]  Patient Name: Keisha Nelson  : 1945    Subjective:     Encounter Date:2017      Patient ID: Keisha Nelson is a 72 y.o. female who has a cc of perm af and AVR and ESRD (4 years) and LV dysfunction who was recently discharged from [facility] -- heart failure     She is sent by [facility] for consideration of av node ablation.     Used to see Dr King ([facility])     Exercise tolerance: wheelchair  Breathing is much better.   Not [place]. Sleeps with O2   Strength is improving -- doing rehab     There have been no bleeding events.       Past Medical History:   Diagnosis Date   • Acute diastolic CHF (congestive heart failure)    • Anxiety    • Asthma    • Atrial fibrillation    • Atrial fibrillation    • COPD (chronic obstructive pulmonary disease)    • Depression    • Diabetes mellitus    • ESRD (end stage renal disease)    • Hyperlipidemia    • Hypertension    • Long term current use of anticoagulant therapy    • Pulmonary hypertension    • Sleep apnea    • Tricuspid regurgitation        Social History     Social History   • Marital status: Single     Spouse name: N/A   • Number of children: N/A   • Years of education: N/A     Occupational History   • Not on file.     Social History Main Topics   • Smoking status: Former Smoker   • Smokeless tobacco: Not on file   • Alcohol use No   • Drug use: No   • Sexual activity: Defer     Other Topics Concern   • Not on file     Social History Narrative       Review of Systems   Constitution: Negative for fever and night sweats.   HENT: Negative for ear pain and stridor.    Eyes: Negative for discharge and visual halos.   Cardiovascular: Negative for cyanosis.   Respiratory: Negative for hemoptysis and sputum production.    Hematologic/Lymphatic: Negative for adenopathy.   Skin: Negative for nail changes and unusual hair distribution.  "  Musculoskeletal: Positive for falls and muscle weakness. Negative for gout.   Gastrointestinal: Negative for bowel incontinence and flatus.   Genitourinary: Negative for dysuria and flank pain.   Neurological: Negative for seizures and tremors.   Psychiatric/Behavioral: Negative for altered mental status. The patient is not nervous/anxious.             Objective:     Vitals:    07/28/17 0831   BP: 126/70   Pulse: 102   Weight: 182 lb (82.6 kg)   Height: 66\" (167.6 cm)         Physical Exam   Constitutional: She is oriented to person, place, and time.   HENT:   Head: Normocephalic and atraumatic.   Eyes: Right eye exhibits no discharge. Left eye exhibits no discharge.   Neck: No JVD present. No thyromegaly present.   Cardiovascular: Normal rate.  An irregularly irregular rhythm present. Exam reveals no gallop and no friction rub.    No murmur heard.  Pulmonary/Chest: Effort normal and breath sounds normal. She has no rales.   Abdominal: Soft. Bowel sounds are normal. There is no tenderness.   Musculoskeletal: Normal range of motion. She exhibits no edema or deformity.   Neurological: She is alert and oriented to person, place, and time. She exhibits normal muscle tone.   Skin: Skin is warm and dry. No erythema.   Psychiatric: She has a normal mood and affect. Her behavior is normal. Thought content normal.         ECG 12 Lead  Date/Time: 7/28/2017 9:53 AM  Performed by: KATI MERCER  Authorized by: KATI MERCER   Comparison: compared with previous ECG   Similar to previous ECG  Rhythm: atrial fibrillation  Rate: tachycardic  Conduction: right bundle branch block  QRS axis: indeterminate  Clinical impression: abnormal ECG            Lab Review:       Assessment:          Diagnosis Plan   1. Chronic a-fib     2. Chronic diastolic heart failure     3. History of aortic valve replacement     4. History of prosthetic heart valve     5. Chronic obstructive bronchitis     6. Shortness of breath     7. End stage renal " failure on dialysis            Plan:         This lady is chronically ill with numerous medical problems.  She is frail on dialysis for four years   She is however improving after her code blue and heart failure admission     I would not do any procedures on her as she is improving     The problem with ablation of the av node besides lying her down and doing a procedure is that she would have RV apical pacing and she already has LV dysfunction    She has an ICD and this will soon be a big problem -- as she is clearly close to end of life (weeks to months)     We talked about ICD shock deativation and she is amendable but I want her to think about it.

## 2017-08-07 NOTE — ED NOTES
Pt presents to the ed with complaints of have fallen onto a air conditioner unit on her way to the bathroom. Pt fell onto her left temporal lobe.  Pt denies pain at this time.      Duarte Cabello RN  08/07/17 0513

## 2017-08-07 NOTE — ED PROVIDER NOTES
EMERGENCY DEPARTMENT ENCOUNTER    CHIEF COMPLAINT  Chief Complaint: Fall, head injury  History given by: patient   History limited by: n/a  Room Number: 13/13  PMD: Yamilet Zurita MD      HPI:  Pt is a 72 y.o. female who presents after a fall. Pt states that she fell while walking to the bathroom. Pt states that she hit her head on the air conditioner unit, but she denies LOC and remembers the event. Pt also complains of left knee pain, but denies nausea, vomiting, or any other sx. Pt is currently on Coumadin.     Duration:  Prior to arrival  Onset: sudden  Timing: constant   Location: left temporal  Radiation: none  Quality: pain  Intensity/Severity: moderate  Progression: unchanged   Associated Symptoms: left knee pain  Aggravating Factors: none  Alleviating Factors: none  Previous Episodes: none  Treatment before arrival: none    PAST MEDICAL HISTORY  Active Ambulatory Problems     Diagnosis Date Noted   • Benign essential hypertension 12/14/2016   • Congestive heart failure 12/14/2016   • Chronic kidney disease, stage IV (severe) 12/14/2016   • Chronic obstructive pulmonary disease 12/14/2016   • Primary hypertrophic cardiomyopathy 12/14/2016   • History of aortic valve replacement 10/19/2016   • Imbalance 02/24/2017   • End stage renal failure on dialysis 02/24/2017   • Type 2 diabetes mellitus 02/24/2017   • Chronic a-fib 02/24/2017   • Dizziness 02/25/2017   • Bleeding internal hemorrhoids 09/29/2015   • Hematochezia 06/04/2016   • Systemic infection 07/14/2016   • Calciphylaxis 07/01/2016   • Cellulitis of lower extremity 09/24/2016   • Centrilobular emphysema 02/22/2017   • Chronic diastolic heart failure 11/06/2014   • Chronic obstructive bronchitis 01/20/2016   • Multiple-type hyperlipidemia 02/22/2017   • Dependence on renal dialysis 01/28/2014   • Depression 05/15/2013   • Diabetic hypoglycemia 07/14/2016   • Diabetic peripheral neuropathy 01/20/2016   • Edema 11/14/2012   • History of prosthetic heart  valve 01/20/2016   • Long term current use of anticoagulant 02/22/2017   • Long term current use of insulin 01/28/2014   • Major depressive disorder with single episode 08/11/2014   • Nonsustained ventricular tachycardia 01/20/2016   • Obstructive sleep apnea syndrome 05/15/2013   • Periumbilic abdominal tenderness 06/04/2016   • Pulmonary hypertension 05/15/2013   • Automatic implantable cardioverter-defibrillator in situ 03/15/2017   • Skin ulcer 07/01/2016   • Shortness of breath 11/14/2012   • Chronic fatigue 03/29/2017   • Cardiogenic shock 07/12/2017   • Acute respiratory failure with hypoxia 07/12/2017   • Pulmonary edema 07/12/2017   • Hypocalcemia 07/13/2017   • Metabolic acidosis 07/13/2017     Resolved Ambulatory Problems     Diagnosis Date Noted   • Diarrhea 02/24/2017   • Body mass index (BMI) of 40.0-44.9 in adult 01/28/2014   • Cough 11/14/2012   • Transient alteration of awareness 07/14/2016   • Generalized weakness 04/29/2017     Past Medical History:   Diagnosis Date   • Acute diastolic CHF (congestive heart failure)    • Anxiety    • Asthma    • Atrial fibrillation    • Atrial fibrillation    • COPD (chronic obstructive pulmonary disease)    • Depression    • Diabetes mellitus    • ESRD (end stage renal disease)    • Hyperlipidemia    • Hypertension    • Long term current use of anticoagulant therapy    • Pulmonary hypertension    • Sleep apnea    • Tricuspid regurgitation        PAST SURGICAL HISTORY  Past Surgical History:   Procedure Laterality Date   • AORTIC VALVE REPAIR/REPLACEMENT     • CARDIAC CATHETERIZATION N/A 7/12/2017    Procedure: Right and Left Heart Cath;  Surgeon: Rizwan Saldaña MD;  Location: [facility] CATH INVASIVE LOCATION;  Service:    • CARDIAC CATHETERIZATION N/A 7/12/2017    Procedure: Left ventriculography;  Surgeon: Rizwan Saldaña MD;  Location: [facility] CATH INVASIVE LOCATION;  Service:    • CARDIAC CATHETERIZATION N/A 7/12/2017    Procedure: Aortic root aortogram;   Surgeon: Rizwan Saldaña MD;  Location: [facility] INVASIVE LOCATION;  Service:    • CARDIAC DEFIBRILLATOR PLACEMENT Left    • DIALYSIS FISTULA CREATION Right    • LAPAROSCOPIC CHOLECYSTECTOMY     • PARTIAL HYSTERECTOMY         FAMILY HISTORY  Family History   Problem Relation Age of Onset   • Hypertension Mother    • Lung cancer Mother    • Heart attack Father    • Hypertension Father    • Breast cancer Sister    • Anxiety disorder Sister    • Alcohol abuse Brother        SOCIAL HISTORY  Social History     Social History   • Marital status:      Spouse name: N/A   • Number of children: N/A   • Years of education: N/A     Occupational History   • Not on file.     Social History Main Topics   • Smoking status: Former Smoker   • Smokeless tobacco: Not on file   • Alcohol use No   • Drug use: No   • Sexual activity: Defer     Other Topics Concern   • Not on file     Social History Narrative       ALLERGIES  Review of patient's allergies indicates no known allergies.    REVIEW OF SYSTEMS  Review of Systems   Constitutional: Negative for chills and fever.   HENT: Negative for congestion and sore throat.    Eyes: Negative.    Respiratory: Negative for cough and shortness of breath.    Cardiovascular: Negative for chest pain and leg swelling.   Gastrointestinal: Negative for abdominal pain, diarrhea and vomiting.   Genitourinary: Negative for difficulty urinating and dysuria.   Musculoskeletal: Positive for arthralgias (left knee pain). Negative for back pain and neck pain.   Skin: Negative for rash and wound.   Allergic/Immunologic: Negative.    Neurological: Positive for headaches. Negative for dizziness, weakness and numbness.   Psychiatric/Behavioral: Negative.    All other systems reviewed and are negative.      PHYSICAL EXAM  ED Triage Vitals   Temp Heart Rate Resp BP SpO2   08/07/17 0532 08/07/17 0527 08/07/17 0527 08/07/17 0527 --   96.7 °F (35.9 °C) 78 16 112/92       Temp src Heart Rate Source Patient  Position BP Location FiO2 (%)   08/07/17 0532 08/07/17 0527 08/07/17 0527 08/07/17 0527 --   Tympanic Monitor Lying Right arm        Physical Exam   Constitutional: She is oriented to person, place, and time and well-developed, well-nourished, and in no distress. No distress.   HENT:   Head: Normocephalic. Head is with abrasion (left frontal).   Eyes: EOM are normal. Pupils are equal, round, and reactive to light.   Neck: Normal range of motion. Neck supple.   Cardiovascular: Normal rate, regular rhythm, normal heart sounds and intact distal pulses.    Pulmonary/Chest: Effort normal and breath sounds normal. No respiratory distress.   Abdominal: Soft. There is no tenderness. There is no rebound and no guarding.   Musculoskeletal: Normal range of motion. She exhibits no edema.        Left knee: Tenderness (w/ abrasion) found.   Neurological: She is alert and oriented to person, place, and time.   Skin: Skin is warm and dry. No rash noted.   Psychiatric: Mood and affect normal.   Nursing note and vitals reviewed.    RADIOLOGY  CT Head Without Contrast   Final Result   1. No evidence of acute intracranial process.                       LEFT KNEE 2 VIEWS       HISTORY: Post traumatic left knee pain       COMPARISON: None available       FINDINGS:   1. Severe diffuse vascular calcification.   2. Pan compartmental osteoarthritis.   3. Evidence of AVN distal medial femoral condyle.   4. No acute osseous abnormality.       This report was finalized on 8/7/2017 6:40 AM by Dr. Faraz Belcher MD.          XR Knee 1 or 2 View Left   Final Result   1. No evidence of acute intracranial process.                       LEFT KNEE 2 VIEWS       HISTORY: Post traumatic left knee pain       COMPARISON: None available       FINDINGS:   1. Severe diffuse vascular calcification.   2. Pan compartmental osteoarthritis.   3. Evidence of AVN distal medial femoral condyle.   4. No acute osseous abnormality.       This report was finalized on  8/7/2017 6:40 AM by Dr. Faraz Belcher MD.          XR left knees shows NAD  CT head shows NAD    I ordered the above noted radiological studies. Interpreted by radiologist. Discussed with radiologist. (Dr Case)  Reviewed by me in PACS.       PROCEDURES  Procedures      PROGRESS AND CONSULTS  ED Course     05:44  XR left knee and CT head ordered for further evaluation.     06:33  BP- 112/92 HR- 78 Temp- 96.7 °F (35.9 °C) (Tympanic) O2 sat- 98%  Rechecked the patient who is in NAD and is resting comfortably. Advised pt and family that the XR and CT show NAD, but we are awaiting the official read from the radiologist. Anticipate discharge. Pt understands and agrees with the plan, all questions answered.    MEDICAL DECISION MAKING  Results were reviewed/discussed with the patient and they were also made aware of online access. Pt also made aware that some labs, such as cultures, will not be resulted during ER visit and follow up with PMD is necessary.     MDM  Number of Diagnoses or Management Options  Knee abrasion, left, initial encounter:   Scalp abrasion, initial encounter:      Amount and/or Complexity of Data Reviewed  Tests in the radiology section of CPT®: ordered and reviewed (XR left knee shows NAD  CT head shows NAD)  Discussion of test results with the performing providers: yes (Dr Case)    Patient Progress  Patient progress: stable         DIAGNOSIS  Final diagnoses:   Scalp abrasion, initial encounter   Knee abrasion, left, initial encounter       DISPOSITION  DISCHARGE    Patient discharged in stable condition.    Reviewed implications of results, diagnosis, meds, responsibility to follow up, warning signs and symptoms of possible worsening, potential complications and reasons to return to ER.    Patient/Family voiced understanding of above instructions.    Discussed plan for discharge, as there is no emergent indication for admission.  Pt/family is agreeable and understands need for follow up and  repeat testing.  Pt is aware that discharge does not mean that nothing is wrong but it indicates no emergency is present that requires admission and they must continue care with follow-up as given below or physician of their choice.     FOLLOW-UP  Yamilet Zurita MD  [address] [address] [address]  [phone]    Schedule an appointment as soon as possible for a visit           Medication List      Changed          warfarin 5 MG tablet   Commonly known as:  COUMADIN   What changed:  Another medication with the same name was removed. Continue   taking this medication, and follow the directions you see here.           Latest Documented Vital Signs:  As of 6:43 AM  BP- 112/92 HR- 78 Temp- 96.7 °F (35.9 °C) (Tympanic) O2 sat- 98%    --  Documentation assistance provided by essence Kirkland for Dr Gaxiola.  Information recorded by the scribe was done at my direction and has been verified and validated by me.        Chitra Kirkland  08/07/17 0639       Ochoa Gaxiola MD  08/07/17 0643

## 2017-08-20 PROBLEM — A41.9 SEPSIS (HCC): Status: ACTIVE | Noted: 2017-01-01

## 2017-08-20 NOTE — PROCEDURES
Central Venous Catheter Insertion Procedure Note    [facility] procedure note    Right common femoral central venous line insertion.    Indications:  vascular access    Procedure Details   Informed consent was obtained for the procedure, including sedation.  Risks of lung perforation, hemorrhage, arrhythmia, and adverse drug reaction were discussed.     Maximum sterile technique was used including usual patient drapes, antiseptics and physician sterile garments.    Under sterile conditions the skin above the on the right internal jugular vein was prepped with Chlorhexidine and covered with a sterile drape. Local anesthesia was applied to the skin and subcutaneous tissues with lidocaine 1%.  Under sterile conditions I used ultrasound to identify the right internal jugular vein.  It had internal echoes suggesting thrombosis.  For this reason this site was aborted.    I subsequently prepped and draped sterilely with chlorhexidine the right groin area.  With a sterile ultrasound probe I was able to identify the right common femoral vein without difficulty.  The area was locally anesthetized with 1% lidocaine without difficulty.  An 18-gauge needle was then inserted into the vein. A guide wire was then passed easily through the catheter. A quad lumen central venous catheter was then inserted into the vessel over the guide wire. The catheter was sutured into place and dressed following sterile protocol with Biopatch placed.    Findings:  There were no changes to vital signs. All catheter ports were flushed with saline. Patient did tolerate procedure well.    Loss: Minimal  Complications: None    Recommendations:  . No chest x-ray ordered if this was a femoral vein.    Aj Saavedra MD  8/20/2017

## 2017-08-20 NOTE — H&P
[place] [facility]    CRITICAL CARE ADMISSION NOTE    CC: Altered mental status    HPI:   72-year-old Afro-American female who is in bad shape.  She comes due to altered mental status.  She cannot provide her own history.  I have reviewed old medical records.  I have reviewed notes from Dr. Degroot, Dr. Toni jack, and Dr. Marli Johnson all within the past 30 days.    The patient now presents after having had a fall approximately 8 in the morning at the nursing home.  A fall apparently happened because of altered mental status.  The patient never lost consciousness.  She does have a history of frequent falls.  She does have peripheral neuropathy and diabetic feet.  The patient is on Coumadin.  Yesterday she had partial hemodialysis.  It is unclear why she could not complete a full session of dialysis.  Upon arrival here in the emergency room she is showing sepsis criteria, with atrial fibrillation, fever, hypotension and tachypnea.     Review of Systems   Unable to perform ROS: Mental status change       Family History   Problem Relation Age of Onset   • Hypertension Mother    • Lung cancer Mother    • Heart attack Father    • Hypertension Father    • Breast cancer Sister    • Anxiety disorder Sister    • Alcohol abuse Brother        Social History     Social History   • Marital status:      Spouse name: N/A   • Number of children: N/A   • Years of education: N/A     Social History Main Topics   • Smoking status: Former Smoker   • Smokeless tobacco: None   • Alcohol use No   • Drug use: No   • Sexual activity: Defer     Other Topics Concern   • None     Social History Narrative       Past Medical History:   Diagnosis Date   • Acute diastolic CHF (congestive heart failure)    • Anxiety    • Asthma    • Atrial fibrillation     chronic    • Chronic diastolic heart failure    • Chronic obstructive bronchitis    • COPD (chronic obstructive pulmonary disease)    • Depression    • Diabetes mellitus    •  ESRD (end stage renal disease)    • Falls    • Hyperlipidemia    • Hypertension    • Long term current use of anticoagulant therapy    • Muscle weakness    • Pulmonary hypertension    • Sleep apnea    • SOB (shortness of breath)    • Tricuspid regurgitation        Past Surgical History:   Procedure Laterality Date   • AORTIC VALVE REPAIR/REPLACEMENT     • CARDIAC CATHETERIZATION N/A 7/12/2017    Procedure: Right and Left Heart Cath;  Surgeon: Rizwan Saldaña MD;  Location: [facility] CATH INVASIVE LOCATION;  Service:    • CARDIAC CATHETERIZATION N/A 7/12/2017    Procedure: Left ventriculography;  Surgeon: Rizwan Saldaña MD;  Location:  [facility] CATH INVASIVE LOCATION;  Service:    • CARDIAC CATHETERIZATION N/A 7/12/2017    Procedure: Aortic root aortogram;  Surgeon: Rizwan Saldaña MD;  [facility];  Service:    • CARDIAC DEFIBRILLATOR PLACEMENT Left    • DIALYSIS FISTULA CREATION Right    • LAPAROSCOPIC CHOLECYSTECTOMY     • PARTIAL HYSTERECTOMY         Prior to Admission Medications Reviewed and reconciled     Prescriptions Last Dose Informant Patient Reported? Taking?    acetaminophen (TYLENOL) 500 MG tablet  Self Yes Yes    Take 500 mg by mouth Every 6 (Six) Hours As Needed for Mild Pain (1-3).    albuterol (PROVENTIL) (2.5 MG/3ML) 0.083% nebulizer solution   Yes Yes    Take 2.5 mg by nebulization Every 4 (Four) Hours As Needed for Wheezing.    atorvastatin (LIPITOR) 20 MG tablet  Self Yes Yes    Take 20 mg by mouth Daily.    B Complex-C-Folic Acid (NEPHRO-TC RX PO)  Self Yes Yes    Take 1 tablet by mouth Daily.    benzonatate (TESSALON) 200 MG capsule   Yes Yes    Take 200 mg by mouth 3 (Three) Times a Day As Needed for Cough.    budesonide-formoterol (SYMBICORT) 160-4.5 MCG/ACT inhaler   Yes Yes    Inhale 2 puffs 2 (Two) Times a Day.    busPIRone (BUSPAR) 10 MG tablet   Yes Yes    Take 10 mg by mouth 2 (Two) Times a Day.    cyanocobalamin (VITAMIN B-12) 500 MCG tablet  Self Yes Yes     Take 500 mcg by mouth Daily.    gabapentin (NEURONTIN) 100 MG capsule   Yes Yes    Take 200 mg by mouth Daily.    guaiFENesin (MUCINEX) 600 MG 12 hr tablet   Yes Yes    Take 1,200 mg by mouth 2 (Two) Times a Day.    loperamide (IMODIUM) 2 MG capsule  Self Yes Yes    Take 2 mg by mouth 4 (Four) Times a Day As Needed for Diarrhea.    loratadine (CLARITIN) 10 MG tablet  Self Yes Yes    Take 10 mg by mouth Daily.    LORazepam (ATIVAN) 0.5 MG tablet   Yes Yes    Take 0.5 mg by mouth Every 8 (Eight) Hours As Needed for Anxiety.    Melatonin 10 MG tablet   Yes Yes    Take 2 tablets by mouth At Night As Needed.    midodrine (PROAMATINE) 10 MG tablet   No Yes    Take 1 tablet by mouth 3 (Three) Times a Day Before Meals.    sevelamer (RENVELA) 800 MG tablet   No Yes    Take 1 tablet by mouth 3 (Three) Times a Day With Meals.    vitamin D (ERGOCALCIFEROL) 57516 UNITS capsule capsule  Self Yes Yes    Take 50,000 Units by mouth 1 (One) Time Per Week.    warfarin (COUMADIN) 4 MG tablet   Yes Yes    Take 4 mg by mouth Daily.    warfarin (COUMADIN) 5 MG tablet   Yes No    Take 5 mg by mouth See Admin Instructions. PER PT, TAKES 5MG ON SUN, TUES AND THURS    warfarin (COUMADIN) 7.5 MG tablet  Self Yes No    Take 7.5 mg by mouth See Admin Instructions. PER PT, TAKES 7.5MG ON MON, WEDS, FRI, SAT          Review of patient's allergies indicates no known allergies.    Temp:  [99.8 °F (37.7 °C)-101.8 °F (38.8 °C)] 99.8 °F (37.7 °C)  Heart Rate:  [132-160] 134  Resp:  [28-30] 28  BP: ()/(28-91) 108/91    Physical Exam   Constitutional: She appears distressed.   HENT:   Head: Normocephalic.   Mouth/Throat: Oropharynx is clear and moist.   Eyes: Conjunctivae and EOM are normal. No scleral icterus.   Neck: No tracheal deviation present. No thyromegaly present.   Cardiovascular:   Tachycardic, irregularly irregular, 3/6 systolic murmur.  2+ mushy edema in both legs   Pulmonary/Chest: She is in respiratory distress. She has wheezes. She  has rales. She exhibits no tenderness.   She is using accessory muscles to breathe.   Abdominal: She exhibits no distension and no mass. There is no tenderness.   Musculoskeletal: Normal range of motion. She exhibits no deformity.   Neurological: She is alert. No cranial nerve deficit.   The patient is not conversant.  She does not follow all my commands.  It is difficult to perform complete neurologic exam but I don't see any gross cranial nerve deficits.   Skin: Skin is warm. No rash noted. She is diaphoretic. No erythema.   Psychiatric:   The patient appears very anxious.  She is not verbalizing or communicating with me.  She is not giving me her name       LABS and IMAGING DATA:    Lab Results (last 24 hours)     Procedure Component Value Units Date/Time    POC Glucose Fingerstick [[id number]]  (Normal) Collected:  08/20/17 1140    Specimen:  Blood Updated:  08/20/17 1142     Glucose 130 mg/dL     Narrative:       M[facility] [id number] : [id number] [facility]    Blood Culture [[id number]] Collected:  08/20/17 1220    Specimen:  Blood from Arm, Left Updated:  08/20/17 1225    CBC & Differential [[id number]] Collected:  08/20/17 1220    Specimen:  Blood Updated:  08/20/17 1235    Narrative:       The following orders were created for panel order CBC & Differential.  Procedure                               Abnormality         Status                     ---------                               -----------         ------                     CBC Auto Differential[[id number]]        Abnormal            Final result                 Please view results for these tests on the individual orders.    CBC Auto Differential [[id number]]  (Abnormal) Collected:  08/20/17 1220    Specimen:  Blood Updated:  08/20/17 1235     WBC 22.17 (H) 10*3/mm3      RBC 4.30 10*6/mm3      Hemoglobin 12.9 g/dL      Hematocrit 38.9 %      MCV 90.5 fL      MCH 30.0 pg      MCHC 33.2 g/dL      RDW 18.9 (H) %      RDW-SD 61.4 (H) fl      MPV  10.6 fL      Platelets 198 10*3/mm3      Neutrophil % 95.5 (H) %      Lymphocyte % 0.9 (L) %      Monocyte % 2.7 (L) %      Eosinophil % 0.0 (L) %      Basophil % 0.1 %      Immature Grans % 0.8 (H) %      Neutrophils, Absolute 21.19 (H) 10*3/mm3      Lymphocytes, Absolute 0.20 (L) 10*3/mm3      Monocytes, Absolute 0.59 10*3/mm3      Eosinophils, Absolute 0.00 10*3/mm3      Basophils, Absolute 0.02 10*3/mm3      Immature Grans, Absolute 0.17 (H) 10*3/mm3      nRBC 0.4 (H) /100 WBC     Lactic Acid, Plasma [[id number]]  (Abnormal) Collected:  08/20/17 1220    Specimen:  Blood Updated:  08/20/17 1245     Lactate 3.3 (C) mmol/L     Protime-INR [[id number]]  (Abnormal) Collected:  08/20/17 1220    Specimen:  Blood Updated:  08/20/17 1248     Protime 24.3 (H) Seconds      INR 2.27 (H)    aPTT [[id number]]  (Normal) Collected:  08/20/17 1220    Specimen:  Blood Updated:  08/20/17 1248     PTT 28.9 seconds     Calcium, Ionized [[id number]]  (Normal) Collected:  08/20/17 1220    Specimen:  Blood Updated:  08/20/17 1258     Ionized Calcium 1.22 mmol/L      Ionized Calcium 4.9 mg/dL     Magnesium [[id number]]  (Normal) Collected:  08/20/17 1220    Specimen:  Blood Updated:  08/20/17 1258     Magnesium 2.0 mg/dL     Phosphorus [[id number]]  (Normal) Collected:  08/20/17 1220    Specimen:  Blood Updated:  08/20/17 1258     Phosphorus 3.5 mg/dL     Comprehensive Metabolic Panel [[id number]]  (Abnormal) Collected:  08/20/17 1220    Specimen:  Blood Updated:  08/20/17 1258     Glucose 134 (H) mg/dL      BUN 33 (H) mg/dL      Creatinine 5.90 (H) mg/dL      Sodium 129 (L) mmol/L      Potassium 5.0 mmol/L      Chloride 88 (L) mmol/L      CO2 19.5 (L) mmol/L      Calcium 10.2 mg/dL      Total Protein 7.1 g/dL      Albumin 3.70 g/dL      ALT (SGPT) 18 U/L      AST (SGOT) 31 U/L      Alkaline Phosphatase 141 (H) U/L      Total Bilirubin 2.7 (H) mg/dL      eGFR  African Amer 9 (L) mL/min/1.73      Globulin 3.4 gm/dL      A/G Ratio 1.1 g/dL       "BUN/Creatinine Ratio 5.6 (L)     Anion Gap 21.5 mmol/L     C-reactive Protein [[id number]]  (Abnormal) Collected:  08/20/17 1220    Specimen:  Blood Updated:  08/20/17 1258     C-Reactive Protein 12.16 (H) mg/dL     Blood Gas, Arterial [[id number]]  (Abnormal) Collected:  08/20/17 1257    Specimen:  Arterial Blood Updated:  08/20/17 1259     Site Arterial: left brachial     Tu's Test N/A     pH, Arterial 7.390 pH units      pCO2, Arterial 30.5 (L) mm Hg      pO2, Arterial 87.9 mm Hg      HCO3, Arterial 18.4 (L) mmol/L      Base Excess, Arterial -5.4 (L) mmol/L      O2 Saturation Calculated 96.8 %      Barometric Pressure for Blood Gas 753.3 mmHg      Modality Cannula     Flow Rate 3 lpm      Set Mec Resp Rate 18    Narrative:       Meter: [id number] : [id number] Keyon Nora    Procalcitonin [[id number]]  (Abnormal) Collected:  08/20/17 1220    Specimen:  Blood Updated:  08/20/17 1310     Procalcitonin 3.68 (C) ng/mL     Narrative:       As a Marker for Sepsis (Non-Neonates):   1. <0.5 ng/mL represents a low risk of severe sepsis and/or septic shock.  1. >2 ng/mL represents a high risk of severe sepsis and/or septic shock.    As a Marker for Lower Respiratory Tract Infections that require antibiotic therapy:  PCT on Admission     Antibiotic Therapy             6-12 Hrs later  > 0.5                Strongly Recommended            >0.25 - <0.5         Recommended  0.1 - 0.25           Discouraged                   Remeasure/reassess PCT  <0.1                 Strongly Discouraged          Remeasure/reassess PCT      As 28 day mortality risk marker: \"Change in Procalcitonin Result\" (> 80 % or <=80 %) if Day 0 (or Day 1) and Day 4 values are available. Refer to http://www.[employer]-pct-calculator.com/   Change in PCT <=80 %   A decrease of PCT levels below or equal to 80 % defines a positive change in PCT test result representing a higher risk for 28-day all-cause mortality of patients diagnosed with severe sepsis or " septic shock.  Change in PCT > 80 %   A decrease of PCT levels of more than 80 % defines a negative change in PCT result representing a lower risk for 28-day all-cause mortality of patients diagnosed with severe sepsis or septic shock.                Blood Culture [[id number]] Collected:  08/20/17 1327    Specimen:  Blood from Arm, Left Updated:  08/20/17 1334    [place] Draw [[id number]] Collected:  08/20/17 1220    Specimen:  Blood Updated:  08/20/17 1401    Narrative:       The following orders were created for panel order [place] Draw.  Procedure                               Abnormality         Status                     ---------                               -----------         ------                     Light Blue Top[[id number]]                                   Final result               Green Top (Gel)[[id number]]                                  Final result               Lavender Top[[id number]]                                     Final result               Gold Top - SST[[id number]]                                                                Please view results for these tests on the individual orders.    Light Blue Top [[id number]] Collected:  08/20/17 1220    Specimen:  Blood Updated:  08/20/17 1401     Extra Tube hold for add-on      Auto resulted       Green Top (Gel) [[id number]] Collected:  08/20/17 1220    Specimen:  Blood Updated:  08/20/17 1401     Extra Tube Hold for add-ons.      Auto resulted.       Lavender Top [[id number]] Collected:  08/20/17 1220    Specimen:  Blood Updated:  08/20/17 1401     Extra Tube hold for add-on      Auto resulted       POC Glucose Fingerstick [[id number]]  (Normal) Collected:  08/20/17 1510    Specimen:  Blood Updated:  08/20/17 1512     Glucose 109 mg/dL     Narrative:       Meter: [id number] : [id number] Alie RAMAN          Lab Results   Component Value Date    CALCIUM 10.2 08/20/2017    PHOS 3.5 08/20/2017     Results from last 7 days  Lab Units  08/20/17  1220   MAGNESIUM mg/dL 2.0   SODIUM mmol/L 129*   POTASSIUM mmol/L 5.0   CHLORIDE mmol/L 88*   CO2 mmol/L 19.5*   BUN mg/dL 33*   CREATININE mg/dL 5.90*   GLUCOSE mg/dL 134*   CALCIUM mg/dL 10.2   WBC 10*3/mm3 22.17*   HEMOGLOBIN g/dL 12.9   PLATELETS 10*3/mm3 198   ALT (SGPT) U/L 18   AST (SGOT) U/L 31   PROCALCITONIN ng/mL 3.68*     Lab Results   Component Value Date    CKTOTAL 79 02/25/2017    TROPONINT 0.929 (C) 07/18/2017               Results from last 7 days  Lab Units 08/20/17  1220   PROCALCITONIN ng/mL 3.68*   LACTATE mmol/L 3.3*         Results from last 7 days  Lab Units 08/20/17  1220   INR  2.27*     Lab Results   Component Value Date    TSH 2.220 03/29/2017     Estimated Creatinine Clearance: 11.3 mL/min (by C-G formula based on Cr of 5.9).    I directly visualized the images of the CT scan of the chest, abdomen and pelvis.  She has interstitial prominence and vascular prominence of both lungs suggesting fluid overload.  She has a very large heart with cardiomegaly and a pacemaker in place.  She has heavily calcified coronaries.  She has a very large pulmonary artery trunk suggesting long-standing pulmonary hypertension.  She has some mild dependent atelectasis and very small pleural effusions bilaterally.  She has some mesenteric edema in the abdomen.      ASSESSMENT:  Sepsis  Fluid overload  Pulmonary edema  Chronic diastolic heart failure with acute exacerbation  Severe left ventricular hypertrophy  Severe Secondary pulmonary hypertension  Acute hypoxic respiratory failure  End-stage renal disease on hemodialysis  Hyponatremia  Tracheobronchomalacia  Diabetes type 2      PLAN:  This patient is critically ill.  She has life-threatening severe disease, with sepsis.  She will be admitted to the intensive care unit.  Central line will be placed and we will begin administering intravenous vaso- pressors due to persistent hypotension.  She will receive vancomycin and Zosyn for sepsis treatment.   We will consult nephrology for hemodialysis with perhaps removal of a few pounds of fluid.  Hopefully the hemodialysis will unload her heart and help her blood pressure and cardiac output.  She does have chronic diastolic dysfunction, due to very thick left ventricle with diastolic dysfunction, causing secondary pulmonary hypertension.  She is currently requiring supplemental oxygen.  We may have to put this patient on noninvasive bilevel positive pressure ventilation such as BiPAP to help her respiratory status.  She will receive Lovenox for DVT prophylaxis.  She will receive Pepcid for stress ulcer prophylaxis.  Glucose will be controlled on ICU protocol.    Total critical care time 40 minutes, excluding any separately billable procedure time      Aj Saavedra MD  8/20/2017  3:47 PM

## 2017-08-20 NOTE — PROGRESS NOTES
Attempted to get abg mulitple times patient has been with RN, Radiology and now wants to use the restroom. I will check back with patient.

## 2017-08-20 NOTE — ED NOTES
Nursing home reports AMS, rapid pulse, fever that they noticed at 8am while helping pt out of the floor after she fell in her bedroom.      Bessy Banuelos RN  08/20/17 4635

## 2017-08-20 NOTE — ED PROVIDER NOTES
EMERGENCY DEPARTMENT ENCOUNTER    CHIEF COMPLAINT  Chief Complaint: AMS  History given by: Nurse  History limited by: Altered mental status  Room Number: 26/26  PMD: Yamilet Zurita MD      HPI:  Pt is a 72 y.o. female who presents to the ED with reported AMS. Per NH, the pt was noted to have a fever and AMS at 0800 this morning while staff was helping the pt off of the floor after she fell in the bathroom. Pt is on coumadin for a-fib    Duration:  unknown  Onset: unknown  Timing: constant  Intensity/Severity: moderate  Progression: unknown  Associated Symptoms: fever  Aggravating Factors: unknown  Alleviating Factors: unknown  Previous Episodes: unknown  Treatment before arrival: none    PAST MEDICAL HISTORY  Active Ambulatory Problems     Diagnosis Date Noted   • Benign essential hypertension 12/14/2016   • Congestive heart failure 12/14/2016   • Chronic kidney disease, stage IV (severe) 12/14/2016   • Chronic obstructive pulmonary disease 12/14/2016   • Primary hypertrophic cardiomyopathy 12/14/2016   • History of aortic valve replacement 10/19/2016   • Imbalance 02/24/2017   • End stage renal failure on dialysis 02/24/2017   • Type 2 diabetes mellitus 02/24/2017   • Chronic a-fib 02/24/2017   • Dizziness 02/25/2017   • Bleeding internal hemorrhoids 09/29/2015   • Hematochezia 06/04/2016   • Systemic infection 07/14/2016   • Calciphylaxis 07/01/2016   • Cellulitis of lower extremity 09/24/2016   • Centrilobular emphysema 02/22/2017   • Chronic diastolic heart failure 11/06/2014   • Chronic obstructive bronchitis 01/20/2016   • Multiple-type hyperlipidemia 02/22/2017   • Dependence on renal dialysis 01/28/2014   • Depression 05/15/2013   • Diabetic hypoglycemia 07/14/2016   • Diabetic peripheral neuropathy 01/20/2016   • Edema 11/14/2012   • History of prosthetic heart valve 01/20/2016   • Long term current use of anticoagulant 02/22/2017   • Long term current use of insulin 01/28/2014   • Major depressive  disorder with single episode 08/11/2014   • Nonsustained ventricular tachycardia 01/20/2016   • Obstructive sleep apnea syndrome 05/15/2013   • Periumbilic abdominal tenderness 06/04/2016   • Pulmonary hypertension 05/15/2013   • Automatic implantable cardioverter-defibrillator in situ 03/15/2017   • Skin ulcer 07/01/2016   • Shortness of breath 11/14/2012   • Chronic fatigue 03/29/2017   • Cardiogenic shock 07/12/2017   • Acute respiratory failure with hypoxia 07/12/2017   • Pulmonary edema 07/12/2017   • Hypocalcemia 07/13/2017   • Metabolic acidosis 07/13/2017     Resolved Ambulatory Problems     Diagnosis Date Noted   • Diarrhea 02/24/2017   • Body mass index (BMI) of 40.0-44.9 in adult 01/28/2014   • Cough 11/14/2012   • Transient alteration of awareness 07/14/2016   • Generalized weakness 04/29/2017     Past Medical History:   Diagnosis Date   • Acute diastolic CHF (congestive heart failure)    • Anxiety    • Asthma    • Atrial fibrillation    • Chronic diastolic heart failure    • Chronic obstructive bronchitis    • COPD (chronic obstructive pulmonary disease)    • Depression    • Diabetes mellitus    • ESRD (end stage renal disease)    • Falls    • Hyperlipidemia    • Hypertension    • Long term current use of anticoagulant therapy    • Muscle weakness    • Pulmonary hypertension    • Sleep apnea    • SOB (shortness of breath)    • Tricuspid regurgitation        PAST SURGICAL HISTORY  Past Surgical History:   Procedure Laterality Date   • AORTIC VALVE REPAIR/REPLACEMENT     • CARDIAC CATHETERIZATION N/A 7/12/2017    Procedure: Right and Left Heart Cath;  Surgeon: Rizwan Saldaña MD;  Location: [facility] CATH INVASIVE LOCATION;  Service:    • CARDIAC CATHETERIZATION N/A 7/12/2017    Procedure: Left ventriculography;  Surgeon: Rizwan Saldaña MD;  Location: [facility] CATH INVASIVE LOCATION;  Service:    • CARDIAC CATHETERIZATION N/A 7/12/2017    Procedure: Aortic root aortogram;  Surgeon: Rizwan Saldaña MD;   Location: [facility] INVASIVE LOCATION;  Service:    • CARDIAC DEFIBRILLATOR PLACEMENT Left    • DIALYSIS FISTULA CREATION Right    • LAPAROSCOPIC CHOLECYSTECTOMY     • PARTIAL HYSTERECTOMY         FAMILY HISTORY  Family History   Problem Relation Age of Onset   • Hypertension Mother    • Lung cancer Mother    • Heart attack Father    • Hypertension Father    • Breast cancer Sister    • Anxiety disorder Sister    • Alcohol abuse Brother        SOCIAL HISTORY  Social History     Social History   • Marital status:      Spouse name: N/A   • Number of children: N/A   • Years of education: N/A     Occupational History   • Not on file.     Social History Main Topics   • Smoking status: Former Smoker   • Smokeless tobacco: Not on file   • Alcohol use No   • Drug use: No   • Sexual activity: Defer     Other Topics Concern   • Not on file     Social History Narrative       ALLERGIES  Review of patient's allergies indicates no known allergies.    REVIEW OF SYSTEMS  Review of Systems   Unable to perform ROS: Mental status change   Constitutional: Positive for fever (per NH).   Psychiatric/Behavioral:        AMS per NH       PHYSICAL EXAM  ED Triage Vitals   Temp Heart Rate Resp BP SpO2   08/20/17 1129 08/20/17 1129 08/20/17 1129 08/20/17 1132 08/20/17 1131   101.8 °F (38.8 °C) 160 30 77/28 97 %      Temp src Heart Rate Source Patient Position BP Location FiO2 (%)   -- -- -- -- --              Physical Exam   Constitutional: She appears distressed.   HENT:   Head: Normocephalic.   Eyes: EOM are normal. Pupils are equal, round, and reactive to light.   Neck: Normal range of motion. Neck supple.   Cardiovascular: Normal heart sounds.  An irregularly irregular rhythm present. Tachycardia present.    Pulmonary/Chest: Tachypnea noted. She has rhonchi (bilaterally).   Abdominal: Soft. There is no tenderness. There is no rebound and no guarding.   Musculoskeletal: Normal range of motion. She exhibits no edema.   AV fistula in  RUE with a palpable thrill   Skin: Skin is warm and dry. No rash noted.   Nursing note and vitals reviewed.      LAB RESULTS  Lab Results (last 24 hours)     Procedure Component Value Units Date/Time    POC Glucose Fingerstick [[id number]]  (Normal) Collected:  08/20/17 1140    Specimen:  Blood Updated:  08/20/17 1142     Glucose 130 mg/dL     Narrative:       Meter: [id number] : [id number] [facility]es [facility]    Protime-INR [[id number]]  (Abnormal) Collected:  08/20/17 1220    Specimen:  Blood Updated:  08/20/17 1248     Protime 24.3 (H) Seconds      INR 2.27 (H)    CBC & Differential [[id number]] Collected:  08/20/17 1220    Specimen:  Blood Updated:  08/20/17 1235    Narrative:       The following orders were created for panel order CBC & Differential.  Procedure                               Abnormality         Status                     ---------                               -----------         ------                     CBC Auto Differential[[id number]]        Abnormal            Final result                 Please view results for these tests on the individual orders.    Comprehensive Metabolic Panel [[id number]]  (Abnormal) Collected:  08/20/17 1220    Specimen:  Blood Updated:  08/20/17 1258     Glucose 134 (H) mg/dL      BUN 33 (H) mg/dL      Creatinine 5.90 (H) mg/dL      Sodium 129 (L) mmol/L      Potassium 5.0 mmol/L      Chloride 88 (L) mmol/L      CO2 19.5 (L) mmol/L      Calcium 10.2 mg/dL      Total Protein 7.1 g/dL      Albumin 3.70 g/dL      ALT (SGPT) 18 U/L      AST (SGOT) 31 U/L      Alkaline Phosphatase 141 (H) U/L      Total Bilirubin 2.7 (H) mg/dL      eGFR  African Amer 9 (L) mL/min/1.73      Globulin 3.4 gm/dL      A/G Ratio 1.1 g/dL      BUN/Creatinine Ratio 5.6 (L)     Anion Gap 21.5 mmol/L     aPTT [[id number]]  (Normal) Collected:  08/20/17 1220    Specimen:  Blood Updated:  08/20/17 1248     PTT 28.9 seconds     Magnesium [[id number]]  (Normal) Collected:  08/20/17 1220    Specimen:  " Blood Updated:  08/20/17 1258     Magnesium 2.0 mg/dL     Phosphorus [[id number]]  (Normal) Collected:  08/20/17 1220    Specimen:  Blood Updated:  08/20/17 1258     Phosphorus 3.5 mg/dL     Calcium, Ionized [[id number]]  (Normal) Collected:  08/20/17 1220    Specimen:  Blood Updated:  08/20/17 1258     Ionized Calcium 1.22 mmol/L      Ionized Calcium 4.9 mg/dL     Lactic Acid, Plasma [[id number]]  (Abnormal) Collected:  08/20/17 1220    Specimen:  Blood Updated:  08/20/17 1245     Lactate 3.3 (C) mmol/L     C-reactive Protein [[id number]]  (Abnormal) Collected:  08/20/17 1220    Specimen:  Blood Updated:  08/20/17 1258     C-Reactive Protein 12.16 (H) mg/dL     Blood Culture [[id number]] Collected:  08/20/17 1220    Specimen:  Blood from Arm, Left Updated:  08/20/17 1225    Procalcitonin [[id number]]  (Abnormal) Collected:  08/20/17 1220    Specimen:  Blood Updated:  08/20/17 1310     Procalcitonin 3.68 (C) ng/mL     Narrative:       As a Marker for Sepsis (Non-Neonates):   1. <0.5 ng/mL represents a low risk of severe sepsis and/or septic shock.  1. >2 ng/mL represents a high risk of severe sepsis and/or septic shock.    As a Marker for Lower Respiratory Tract Infections that require antibiotic therapy:  PCT on Admission     Antibiotic Therapy             6-12 Hrs later  > 0.5                Strongly Recommended            >0.25 - <0.5         Recommended  0.1 - 0.25           Discouraged                   Remeasure/reassess PCT  <0.1                 Strongly Discouraged          Remeasure/reassess PCT      As 28 day mortality risk marker: \"Change in Procalcitonin Result\" (> 80 % or <=80 %) if Day 0 (or Day 1) and Day 4 values are available. Refer to http://[email or web address] [email or web address]-pct-calculator.[email or web address]/   Change in PCT <=80 %   A decrease of PCT levels below or equal to 80 % defines a positive change in PCT test result representing a higher risk for 28-day all-cause mortality of patients diagnosed with severe sepsis or septic " shock.  Change in PCT > 80 %   A decrease of PCT levels of more than 80 % defines a negative change in PCT result representing a lower risk for 28-day all-cause mortality of patients diagnosed with severe sepsis or septic shock.                CBC Auto Differential [[id number]]  (Abnormal) Collected:  08/20/17 1220    Specimen:  Blood Updated:  08/20/17 1235     WBC 22.17 (H) 10*3/mm3      RBC 4.30 10*6/mm3      Hemoglobin 12.9 g/dL      Hematocrit 38.9 %      MCV 90.5 fL      MCH 30.0 pg      MCHC 33.2 g/dL      RDW 18.9 (H) %      RDW-SD 61.4 (H) fl      MPV 10.6 fL      Platelets 198 10*3/mm3      Neutrophil % 95.5 (H) %      Lymphocyte % 0.9 (L) %      Monocyte % 2.7 (L) %      Eosinophil % 0.0 (L) %      Basophil % 0.1 %      Immature Grans % 0.8 (H) %      Neutrophils, Absolute 21.19 (H) 10*3/mm3      Lymphocytes, Absolute 0.20 (L) 10*3/mm3      Monocytes, Absolute 0.59 10*3/mm3      Eosinophils, Absolute 0.00 10*3/mm3      Basophils, Absolute 0.02 10*3/mm3      Immature Grans, Absolute 0.17 (H) 10*3/mm3      nRBC 0.4 (H) /100 WBC     Blood Gas, Arterial [[id number]]  (Abnormal) Collected:  08/20/17 1257    Specimen:  Arterial Blood Updated:  08/20/17 1259     Site Arterial: left brachial     Tu's Test N/A     pH, Arterial 7.390 pH units      pCO2, Arterial 30.5 (L) mm Hg      pO2, Arterial 87.9 mm Hg      HCO3, Arterial 18.4 (L) mmol/L      Base Excess, Arterial -5.4 (L) mmol/L      O2 Saturation Calculated 96.8 %      Barometric Pressure for Blood Gas 753.3 mmHg      Modality Cannula     Flow Rate 3 lpm      Set [facility] Resp Rate 18    Narrative:       Meter: [id number] : [id number] Keyon Melendez    Blood Culture [[id number]] Collected:  08/20/17 1327    Specimen:  Blood from Arm, Left Updated:  08/20/17 1334          I ordered the above labs and reviewed the results    RADIOLOGY  CT Head Without Contrast   Final Result   There is no evidence for an acute intracranial abnormality. Findings are    consistent with moderate chronic small vessel ischemic change over the   cerebral white matter are noted.       This report was finalized on 8/20/2017 5:13 PM by Dr. Freddy Forte MD.          XR Chest 1 View   Final Result   Stable cardiomegaly with scattered interstitial scarring   throughout both lungs. No evidence for superimposed acute process is   appreciated.       This report was finalized on 8/20/2017 4:35 PM by Dr. Freddy Forte MD.          CT Chest Without Contrast   Final Result           1. Evidence of tracheobronchomalacia that may be clinically significant.     Moderate cardiomegaly. Dilated ascending aorta and central pulmonary   arteries. Nonspecific prominent mediastinal lymph node, suggest clinical   correlation and follow-up/further evaluation as indicated.       2. Findings suggesting fluid overload, including anasarca, small   bilateral pleural effusions, mild pericardial effusion, mild diffuse   mesenteric edema small abdominal and pelvic ascites, appearance of mild   pulmonary edema.       3. No focal acute inflammatory process of bowel is identified, limited   as described, follow-up as indications persist. Small free fluid in the   pelvis.       4. No obstructive uropathy.       Discussed by telephone with the patient's nurse, Avril, at time of   interpretation, 1528, 8/20/2017.               This report was finalized on 8/20/2017 3:32 PM by Dr. Mg Lorenzana MD.          CT Abdomen Pelvis Without Contrast   Final Result           1. Evidence of tracheobronchomalacia that may be clinically significant.     Moderate cardiomegaly. Dilated ascending aorta and central pulmonary   arteries. Nonspecific prominent mediastinal lymph node, suggest clinical   correlation and follow-up/further evaluation as indicated.       2. Findings suggesting fluid overload, including anasarca, small   bilateral pleural effusions, mild pericardial effusion, mild diffuse   mesenteric edema small  abdominal and pelvic ascites, appearance of mild   pulmonary edema.       3. No focal acute inflammatory process of bowel is identified, limited   as described, follow-up as indications persist. Small free fluid in the   pelvis.       4. No obstructive uropathy.       Discussed by telephone with the patient's nurse, Avril, at time of   interpretation, 1528, 8/20/2017.               This report was finalized on 8/20/2017 3:32 PM by Dr. Mg Lorenzana MD.                I ordered the above noted radiological studies. Interpreted by radiologist. Reviewed by me in PACS.       PROCEDURES  Critical Care  Performed by: CRISTINA LAM  Authorized by: CRISTINA LAM   Total critical care time: 45 minutes  Critical care time was exclusive of separately billable procedures and treating other patients.  Critical care was necessary to treat or prevent imminent or life-threatening deterioration of the following conditions: sepsis.  Critical care was time spent personally by me on the following activities: blood draw for specimens, development of treatment plan with patient or surrogate, discussions with consultants, interpretation of cardiac output measurements, evaluation of patient's response to treatment, examination of patient, obtaining history from patient or surrogate, ordering and performing treatments and interventions, ordering and review of laboratory studies, ordering and review of radiographic studies, pulse oximetry, re-evaluation of patient's condition and review of old charts.           EKG           EKG time: 1205  Rhythm/Rate: afib, 155  QRS, axis: RBBB   ST and T waves: nonspecific T wave changes     Interpreted Contemporaneously by me, independently viewed  changed compared to prior 7/18/17 (rate is significantly faster)    PROGRESS AND CONSULTS  ED Course     1147 - 30mL/kg bolus of IVF ordered for sepsis and tylenol ordered for fever. CXR and lab work ordered for further evaluation.    1201 - Zosyn and  vancomycin ordered for sepsis    1303 - Placed call to pulmonology for admission    1306- Rechecked pt. Pt's son is at bedside. Notfied pt's son of the pt's lab and imaging results, including the pt's elevated lactic acid and elevated WBC count. Pt's son clarified that the pt does not make urine and is more alert and communicative at baseline. Pt's son states that the pt did fall yesterday as well yesterday. Discussed the plan to order a CT Head since the pt's BP has improved and to admit the pt to the ICU for IV abx. Pt's son agrees with the plan and all questions were addressed.  BE=8810, FU=547/72    1308 - CT head ordered for further evaluation.    1317 - Consulted with Dr. Rizvi (pulmonology) who agreed to admit the pt to ICU.    1422- I was called back to the pt's room due to BP=99/81 and HR increased to 130-140. On re-exam, the pt's lung sounds are unchanged but the pt is now more alert. The pt is now complaining of nausea. Ordered zofran for nausea.    1432- Rechecked pt. Pt is now complaining of left lower abd pain. HW=840-086's and BP=81/62.     1434- Ordered levophed for hypotension and CT Chest and CT Abd/Pelvis for further evaluation.       MEDICAL DECISION MAKING  Results were reviewed/discussed with the patient and they were also made aware of online access. Pt also made aware that some labs, such as cultures, will not be resulted during ER visit and follow up with PMD is necessary.     MDM  Number of Diagnoses or Management Options  End stage renal disease:   Metabolic encephalopathy:   Sepsis, due to unspecified organism:      Amount and/or Complexity of Data Reviewed  Clinical lab tests: ordered and reviewed (WBC - 22.17, Lactate - 3.3, Procalcitonin - 3.68)  Tests in the radiology section of CPT®: ordered and reviewed (CXR shows stable cardiomegaly with scattered interstitial scarring throughout both lungs. CT Head shows nothing acute)  Tests in the medicine section of CPT®: ordered and reviewed  (See EKG note)  Decide to obtain previous medical records or to obtain history from someone other than the patient: yes  Review and summarize past medical records: yes (Per [facility] records, the pt was last dialyzed yesterday. Pt was last seen in the ED on 8-7-17 for scalp abrasion s/p fall. Pt was last admitted in July for respiratory failure. Pt was intubated at that time and coded during a cardiac catheterization.)  Discuss the patient with other providers: yes (Dr. Rizvi (pulmonology))  Independent visualization of images, tracings, or specimens: yes    Critical Care  Total time providing critical care: 30-74 minutes         DIAGNOSIS  Final diagnoses:   Sepsis, due to unspecified organism   Metabolic encephalopathy   End stage renal disease       DISPOSITION  ADMISSION    Discussed treatment plan and reason for admission with pt/family and admitting physician.  Pt/family voiced understanding of the plan for admission for further testing/treatment as needed.         Latest Documented Vital Signs:  As of 2:33 PM  BP- 99/81 HR- (!) 141 Temp- 99.8 °F (37.7 °C) (Tympanic) O2 sat- 96%    --  Documentation assistance provided by earlene Sanchez and Jeremias Hernandez for Dr. Flores.  Information recorded by the essence was done at my direction and has been verified and validated by me.     Jeremias Hernandez  08/20/17 1438       Vivian Sanchez  08/20/17 1921       Steve Flores MD  08/21/17 1311

## 2017-08-20 NOTE — PLAN OF CARE
Problem: Patient Care Overview (Adult)  Goal: Plan of Care Review  Outcome: Ongoing (interventions implemented as appropriate)    08/20/17 1946   Coping/Psychosocial Response Interventions   Plan Of Care Reviewed With patient   Patient Care Overview   Progress progress toward functional goals as expected   Outcome Evaluation   Outcome Summary/Follow up Plan pt arrived tachypenic and anxious, started on levo and cardizeme, central access inserted in the femoral vein, dialysis initiated         Problem: Fall Risk (Adult)  Goal: Identify Related Risk Factors and Signs and Symptoms  Outcome: Outcome(s) achieved Date Met:  08/20/17  Goal: Absence of Falls  Outcome: Ongoing (interventions implemented as appropriate)    Problem: Skin Integrity Impairment, Risk/Actual (Adult)  Goal: Identify Related Risk Factors and Signs and Symptoms  Outcome: Outcome(s) achieved Date Met:  08/20/17  Goal: Skin Integrity/Wound Healing  Outcome: Ongoing (interventions implemented as appropriate)    Problem: Wound, Traumatic, Nonburn (Adult)  Goal: Signs and Symptoms of Listed Potential Problems Will be Absent or Manageable (Wound, Traumatic, Nonburn)  Outcome: Outcome(s) achieved Date Met:  08/20/17    Problem: Fluid Volume Excess (Adult,Obstetrics,Pediatric)  Goal: Identify Related Risk Factors and Signs and Symptoms  Outcome: Outcome(s) achieved Date Met:  08/20/17  Goal: Stable Weight  Outcome: Ongoing (interventions implemented as appropriate)  Goal: Balanced Intake/Output  Outcome: Ongoing (interventions implemented as appropriate)    Problem: Chronic Kidney Disease/End Stage Renal Disease (Adult)  Goal: Signs and Symptoms of Listed Potential Problems Will be Absent or Manageable (Chronic Kidney Disease/End Stage Renal Disease)  Outcome: Outcome(s) achieved Date Met:  08/20/17

## 2017-08-21 NOTE — PROGRESS NOTES
Discharge Planning Assessment  [facility]     Patient Name: Keisha Nelson  MRN: [id number]  Today's Date: 8/21/2017    Admit Date: 8/20/2017          Discharge Needs Assessment       08/21/17 1623    Living Environment    Lives With other (see comments)    Living Arrangements extended care facility    Home Accessibility no concerns    Stair Railings at Home none    Type of Financial/Environmental Concern none    Transportation Available family or friend will provide    Living Environment Comment From [facility] short term rehab.  Will return pending bed availability    Living Environment    Provides Primary Care For no one, unable/limited ability to care for self    Primary Care Provided By other (see comments)    Quality Of Family Relationships supportive    Able to Return to Prior Living Arrangements yes    Discharge Needs Assessment    Concerns To Be Addressed discharge planning concerns    Readmission Within The Last 30 Days current reason for admission unrelated to previous admission    Anticipated Changes Related to Illness inability to care for self    Equipment Currently Used at Home walker, rolling    Equipment Needed After Discharge none    Discharge Facility/Level Of Care Needs nursing facility, skilled    Current Discharge Risk dependent with mobility/activities of daily living    Discharge Disposition still a patient            Discharge Plan       08/21/17 1620    Case Management/Social Work Plan    Plan Return to [facility], skilled    Patient/Family In Agreement With Plan yes    Additional Comments Spoke with pt's daughter Becka Nelson by phone.  Introduced self and explained role.  CCP contact information provided.  Face sheet verified and IMM received.  Pt is admitted from rehab at [facility].  She was also having her HD there on Tues, Thrs, Saturday.  Her daughter states that prior to her last hospitalization, pt was living alone and mostly independent.  The plan is for her  to return to rehab and when ready, she will return home with assistance.  Call to [facility] and she states that pt is skilled, no bed hold but may return pending bed availability.  Pt was not able to walk yet at the NH but was getting up to the chair.  CCP will follow.        Discharge Placement     Facility/Agency Request Status Selected? Address Phone Number Fax Number    [facility] Pending - Request Sent     [id number] [address] [phone]        Nuha Mccall RN 8/21/2017 09:45    email sent to [facility].  Await answer.                           Demographic Summary       08/21/17 1607    Referral Information    Admission Type inpatient    Arrived From skilled nursing facility    Primary Care Physician Information    Name Yamilet Zurita MD            Functional Status       08/21/17 1608    Functional Status Prior    Ambulation 4-->completely dependent    Transferring 3-->assistive equipment and person    Toileting 3-->assistive equipment and person    Bathing 2-->assistive person    Dressing 2-->assistive person    Eating 0-->independent    Communication 0-->understands/communicates without difficulty    Swallowing 0-->swallows foods/liquids without difficulty    IADL    Medications assistive person    Meal Preparation completely dependent    Housekeeping completely dependent    Laundry completely dependent    Shopping completely dependent    Oral Care independent;assistive person            Psychosocial     None            Abuse/Neglect     None            Legal     None            Substance Abuse     None            Patient Forms     None          Nuha Mccall RN

## 2017-08-21 NOTE — PROGRESS NOTES
"Pharmacokinetic Consult - Intermittent Vancomycin Dosing (Initial Note)    Keisha Nelson has been consulted for pharmacy to dose vancomycin for sepsis.  Pharmacy dosing vancomycin per Dr Saavedra's request.      Other antimicrobials: Zosyn    Relevant clinical data and objective history reviewed:  72 y.o. female 66\" (167.6 cm) 250 lb (113 kg)    Past Medical History:   Diagnosis Date   • Acute diastolic CHF (congestive heart failure)    • Anxiety    • Asthma    • Atrial fibrillation     chronic    • Chronic diastolic heart failure    • Chronic obstructive bronchitis    • COPD (chronic obstructive pulmonary disease)    • Depression    • Diabetes mellitus    • ESRD (end stage renal disease)    • Falls    • Hyperlipidemia    • Hypertension    • Long term current use of anticoagulant therapy    • Muscle weakness    • Pulmonary hypertension    • Sleep apnea    • SOB (shortness of breath)    • Tricuspid regurgitation      Creatinine   Date Value Ref Range Status   08/20/2017 5.90 (H) 0.57 - 1.00 mg/dL Final   07/21/2017 4.41 (H) 0.57 - 1.00 mg/dL Final   07/20/2017 5.35 (H) 0.57 - 1.00 mg/dL Final     BUN   Date Value Ref Range Status   08/20/2017 33 (H) 8 - 23 mg/dL Final     ESRD, on hemodialysis    Lab Results   Component Value Date    WBC 22.17 (H) 08/20/2017     Temp Readings from Last 3 Encounters:   08/20/17 98.9 °F (37.2 °C) (Oral)   08/07/17 97.8 °F (36.6 °C) (Oral)   07/21/17 97.5 °F (36.4 °C) (Oral)      Baseline culture/source/susceptibility:   8/20 BC x2= in progress.  8/20 CXR= in progress     Assessment/Plan  Will start vancomycin 2250mg IV x1 given 8/20 at 1316. Vancomycin random level in am. Pharmacy will continue to follow daily while on vancomycin and adjust as needed.     Edilma Schwarz [facility]  "

## 2017-08-21 NOTE — CONSULTS
Patient Name: Keisha Nelson  Age/Sex: 72 y.o. female  : 1945  MRN: [id number]    Date of Admission: 2017  Date of Encounter Visit: 17  Encounter Provider: Ricky Rogers MD  Place of Service: [facility]      Referring Provider: Roberto Rizvi MD  Patient Care Team:  Yamilet Zurita MD as PCP - General (Family Medicine)    Subjective:     Consulted for: Afib    Chief Complaint: altered mental status, fall       History of Present Illness:  Keisha Nelson is a 72 y.o. female with pulmonary HTN, sleep apnea, COPD, DM, ESRD on dialysis, prior mechanical AVR, atrial fibrillation, and hypotension on midodrine. She has a [employer] AICD. She is on warfarin. She had normal LV systolic function in 2017. She was first seen by Dr. Johnson in 2017 for a second opinion. She was significantly short of breath at the time and was referred for left and right cardiac catheterization. She had a wedge pressure of 21 with a pulmonary pressure of 55/24. She had no significant coronary disease. A TTE was obtained and showed decreased LV systolic function with EF 35% and severe pulmonary HTN and severe TR. She was tachycardic and this was felt to be tachy mediated. Post cath- she had a respiratory arrest and was intubated and monitored in the intensive care unit. She was discharged to rehab on . She followed up with Dr. Degroot on  for tachycardia, intolerant to rate controlling medications due to hypotension. No procedure was recommended due to the patient's frailty and multiple life limiting medical conditions.     Patient presented to the emergency room yesterday with altered mental status and fall. Per the patient's family member the altered status was short in duration will over the past day.  The fall was at 8:00 in the morning.  No loss of consciousness.  Denied any tachycardia or chest pain, however history is unreliable.  She was hypotensive  and hypoxic. She met sepsis criteria and was admitted to the CCU in critical condition.  She is on levophed and cardizem. Her INR is therapeutic. CT of the head showed no acute abnormality. Lactic acid is 2.4. We have been asked to see for tachycardia.      Previous testing:   Echo 7/12/17  · Mild mitral valve regurgitation is present  · Moderately reduced right ventricular systolic function noted.  · Severe tricuspid valve regurgitation is present.  · Left ventricular wall thickness is consistent with moderate-to-severe concentric hypertrophy.  · Left ventricular systolic function is moderately decreased. Estimated EF = 35%.  · Estimated right ventricular systolic pressure from tricuspid regurgitation is markedly elevated (>55 mmHg). Severe pulmonary hypertension is present.    /[facility] 7/12/17  FINDINGS:  RIGHT HEART CATHETERIZATION:  Pressures:  Right Atrial:                                         18  Right Ventricle             :                      55/12  Pulmonary Artery:                     55/24 mean 35  PCWP:                                                          21  Cardiac output                                      Cardiac index                                    LEFT HEART CATHETERIZATION:  LEFT VENTRICULOGRAPHY: We did briefly cross the aortic valve but the pressures weren't accurate as she was fairly hypotensive at that point grew across the valve less than 30 seconds  CORONARY ANGIOGRAPHY:  Left main: Densely calcified but normal  Left anterior descending: Normal proximally 10% luminal irregularities in the midportion distally it's normal the diagonals are  Ramus intermedius:Not present  Circumflex: Luminal irregularities proximally OM1 is subtotally occluded in the midportion 50% distal circumflex  RCA: Is a dominant vessel.  Mild luminal irregularities that are 40% or so proximally and 50% in the midportion     SUMMARY: Likely severe pulmonary hypertension in the setting of mildly elevated  pulmonary Wedge pressure.  Reduced LV function by echo moderate mainly nonobstructive coronary disease in the coronary vascular certainly doesn't explain all of the issues.  Persistent hypotension after respiratory arrest probable aspiration     Echo 4/30/17  · Left ventricular function is normal. Calculated EF = 50.6%. Estimated EF was in agreement with the calculated EF. Estimated EF = 51%. All left ventricular wall segments contract normally. The left ventricular cavity is small. Left ventricular wall thickness is consistent with moderate-to-severe concentric hypertrophy. Left ventricular diastolic function was unable to be assessed. Elevated left atrial pressure.  · Electronic lead present in the right ventricle.  · Left atrial cavity size is severely dilated.  · Right atrial cavity size is moderate-to-severely dilated. An electronic lead is present in the right atrium.  · There is a bioprosthetic valve present. The aortic valve peak and mean gradients are within defined limits. The prosthetic valve is normal.  · Mild mitral valve regurgitation is present. Mild mitral valve stenosis is present.  · Severe tricuspid valve regurgitation is present. Estimated right ventricular systolic pressure from tricuspid regurgitation is markedly elevated (>55 mmHg). Calculated right ventricular systolic pressure from tricuspid regurgitation is 63 mmHg.      Past Medical History:  Past Medical History:   Diagnosis Date   • Acute diastolic CHF (congestive heart failure)    • Anxiety    • Asthma    • Atrial fibrillation     chronic    • Chronic diastolic heart failure    • Chronic obstructive bronchitis    • COPD (chronic obstructive pulmonary disease)    • Depression    • Diabetes mellitus    • ESRD (end stage renal disease)    • Falls    • Hyperlipidemia    • Hypertension    • Long term current use of anticoagulant therapy    • Muscle weakness    • Pulmonary hypertension    • Sleep apnea    • SOB (shortness of breath)    •  Tricuspid regurgitation        Past Surgical History:   Procedure Laterality Date   • AORTIC VALVE REPAIR/REPLACEMENT     • CARDIAC CATHETERIZATION N/A 7/12/2017    Procedure: Right and Left Heart Cath;  Surgeon: Rizwan Saldaña MD;  Location: [facility] CATH INVASIVE LOCATION;  Service:    • CARDIAC CATHETERIZATION N/A 7/12/2017    Procedure: Left ventriculography;  Surgeon: Rizwan Saldaña MD;  Location: [facility] CATH INVASIVE LOCATION;  Service:    • CARDIAC CATHETERIZATION N/A 7/12/2017    Procedure: Aortic root aortogram;  Surgeon: Rizwan Saldaña MD;  Location: [facility] CATH INVASIVE LOCATION;  Service:    • CARDIAC DEFIBRILLATOR PLACEMENT Left    • DIALYSIS FISTULA CREATION Right    • LAPAROSCOPIC CHOLECYSTECTOMY     • PARTIAL HYSTERECTOMY         Home Medications:   Prescriptions Prior to Admission   Medication Sig Dispense Refill Last Dose   • acetaminophen (TYLENOL) 500 MG tablet Take 500 mg by mouth Every 6 (Six) Hours As Needed for Mild Pain (1-3).   Taking   • albuterol (PROVENTIL) (2.5 MG/3ML) 0.083% nebulizer solution Take 2.5 mg by nebulization Every 4 (Four) Hours As Needed for Wheezing.      • atorvastatin (LIPITOR) 20 MG tablet Take 20 mg by mouth Daily.   Taking   • B Complex-C-Folic Acid (NEPHRO-TC RX PO) Take 1 tablet by mouth Daily.   Taking   • benzonatate (TESSALON) 200 MG capsule Take 200 mg by mouth 3 (Three) Times a Day As Needed for Cough.      • budesonide-formoterol (SYMBICORT) 160-4.5 MCG/ACT inhaler Inhale 2 puffs 2 (Two) Times a Day.      • busPIRone (BUSPAR) 10 MG tablet Take 10 mg by mouth 2 (Two) Times a Day.   Taking   • cyanocobalamin (VITAMIN B-12) 500 MCG tablet Take 500 mcg by mouth Daily.   Taking   • gabapentin (NEURONTIN) 100 MG capsule Take 200 mg by mouth Daily.   Taking   • guaiFENesin (MUCINEX) 600 MG 12 hr tablet Take 1,200 mg by mouth 2 (Two) Times a Day.      • loperamide (IMODIUM) 2 MG capsule Take 2 mg by mouth 4 (Four) Times a Day As Needed for Diarrhea.   Taking   •  loratadine (CLARITIN) 10 MG tablet Take 10 mg by mouth Daily.   Taking   • LORazepam (ATIVAN) 0.5 MG tablet Take 0.5 mg by mouth Every 8 (Eight) Hours As Needed for Anxiety.      • Melatonin 10 MG tablet Take 2 tablets by mouth At Night As Needed.   Taking   • midodrine (PROAMATINE) 10 MG tablet Take 1 tablet by mouth 3 (Three) Times a Day Before Meals. 90 tablet 3 Taking   • sevelamer (RENVELA) 800 MG tablet Take 1 tablet by mouth 3 (Three) Times a Day With Meals. 90 tablet 3 Taking   • vitamin D (ERGOCALCIFEROL) 53296 UNITS capsule capsule Take 50,000 Units by mouth 1 (One) Time Per Week.   Taking   • warfarin (COUMADIN) 4 MG tablet Take 4 mg by mouth Daily.      • warfarin (COUMADIN) 5 MG tablet Take 5 mg by mouth See Admin Instructions. PER PT, TAKES 5MG ON SUN, TUES AND THURS   Taking   • warfarin (COUMADIN) 7.5 MG tablet Take 7.5 mg by mouth See Admin Instructions. PER PT, TAKES 7.5MG ON MON, WEDS, FRI, SAT   Taking       Allergies:  No Known Allergies    Past Social History:  Social History     Social History   • Marital status:      Spouse name: N/A   • Number of children: N/A   • Years of education: N/A     Occupational History   • Not on file.     Social History Main Topics   • Smoking status: Former Smoker   • Smokeless tobacco: Not on file   • Alcohol use No   • Drug use: No   • Sexual activity: Defer     Other Topics Concern   • Not on file     Social History Narrative        Past Family History:  Family History   Problem Relation Age of Onset   • Hypertension Mother    • Lung cancer Mother    • Heart attack Father    • Hypertension Father    • Breast cancer Sister    • Anxiety disorder Sister    • Alcohol abuse Brother          Review of Systems:  All systems reviewed. Pertinent positives identified in HPI. All other systems are negative.       Objective:     Objective:  Temp:  [98.9 °F (37.2 °C)-101.8 °F (38.8 °C)] 99.6 °F (37.6 °C)  Heart Rate:  [] 98  Resp:  [28-30] 28  BP:  ()/() 120/41    Intake/Output Summary (Last 24 hours) at 08/21/17 0828  Last data filed at 08/21/17 0620   Gross per 24 hour   Intake           1182.8 ml   Output             4000 ml   Net          -2817.2 ml     Body mass index is 31.46 kg/(m^2).  Last 3 weights    08/20/17  1129 08/21/17  0515   Weight: 250 lb (113 kg) 194 lb 14.2 oz (88.4 kg)           Physical Exam:   General Appearance Well developed, cooperative and well nourished and no acute distress   Head Normocephalic, without abnormality, atraumatic   Ears Ears appear intact with no abnormalities noted   Throat No oral lesions, no thrush, oral mucosa moist   Neck No adenopathy, supple, trachea midline, no thyromegaly, no carotid bruit, no JVD   Back No skin lesions, erythema or scars, no tenderness to percussion or palptaion,range of motion is normal   Lungs Clear to auscultation,respirations regular, even and unlabored   Heart Irregular rhythm.  Mildly elevated rate.  Mechanical S2.     Chest wall Sternotomy    Abdomen Normal bowel sounds, no masses, no hepatomegaly,    Extremities Moves all extremities well. 1+ bilateral lower extremity edema    Pulses Pulses palpable and equal bilaterally. Normal radial, carotid, femoral, dorsalis pedis and posterior tibial pulses bilaterally. Normal abdominal aorta   Skin No bleeding, bruising or rash   Psyhiatric Alert and oriented x 3, normal mood and affect       Lab Review:     [unfilled]    Results from last 7 days  Lab Units 08/21/17  0435 08/20/17  1220   CK TOTAL U/L 311*  --    TROPONIN T ng/mL  --  0.121*       Results from last 7 days  Lab Units 08/20/17  1220   WBC 10*3/mm3 22.17*   HEMOGLOBIN g/dL 12.9   HEMATOCRIT % 38.9   PLATELETS 10*3/mm3 198       Results from last 7 days  Lab Units 08/21/17  0435 08/20/17  1220   INR  2.68* 2.27*   APTT seconds  --  28.9           Results from last 7 days  Lab Units 08/21/17  0435   MAGNESIUM mg/dL 1.9                   Results from last 7 days  Lab  Units 08/20/17  1220   TSH mIU/mL 3.650       Imaging:    Imaging Results (most recent)     Procedure Component Value Units Date/Time    CT Chest Without Contrast [[id number]] Collected:  08/20/17 1506     Updated:  08/20/17 1536    Narrative:       CT CHEST WO CONTRAST-, CT ABDOMEN PELVIS WO CONTRAST-     INDICATIONS: Fever. Short of breath, abdominal pain     TECHNIQUE:     Unenhanced CHEST, ABDOMEN, PELVIS CT     COMPARISON: 08/31/2010, 08/17/2000     FINDINGS:      Chest CT:     The heart size is moderately enlarged with mild pericardial effusion.  Ascending aorta is dilated, 4.0 cm, previously 3.8 cm. The central  pulmonary prominent in caliber, suggesting pulmonary arterial  hypertension. For example, the pulmonary trunk is measured at 4.6 cm,  not significantly changed. Left-sided pacemaker and cardiac leads are  seen.     Enlarged mediastinal lymph noted is seen, nonspecific, could be reactive  in nature, potentially evidence of neoplasm; right paratracheal 1.5 cm  short axis lymph node on image 32 of series 1, previously 0.5 cm.     Assessment of vascular and mediastinal structures is significantly  limited without intravenous contrast material.        The airways show decreased AP dimension of the trachea and bilateral  mainstem bronchi, left more so than right suggesting acquired  tracheobronchomalacia. For example on the left, the AP dimension of the  airway is about 1 mm on image 38 of series 2. Likewise, the AP dimension  of the airway on the lower trachea is about 1 mm on image 32 of series  2. This appearance is a change from the prior exam     Small bilateral pleural effusions are present.           The lungs show no focal pulmonary consolidation or mass. Dependent  atelectasis is present. The lungs appear mildly edematous.                 Abdomen pelvis CT:     The kidneys appear atrophied. Arterial calcifications are conspicuous at  the bilateral renal sayda.     Mild perihepatic, perisplenic  ascites. Liver appears mildly enlarged.     Pancreas is thinned.     The left adrenal gland is enlarged, low-density, 3.1 CT units suggesting  presence of adenoma.     Otherwise unremarkable unenhanced appearance of the liver, spleen,  adrenal glands, pancreas, kidneys, bladder.     No bowel obstruction . Small hiatal hernia is present. Appendix does not  appear inflamed. Assessment of the colon is limited by lack of  distention, lack of enteric contrast material. Diffuse mesenteric edema  limits assessment for inflammatory changes of bowel.              No free intraperitoneal gas. Small abdominal and pelvic free fluid.     Scattered small mesenteric and para-aortic lymph nodes are seen that are  not significant by size criteria.     Abdominal aorta is not aneurysmal. Aortic and extensive other arterial  calcifications are present.     Degenerative changes are seen in the spine. No acute fracture is  identified.     A lipoma is apparent at the right abdominal wall, for example image 22  of series 1, 4 cm.     Diffuse subcutaneous edema is compatible with anasarca.             Impression:             1. Evidence of tracheobronchomalacia that may be clinically significant.    Moderate cardiomegaly. Dilated ascending aorta and central pulmonary  arteries. Nonspecific prominent mediastinal lymph node, suggest clinical  correlation and follow-up/further evaluation as indicated.     2. Findings suggesting fluid overload, including anasarca, small  bilateral pleural effusions, mild pericardial effusion, mild diffuse  mesenteric edema small abdominal and pelvic ascites, appearance of mild  pulmonary edema.     3. No focal acute inflammatory process of bowel is identified, limited  as described, follow-up as indications persist. Small free fluid in the  pelvis.     4. No obstructive uropathy.     Discussed by telephone with the patient's nurse, Avril, at time of  interpretation, 1528, 8/20/2017.           This report was  finalized on 8/20/2017 3:32 PM by Dr. Mg Lorenzana MD.       CT Abdomen Pelvis Without Contrast [[id number]] Collected:  08/20/17 1506     Updated:  08/20/17 1536    Narrative:       CT CHEST WO CONTRAST-, CT ABDOMEN PELVIS WO CONTRAST-     INDICATIONS: Fever. Short of breath, abdominal pain     TECHNIQUE:     Unenhanced CHEST, ABDOMEN, PELVIS CT     COMPARISON: 08/31/2010, 08/17/2000     FINDINGS:      Chest CT:     The heart size is moderately enlarged with mild pericardial effusion.  Ascending aorta is dilated, 4.0 cm, previously 3.8 cm. The central  pulmonary prominent in caliber, suggesting pulmonary arterial  hypertension. For example, the pulmonary trunk is measured at 4.6 cm,  not significantly changed. Left-sided pacemaker and cardiac leads are  seen.     Enlarged mediastinal lymph noted is seen, nonspecific, could be reactive  in nature, potentially evidence of neoplasm; right paratracheal 1.5 cm  short axis lymph node on image 32 of series 1, previously 0.5 cm.     Assessment of vascular and mediastinal structures is significantly  limited without intravenous contrast material.        The airways show decreased AP dimension of the trachea and bilateral  mainstem bronchi, left more so than right suggesting acquired  tracheobronchomalacia. For example on the left, the AP dimension of the  airway is about 1 mm on image 38 of series 2. Likewise, the AP dimension  of the airway on the lower trachea is about 1 mm on image 32 of series  2. This appearance is a change from the prior exam     Small bilateral pleural effusions are present.           The lungs show no focal pulmonary consolidation or mass. Dependent  atelectasis is present. The lungs appear mildly edematous.                 Abdomen pelvis CT:     The kidneys appear atrophied. Arterial calcifications are conspicuous at  the bilateral renal sayda.     Mild perihepatic, perisplenic ascites. Liver appears mildly enlarged.     Pancreas is thinned.      The left adrenal gland is enlarged, low-density, 3.1 CT units suggesting  presence of adenoma.     Otherwise unremarkable unenhanced appearance of the liver, spleen,  adrenal glands, pancreas, kidneys, bladder.     No bowel obstruction . Small hiatal hernia is present. Appendix does not  appear inflamed. Assessment of the colon is limited by lack of  distention, lack of enteric contrast material. Diffuse mesenteric edema  limits assessment for inflammatory changes of bowel.              No free intraperitoneal gas. Small abdominal and pelvic free fluid.     Scattered small mesenteric and para-aortic lymph nodes are seen that are  not significant by size criteria.     Abdominal aorta is not aneurysmal. Aortic and extensive other arterial  calcifications are present.     Degenerative changes are seen in the spine. No acute fracture is  identified.     A lipoma is apparent at the right abdominal wall, for example image 22  of series 1, 4 cm.     Diffuse subcutaneous edema is compatible with anasarca.             Impression:             1. Evidence of tracheobronchomalacia that may be clinically significant.    Moderate cardiomegaly. Dilated ascending aorta and central pulmonary  arteries. Nonspecific prominent mediastinal lymph node, suggest clinical  correlation and follow-up/further evaluation as indicated.     2. Findings suggesting fluid overload, including anasarca, small  bilateral pleural effusions, mild pericardial effusion, mild diffuse  mesenteric edema small abdominal and pelvic ascites, appearance of mild  pulmonary edema.     3. No focal acute inflammatory process of bowel is identified, limited  as described, follow-up as indications persist. Small free fluid in the  pelvis.     4. No obstructive uropathy.     Discussed by telephone with the patient's nurse, Avril, at time of  interpretation, 1528, 8/20/2017.           This report was finalized on 8/20/2017 3:32 PM by Dr. Mg Lorenzana MD.        XR Chest 1 View [[id number]] Collected:  08/20/17 1350     Updated:  08/20/17 1638    Narrative:       SINGLE VIEW CHEST RADIOGRAPH     HISTORY: 72-year-old female status post recent MI, found down with a  history of COPD.     FINDINGS: An upright AP portable chest radiograph was obtained.  Comparison is made to a prior examination dated 07/18/2017. There is  mild interstitial prominence seen throughout both lungs. The cardiac  silhouette is enlarged but stable. Multiple midline sternal wires are  noted. Osteopenia is also appreciated. A dual-lead left-sided cardiac  pacemaker/fibrillator is also noted.       Impression:       Stable cardiomegaly with scattered interstitial scarring  throughout both lungs. No evidence for superimposed acute process is  appreciated.     This report was finalized on 8/20/2017 4:35 PM by Dr. Freddy Forte MD.       CT Head Without Contrast [[id number]] Collected:  08/20/17 1510     Updated:  08/20/17 1716    Narrative:       CT OF THE HEAD WITHOUT CONTRAST     HISTORY: 72-year-old female status post fall with altered mental status.     TECHNIQUE: Contiguous axial images were obtained through the head  without IV contrast.     COMPARISON: CT of the head without contrast, 08/07/2017.     FINDINGS: There is no evidence for intracranial hemorrhage. There is a  moderate hypodense appearance seen within the bilateral periventricular  and subcortical white matter of the cerebral hemispheres. No territorial  edema is appreciated. There is no midline shift or abnormal extraaxial  fluid collection. The visualized paranasal sinuses and mastoid air cells  are clear. The visualized osseous structures of the skull have a normal  appearance.       Impression:       There is no evidence for an acute intracranial abnormality. Findings are  consistent with moderate chronic small vessel ischemic change over the  cerebral white matter are noted.     This report was finalized on 8/20/2017 5:13 PM by   Freddy Forte MD.             EKG:         BASELINE:       I personally viewed and interpreted the patient's EKG/Telemetry data.    Assessment:   Assessment/Plan   Active Problems:    Sepsis  Acute on chronic systolic and diastolic heart failure  Pulmonary hypertension  Acute hypoxic respiratory failure  End stage renal disease on hemodialysis    -Ventricular rate in her atrial fibrillation and has been driven up by the stressor and possible infectious etiology.  -She is currently on Levophed and diltiazem therapy.  I think the first step at this time will be to place her on a low-dose of metoprolol tartrate and try to wean off of the diltiazem therapy.  Once this is off depending on what her blood pressure and heart rate are doing could consider a one-time dose of digoxin, however I would not continue this long-term secondary to her hemodialysis.  -Antibiotics per primary  -Certainly appeared to have volume overload on her initial imaging evaluation.  Volume removal per renal with hemodialysis    Thank you for allowing me to participate in the care of Keisha Nelson. Feel free to contact me directly with any further questions or concerns.    Ricky Rogers MD  [facility]  08/21/17  8:28 AM

## 2017-08-21 NOTE — CONSULTS
[facility]                                                                                             Nephrology Initial Consult Note    Patient Identification:  Name: Keisha Nelson MRN: [id number]  Age: 72 y.o. : 1945  Sex: female  Date:2017    Requesting Physician: As per consult order.  Reason for Consultation: End-stage renal disease, fluid overload  Information from:patient/ family/ chart      History of Present Illness: This is a 72 y.o. year old female  with end-stage renal disease, chronically ill.  Recently was admitted for elective heart catheterization and suffered cardiac arrest but was resuscitated, briefly on the ventilator but was able to be extubated and subtotally improved throughout the hospitalization with dialysis and fluid removal.  She normally gets dialysis  at [facility].  She states her last dialysis on Saturday but she will received part of her treatment but does not recall the details as to why the treatment was ended early.  She denies excess fluid intake over last several days and has chronic lower extremity edema.  She had a fall yesterday at the [facility] and subsequent altered mental status but did not lose consciousness.  She came to emergency room for further evaluation.  Upon arrival she met sepsis criteria and was treated per protocol in the emergency department and we will call for emergent hemodialysis which she received yesterday evening.  According to the notes 4 kg of fluid was removed and she appears to be more stable today after dialysis.    The following medical history and medications personally reviewed by me:    Problem List:   Patient Active Problem List    Diagnosis   • Sepsis [A41.9]   • Hypocalcemia [E83.51]   • Metabolic acidosis [E87.2]   • Cardiogenic shock [R57.0]   • Acute respiratory failure  with hypoxia [J96.01]   • Pulmonary edema [J81.1]   • Chronic fatigue [R53.82]   • Automatic implantable cardioverter-defibrillator in situ [Z95.810]   • Dizziness [R42]   • Imbalance [R26.89]   • End stage renal failure on dialysis [N18.6, Z99.2]     Overview Note:     Overview:   Overview:   on dialysis since end of November 2012     • Type 2 diabetes mellitus [E11.9]   • Chronic a-fib [I48.2]   • Centrilobular emphysema [J43.2]   • Multiple-type hyperlipidemia [E78.4]   • Long term current use of anticoagulant [Z79.01]   • Benign essential hypertension [I10]   • Congestive heart failure [I50.9]   • Chronic kidney disease, stage IV (severe) [N18.4]     Overview Note:     Overview:   Followed by Dr. Reynolds  Overview:   Followed by Dr. Reynolds     • Chronic obstructive pulmonary disease [J44.9]   • Primary hypertrophic cardiomyopathy [I42.2]   • History of aortic valve replacement [Z95.2]   • Cellulitis of lower extremity [L03.119]   • Systemic infection [A41.9]   • Diabetic hypoglycemia [E11.649]   • Calciphylaxis [E83.59]     Overview Note:     Overview:   Overview:   Abdomen     • Skin ulcer [L98.499]   • Hematochezia [K92.1]   • Periumbilic abdominal tenderness [R10.815]   • Chronic obstructive bronchitis [J44.9]   • Diabetic peripheral neuropathy [E11.42]   • History of prosthetic heart valve [Z95.2]   • Nonsustained ventricular tachycardia [I47.2]   • Bleeding internal hemorrhoids [K64.8]   • Chronic diastolic heart failure [I50.32]   • Major depressive disorder with single episode [F32.9]   • Dependence on renal dialysis [Z99.2]   • Long term current use of insulin [Z79.4]   • Depression [F32.9]   • Obstructive sleep apnea syndrome [G47.33]   • Pulmonary hypertension [I27.2]   • Edema [R60.9]   • Shortness of breath [R06.02]       Past Medical History:  Past Medical History:   Diagnosis Date   • Acute diastolic CHF (congestive heart failure)    • Anxiety    • Asthma    • Atrial fibrillation     chronic    •  Chronic diastolic heart failure    • Chronic obstructive bronchitis    • COPD (chronic obstructive pulmonary disease)    • Depression    • Diabetes mellitus    • ESRD (end stage renal disease)    • Falls    • Hyperlipidemia    • Hypertension    • Long term current use of anticoagulant therapy    • Muscle weakness    • Pulmonary hypertension    • Sleep apnea    • SOB (shortness of breath)    • Tricuspid regurgitation        Past Surgical History:  Past Surgical History:   Procedure Laterality Date   • AORTIC VALVE REPAIR/REPLACEMENT     • CARDIAC CATHETERIZATION N/A 7/12/2017    Procedure: Right and Left Heart Cath;  Surgeon: Rizwan Saldaña MD;  Location: [facility] CATH INVASIVE LOCATION;  Service:    • CARDIAC CATHETERIZATION N/A 7/12/2017    Procedure: Left ventriculography;  Surgeon: Rziwan Saldaña MD;  Location: [facility] CATH INVASIVE LOCATION;  Service:    • CARDIAC CATHETERIZATION N/A 7/12/2017    Procedure: Aortic root aortogram;  Surgeon: Rizwan Saldaña MD;  Location: [facility] CATH INVASIVE LOCATION;  Service:    • CARDIAC DEFIBRILLATOR PLACEMENT Left    • DIALYSIS FISTULA CREATION Right    • LAPAROSCOPIC CHOLECYSTECTOMY     • PARTIAL HYSTERECTOMY          Home Meds:   Prescriptions Prior to Admission   Medication Sig Dispense Refill Last Dose   • acetaminophen (TYLENOL) 500 MG tablet Take 500 mg by mouth Every 6 (Six) Hours As Needed for Mild Pain (1-3).   Taking   • albuterol (PROVENTIL) (2.5 MG/3ML) 0.083% nebulizer solution Take 2.5 mg by nebulization Every 4 (Four) Hours As Needed for Wheezing.      • atorvastatin (LIPITOR) 20 MG tablet Take 20 mg by mouth Daily.   Taking   • B Complex-C-Folic Acid (NEPHRO-TC RX PO) Take 1 tablet by mouth Daily.   Taking   • benzonatate (TESSALON) 200 MG capsule Take 200 mg by mouth 3 (Three) Times a Day As Needed for Cough.      • budesonide-formoterol (SYMBICORT) 160-4.5 MCG/ACT inhaler Inhale 2 puffs 2 (Two) Times a Day.      • busPIRone (BUSPAR) 10 MG tablet Take 10 mg  by mouth 2 (Two) Times a Day.   Taking   • cyanocobalamin (VITAMIN B-12) 500 MCG tablet Take 500 mcg by mouth Daily.   Taking   • gabapentin (NEURONTIN) 100 MG capsule Take 200 mg by mouth Daily.   Taking   • guaiFENesin (MUCINEX) 600 MG 12 hr tablet Take 1,200 mg by mouth 2 (Two) Times a Day.      • loperamide (IMODIUM) 2 MG capsule Take 2 mg by mouth 4 (Four) Times a Day As Needed for Diarrhea.   Taking   • loratadine (CLARITIN) 10 MG tablet Take 10 mg by mouth Daily.   Taking   • LORazepam (ATIVAN) 0.5 MG tablet Take 0.5 mg by mouth Every 8 (Eight) Hours As Needed for Anxiety.      • Melatonin 10 MG tablet Take 2 tablets by mouth At Night As Needed.   Taking   • midodrine (PROAMATINE) 10 MG tablet Take 1 tablet by mouth 3 (Three) Times a Day Before Meals. 90 tablet 3 Taking   • sevelamer (RENVELA) 800 MG tablet Take 1 tablet by mouth 3 (Three) Times a Day With Meals. 90 tablet 3 Taking   • vitamin D (ERGOCALCIFEROL) 84464 UNITS capsule capsule Take 50,000 Units by mouth 1 (One) Time Per Week.   Taking   • warfarin (COUMADIN) 4 MG tablet Take 4 mg by mouth Daily.      • warfarin (COUMADIN) 5 MG tablet Take 5 mg by mouth See Admin Instructions. PER PT, TAKES 5MG ON SUN, TUES AND THURS   Taking   • warfarin (COUMADIN) 7.5 MG tablet Take 7.5 mg by mouth See Admin Instructions. PER PT, TAKES 7.5MG ON MON, WEDS, FRI, SAT   Taking       Current Meds:   Current Facility-Administered Medications   Medication Dose Route Frequency Provider Last Rate Last Dose   • albumin human 25 % IV SOLN 12.5 g  12.5 g Intravenous PRN Kristan Cartagena MD       • diltiaZEM (CARDIZEM) 100 mg/100 mL (1 mg/mL) 0.9% NS  5-15 mg/hr Intravenous Titrated Mahsa Walden MD   Stopped at 08/21/17 1047   • enoxaparin (LOVENOX) syringe 30 mg  30 mg Subcutaneous Daily Aj Saavedra MD   30 mg at 08/21/17 0804   • famotidine (PEPCID) injection 20 mg  20 mg Intravenous Daily Aj Saavedra MD   20 mg at 08/21/17 0804   • metoprolol tartrate  (LOPRESSOR) tablet 25 mg  25 mg Oral Q12H Ricky Rogers MD       • norepinephrine (LEVOPHED) 8 mg/250 mL (32 mcg/mL) in sodium chloride 0.9% infusion (premix)  0.02-0.3 mcg/kg/min Intravenous Titrated Steve Flores MD 19.07 mL/hr at 08/21/17 0938 0.09 mcg/kg/min at 08/21/17 0938   • ondansetron (ZOFRAN) tablet 4 mg  4 mg Oral Q6H PRN Aj Saavedra MD        Or   • ondansetron ODT (ZOFRAN-ODT) disintegrating tablet 4 mg  4 mg Oral Q6H PRN Aj PARKER. MD Keri        Or   • ondansetron (ZOFRAN) injection 4 mg  4 mg Intravenous Q6H PRN Aj PARKER. MD Keri   4 mg at 08/21/17 0759   • Pharmacy to dose vancomycin   Does not apply Continuous PRN Aj Saavedra MD       • Pharmacy to Dose Zosyn   Does not apply Continuous PRN Aj PARKER. MD Keri       • piperacillin-tazobactam (ZOSYN) 3.375 g in iso-osmotic dextrose 50 ml (premix)  3.375 g Intravenous Q12H Aj PARKER. MD Keri   3.375 g at 08/21/17 0009   • sodium chloride 0.9 % flush 1-10 mL  1-10 mL Intravenous PRN Aj Saavedra MD       • sodium chloride 0.9 % flush 10 mL  10 mL Intravenous PRN Steve Flores MD       • Vancomycin Pharmacy Intermittent Dosing   Does not apply Daily Aj Saavedra MD           Allergies:  No Known Allergies    Social History:   Social History     Social History   • Marital status:      Spouse name: N/A   • Number of children: N/A   • Years of education: N/A     Social History Main Topics   • Smoking status: Former Smoker   • Smokeless tobacco: None   • Alcohol use No   • Drug use: No   • Sexual activity: Defer     Other Topics Concern   • None     Social History Narrative        Family History:  Family History   Problem Relation Age of Onset   • Hypertension Mother    • Lung cancer Mother    • Heart attack Father    • Hypertension Father    • Breast cancer Sister    • Anxiety disorder Sister    • Alcohol abuse Brother         Review of Systems: as per HPI, in addition:    General:       + weakness / fatigue,                   "     No fevers / chills                       no weight loss  HEENT:       no dysphagia / odynophagia  Neck:           normal range of motion, no swelling  Respiratory: no cough / congestion                      + shortness of air                       No wheezing  CV:              No chest pain                       No palpitations  Abdomen/GI: no nausea / vomiting                      No diarrhea / constipation                      No abdominal pain  :             no dysuria / urinary frequency                       No urgency, normal output  Endocrine:   no polyuria / polydipsia,                      No heat or cold intolerance  Skin:           no rashes or skin breakdown   Vascular:   No edema                     No claudication  Psych:        no depression/ anxiety  Neuro:        no focal weakness, no seizures  Musculoskeletal: no joint pain or deformities      Physical Exam:  Vitals:   Temp (24hrs), Av °F (37.8 °C), Min:98.7 °F (37.1 °C), Max:101.8 °F (38.8 °C)    /49  Pulse 95  Temp 98.7 °F (37.1 °C) (Oral)   Resp 28  Ht 66\" (167.6 cm)  Wt 194 lb 14.2 oz (88.4 kg)  SpO2 96%  BMI 31.46 kg/m2  Intake/Output:     Intake/Output Summary (Last 24 hours) at 17 1110  Last data filed at 17 0620   Gross per 24 hour   Intake           1182.8 ml   Output             4000 ml   Net          -2817.2 ml        Wt Readings from Last 1 Encounters:   17 0515 194 lb 14.2 oz (88.4 kg)   17 1129 250 lb (113 kg)     Exam:    General Appearance:  Awake, alert, oriented x3, no acute distress  Chronically ill-appearing, no acute distress    Head and Face:  Normocephalic, atraumatic, mucus membranes moist, oropharynx clear   Eyes:  No icterus, pupils equal round and reactive to light, extraocular movements intact    ENMT: Moist mucosa, tongue symmetric    Neck: Supple  no jugular venous distention  no thyromegaly   Pulmonary:  Respiratory effort: Normal  Auscultation of lungs: Clear " bilaterally  No wheezes  No rhonchi  Good air movement, good expansion   Chest wall:  No tenderness or deformity   Cardiovascular:  Auscultation of the heart: Normal rhythm, no murmurs  1+ edema of extremities    Abdomen:  Abdomen: soft, non-tender, normal bowel sounds all four quadrants, no masses   Liver and spleen: no hepatosplenomegaly   Musculoskeletal: Digits and nails: normal  Normal range of motion  No joint swelling or gross deformities    Skin: Skin inspection: color normal, no visible rashes or lesions  Skin palpation: texture, turgor normal, no palpable lesions   Lymphatic:  no cervical lymphadenopathy    Psychiatric: Judgement and insight: normal  Orientation to person place and time: normal  Mood and affect: normal       DATA:  Radiology and Labs:  I have personally reviewed pertinent old records, labs, meds and pertinent data from the patient chart.    Labs:   Recent Results (from the past 24 hour(s))   POC Glucose Fingerstick    Collection Time: 08/20/17 11:40 AM   Result Value Ref Range    Glucose 130 70 - 130 mg/dL   Protime-INR    Collection Time: 08/20/17 12:20 PM   Result Value Ref Range    Protime 24.3 (H) 11.7 - 14.2 Seconds    INR 2.27 (H) 0.90 - 1.10   Comprehensive Metabolic Panel    Collection Time: 08/20/17 12:20 PM   Result Value Ref Range    Glucose 134 (H) 65 - 99 mg/dL    BUN 33 (H) 8 - 23 mg/dL    Creatinine 5.90 (H) 0.57 - 1.00 mg/dL    Sodium 129 (L) 136 - 145 mmol/L    Potassium 5.0 3.5 - 5.2 mmol/L    Chloride 88 (L) 98 - 107 mmol/L    CO2 19.5 (L) 22.0 - 29.0 mmol/L    Calcium 10.2 8.6 - 10.5 mg/dL    Total Protein 7.1 6.0 - 8.5 g/dL    Albumin 3.70 3.50 - 5.20 g/dL    ALT (SGPT) 18 1 - 33 U/L    AST (SGOT) 31 1 - 32 U/L    Alkaline Phosphatase 141 (H) 39 - 117 U/L    Total Bilirubin 2.7 (H) 0.1 - 1.2 mg/dL    eGFR  African Amer 9 (L) >60 mL/min/1.73    Globulin 3.4 gm/dL    A/G Ratio 1.1 g/dL    BUN/Creatinine Ratio 5.6 (L) 7.0 - 25.0    Anion Gap 21.5 mmol/L   aPTT     Collection Time: 08/20/17 12:20 PM   Result Value Ref Range    PTT 28.9 22.7 - 35.4 seconds   Magnesium    Collection Time: 08/20/17 12:20 PM   Result Value Ref Range    Magnesium 2.0 1.6 - 2.4 mg/dL   Phosphorus    Collection Time: 08/20/17 12:20 PM   Result Value Ref Range    Phosphorus 3.5 2.5 - 4.5 mg/dL   Calcium, Ionized    Collection Time: 08/20/17 12:20 PM   Result Value Ref Range    Ionized Calcium 1.22 1.10 - 1.35 mmol/L    Ionized Calcium 4.9 4.6 - 5.4 mg/dL   Lactic Acid, Plasma    Collection Time: 08/20/17 12:20 PM   Result Value Ref Range    Lactate 3.3 (C) 0.5 - 2.0 mmol/L   C-reactive Protein    Collection Time: 08/20/17 12:20 PM   Result Value Ref Range    C-Reactive Protein 12.16 (H) 0.00 - 0.50 mg/dL   Blood Culture    Collection Time: 08/20/17 12:20 PM   Result Value Ref Range    Blood Culture Abnormal Stain (A)     Gram Stain Result Anaerobic Bottle Gram positive cocci in clusters     Gram Stain Result Aerobic Bottle Gram positive cocci in clusters    Procalcitonin    Collection Time: 08/20/17 12:20 PM   Result Value Ref Range    Procalcitonin 3.68 (C) 0.10 - 0.25 ng/mL   Light Blue Top    Collection Time: 08/20/17 12:20 PM   Result Value Ref Range    Extra Tube hold for add-on    Green Top (Gel)    Collection Time: 08/20/17 12:20 PM   Result Value Ref Range    Extra Tube Hold for add-ons.    Lavender Top    Collection Time: 08/20/17 12:20 PM   Result Value Ref Range    Extra Tube hold for add-on    CBC Auto Differential    Collection Time: 08/20/17 12:20 PM   Result Value Ref Range    WBC 22.17 (H) 4.50 - 10.70 10*3/mm3    RBC 4.30 3.90 - 5.20 10*6/mm3    Hemoglobin 12.9 11.9 - 15.5 g/dL    Hematocrit 38.9 35.6 - 45.5 %    MCV 90.5 80.5 - 98.2 fL    MCH 30.0 26.9 - 32.0 pg    MCHC 33.2 32.4 - 36.3 g/dL    RDW 18.9 (H) 11.7 - 13.0 %    RDW-SD 61.4 (H) 37.0 - 54.0 fl    MPV 10.6 6.0 - 12.0 fL    Platelets 198 140 - 500 10*3/mm3    Neutrophil % 95.5 (H) 42.7 - 76.0 %    Lymphocyte % 0.9 (L) 19.6 -  45.3 %    Monocyte % 2.7 (L) 5.0 - 12.0 %    Eosinophil % 0.0 (L) 0.3 - 6.2 %    Basophil % 0.1 0.0 - 1.5 %    Immature Grans % 0.8 (H) 0.0 - 0.5 %    Neutrophils, Absolute 21.19 (H) 1.90 - 8.10 10*3/mm3    Lymphocytes, Absolute 0.20 (L) 0.90 - 4.80 10*3/mm3    Monocytes, Absolute 0.59 0.20 - 1.20 10*3/mm3    Eosinophils, Absolute 0.00 0.00 - 0.70 10*3/mm3    Basophils, Absolute 0.02 0.00 - 0.20 10*3/mm3    Immature Grans, Absolute 0.17 (H) 0.00 - 0.03 10*3/mm3    nRBC 0.4 (H) 0.0 - 0.0 /100 WBC   Troponin    Collection Time: 08/20/17 12:20 PM   Result Value Ref Range    Troponin T 0.121 (C) 0.000 - 0.030 ng/mL   TSH    Collection Time: 08/20/17 12:20 PM   Result Value Ref Range    TSH 3.650 0.270 - 4.200 mIU/mL   Blood Culture ID, PCR    Collection Time: 08/20/17 12:20 PM   Result Value Ref Range    BCID, PCR (A) Negative by BCID PCR. Culture to Follow.     Staphylococcus aureus, not MRSA. Identification by BCID PCR.   Blood Gas, Arterial    Collection Time: 08/20/17 12:57 PM   Result Value Ref Range    Site Arterial: left brachial     Tu's Test N/A     pH, Arterial 7.390 7.350 - 7.450 pH units    pCO2, Arterial 30.5 (L) 35.0 - 45.0 mm Hg    pO2, Arterial 87.9 80.0 - 100.0 mm Hg    HCO3, Arterial 18.4 (L) 22.0 - 28.0 mmol/L    Base Excess, Arterial -5.4 (L) 0.0 - 2.0 mmol/L    O2 Saturation Calculated 96.8 92.0 - 99.0 %    Barometric Pressure for Blood Gas 753.3 mmHg    Modality Cannula     Flow Rate 3 lpm    Set Mech Resp Rate 18    Blood Culture    Collection Time: 08/20/17  1:27 PM   Result Value Ref Range    Blood Culture No growth at less than 24 hours    POC Glucose Fingerstick    Collection Time: 08/20/17  3:10 PM   Result Value Ref Range    Glucose 109 70 - 130 mg/dL   Lactate Acid, Reflex    Collection Time: 08/20/17  6:22 PM   Result Value Ref Range    Lactate 1.4 0.5 - 2.0 mmol/L   Hepatitis B Surface Antigen    Collection Time: 08/20/17  6:22 PM   Result Value Ref Range    Hepatitis B Surface Ag  Non-Reactive Non-Reactive   POC Glucose Fingerstick    Collection Time: 08/20/17  7:47 PM   Result Value Ref Range    Glucose 96 70 - 130 mg/dL   POC Glucose Fingerstick    Collection Time: 08/21/17 12:34 AM   Result Value Ref Range    Glucose 90 70 - 130 mg/dL   Basic Metabolic Panel    Collection Time: 08/21/17  4:35 AM   Result Value Ref Range    Glucose 112 (H) 65 - 99 mg/dL    BUN 25 (H) 8 - 23 mg/dL    Creatinine 4.59 (H) 0.57 - 1.00 mg/dL    Sodium 133 (L) 136 - 145 mmol/L    Potassium 4.2 3.5 - 5.2 mmol/L    Chloride 92 (L) 98 - 107 mmol/L    CO2 18.7 (L) 22.0 - 29.0 mmol/L    Calcium 9.4 8.6 - 10.5 mg/dL    eGFR  African Amer 11 (L) >60 mL/min/1.73    BUN/Creatinine Ratio 5.4 (L) 7.0 - 25.0    Anion Gap 22.3 mmol/L   CK    Collection Time: 08/21/17  4:35 AM   Result Value Ref Range    Creatine Kinase 311 (H) 20 - 180 U/L   Lactic Acid, Plasma    Collection Time: 08/21/17  4:35 AM   Result Value Ref Range    Lactate 2.4 (C) 0.5 - 2.0 mmol/L   Protime-INR    Collection Time: 08/21/17  4:35 AM   Result Value Ref Range    Protime 27.7 (H) 11.7 - 14.2 Seconds    INR 2.68 (H) 0.90 - 1.10   Magnesium    Collection Time: 08/21/17  4:35 AM   Result Value Ref Range    Magnesium 1.9 1.6 - 2.4 mg/dL   Phosphorus    Collection Time: 08/21/17  4:35 AM   Result Value Ref Range    Phosphorus 4.1 2.5 - 4.5 mg/dL   Vancomycin, Random    Collection Time: 08/21/17  4:35 AM   Result Value Ref Range    Vancomycin Random 28.20 5.00 - 40.00 mcg/mL   POC Glucose Fingerstick    Collection Time: 08/21/17  5:17 AM   Result Value Ref Range    Glucose 84 70 - 130 mg/dL   POC Glucose Fingerstick    Collection Time: 08/21/17  7:18 AM   Result Value Ref Range    Glucose 94 70 - 130 mg/dL       Radiology:  Imaging Results (last 24 hours)     Procedure Component Value Units Date/Time    CT Chest Without Contrast [[id number]] Collected:  08/20/17 1506     Updated:  08/20/17 1536    Narrative:       CT CHEST WO CONTRAST-, CT ABDOMEN PELVIS WO  CONTRAST-     INDICATIONS: Fever. Short of breath, abdominal pain     TECHNIQUE:     Unenhanced CHEST, ABDOMEN, PELVIS CT     COMPARISON: 08/31/2010, 08/17/2000     FINDINGS:      Chest CT:     The heart size is moderately enlarged with mild pericardial effusion.  Ascending aorta is dilated, 4.0 cm, previously 3.8 cm. The central  pulmonary prominent in caliber, suggesting pulmonary arterial  hypertension. For example, the pulmonary trunk is measured at 4.6 cm,  not significantly changed. Left-sided pacemaker and cardiac leads are  seen.     Enlarged mediastinal lymph noted is seen, nonspecific, could be reactive  in nature, potentially evidence of neoplasm; right paratracheal 1.5 cm  short axis lymph node on image 32 of series 1, previously 0.5 cm.     Assessment of vascular and mediastinal structures is significantly  limited without intravenous contrast material.        The airways show decreased AP dimension of the trachea and bilateral  mainstem bronchi, left more so than right suggesting acquired  tracheobronchomalacia. For example on the left, the AP dimension of the  airway is about 1 mm on image 38 of series 2. Likewise, the AP dimension  of the airway on the lower trachea is about 1 mm on image 32 of series  2. This appearance is a change from the prior exam     Small bilateral pleural effusions are present.           The lungs show no focal pulmonary consolidation or mass. Dependent  atelectasis is present. The lungs appear mildly edematous.                 Abdomen pelvis CT:     The kidneys appear atrophied. Arterial calcifications are conspicuous at  the bilateral renal sayda.     Mild perihepatic, perisplenic ascites. Liver appears mildly enlarged.     Pancreas is thinned.     The left adrenal gland is enlarged, low-density, 3.1 CT units suggesting  presence of adenoma.     Otherwise unremarkable unenhanced appearance of the liver, spleen,  adrenal glands, pancreas, kidneys, bladder.     No bowel  obstruction . Small hiatal hernia is present. Appendix does not  appear inflamed. Assessment of the colon is limited by lack of  distention, lack of enteric contrast material. Diffuse mesenteric edema  limits assessment for inflammatory changes of bowel.              No free intraperitoneal gas. Small abdominal and pelvic free fluid.     Scattered small mesenteric and para-aortic lymph nodes are seen that are  not significant by size criteria.     Abdominal aorta is not aneurysmal. Aortic and extensive other arterial  calcifications are present.     Degenerative changes are seen in the spine. No acute fracture is  identified.     A lipoma is apparent at the right abdominal wall, for example image 22  of series 1, 4 cm.     Diffuse subcutaneous edema is compatible with anasarca.             Impression:             1. Evidence of tracheobronchomalacia that may be clinically significant.    Moderate cardiomegaly. Dilated ascending aorta and central pulmonary  arteries. Nonspecific prominent mediastinal lymph node, suggest clinical  correlation and follow-up/further evaluation as indicated.     2. Findings suggesting fluid overload, including anasarca, small  bilateral pleural effusions, mild pericardial effusion, mild diffuse  mesenteric edema small abdominal and pelvic ascites, appearance of mild  pulmonary edema.     3. No focal acute inflammatory process of bowel is identified, limited  as described, follow-up as indications persist. Small free fluid in the  pelvis.     4. No obstructive uropathy.     Discussed by telephone with the patient's nurse, Avril, at time of  interpretation, 1528, 8/20/2017.           This report was finalized on 8/20/2017 3:32 PM by Dr. Mg Lorenzana MD.       CT Abdomen Pelvis Without Contrast [[id number]] Collected:  08/20/17 1506     Updated:  08/20/17 1536    Narrative:       CT CHEST WO CONTRAST-, CT ABDOMEN PELVIS WO CONTRAST-     INDICATIONS: Fever. Short of breath, abdominal  pain     TECHNIQUE:     Unenhanced CHEST, ABDOMEN, PELVIS CT     COMPARISON: 08/31/2010, 08/17/2000     FINDINGS:      Chest CT:     The heart size is moderately enlarged with mild pericardial effusion.  Ascending aorta is dilated, 4.0 cm, previously 3.8 cm. The central  pulmonary prominent in caliber, suggesting pulmonary arterial  hypertension. For example, the pulmonary trunk is measured at 4.6 cm,  not significantly changed. Left-sided pacemaker and cardiac leads are  seen.     Enlarged mediastinal lymph noted is seen, nonspecific, could be reactive  in nature, potentially evidence of neoplasm; right paratracheal 1.5 cm  short axis lymph node on image 32 of series 1, previously 0.5 cm.     Assessment of vascular and mediastinal structures is significantly  limited without intravenous contrast material.        The airways show decreased AP dimension of the trachea and bilateral  mainstem bronchi, left more so than right suggesting acquired  tracheobronchomalacia. For example on the left, the AP dimension of the  airway is about 1 mm on image 38 of series 2. Likewise, the AP dimension  of the airway on the lower trachea is about 1 mm on image 32 of series  2. This appearance is a change from the prior exam     Small bilateral pleural effusions are present.           The lungs show no focal pulmonary consolidation or mass. Dependent  atelectasis is present. The lungs appear mildly edematous.                 Abdomen pelvis CT:     The kidneys appear atrophied. Arterial calcifications are conspicuous at  the bilateral renal sayda.     Mild perihepatic, perisplenic ascites. Liver appears mildly enlarged.     Pancreas is thinned.     The left adrenal gland is enlarged, low-density, 3.1 CT units suggesting  presence of adenoma.     Otherwise unremarkable unenhanced appearance of the liver, spleen,  adrenal glands, pancreas, kidneys, bladder.     No bowel obstruction . Small hiatal hernia is present. Appendix does  not  appear inflamed. Assessment of the colon is limited by lack of  distention, lack of enteric contrast material. Diffuse mesenteric edema  limits assessment for inflammatory changes of bowel.              No free intraperitoneal gas. Small abdominal and pelvic free fluid.     Scattered small mesenteric and para-aortic lymph nodes are seen that are  not significant by size criteria.     Abdominal aorta is not aneurysmal. Aortic and extensive other arterial  calcifications are present.     Degenerative changes are seen in the spine. No acute fracture is  identified.     A lipoma is apparent at the right abdominal wall, for example image 22  of series 1, 4 cm.     Diffuse subcutaneous edema is compatible with anasarca.             Impression:             1. Evidence of tracheobronchomalacia that may be clinically significant.    Moderate cardiomegaly. Dilated ascending aorta and central pulmonary  arteries. Nonspecific prominent mediastinal lymph node, suggest clinical  correlation and follow-up/further evaluation as indicated.     2. Findings suggesting fluid overload, including anasarca, small  bilateral pleural effusions, mild pericardial effusion, mild diffuse  mesenteric edema small abdominal and pelvic ascites, appearance of mild  pulmonary edema.     3. No focal acute inflammatory process of bowel is identified, limited  as described, follow-up as indications persist. Small free fluid in the  pelvis.     4. No obstructive uropathy.     Discussed by telephone with the patient's nurse, Avril, at time of  interpretation, 1528, 8/20/2017.           This report was finalized on 8/20/2017 3:32 PM by Dr. Mg Lorenzana MD.       XR Chest 1 View [[id number]] Collected:  08/20/17 1350     Updated:  08/20/17 1638    Narrative:       SINGLE VIEW CHEST RADIOGRAPH     HISTORY: 72-year-old female status post recent MI, found down with a  history of COPD.     FINDINGS: An upright AP portable chest radiograph was  obtained.  Comparison is made to a prior examination dated 07/18/2017. There is  mild interstitial prominence seen throughout both lungs. The cardiac  silhouette is enlarged but stable. Multiple midline sternal wires are  noted. Osteopenia is also appreciated. A dual-lead left-sided cardiac  pacemaker/fibrillator is also noted.       Impression:       Stable cardiomegaly with scattered interstitial scarring  throughout both lungs. No evidence for superimposed acute process is  appreciated.     This report was finalized on 8/20/2017 4:35 PM by Dr. Freddy Forte MD.       CT Head Without Contrast [[id number]] Collected:  08/20/17 1510     Updated:  08/20/17 1716    Narrative:       CT OF THE HEAD WITHOUT CONTRAST     HISTORY: 72-year-old female status post fall with altered mental status.     TECHNIQUE: Contiguous axial images were obtained through the head  without IV contrast.     COMPARISON: CT of the head without contrast, 08/07/2017.     FINDINGS: There is no evidence for intracranial hemorrhage. There is a  moderate hypodense appearance seen within the bilateral periventricular  and subcortical white matter of the cerebral hemispheres. No territorial  edema is appreciated. There is no midline shift or abnormal extraaxial  fluid collection. The visualized paranasal sinuses and mastoid air cells  are clear. The visualized osseous structures of the skull have a normal  appearance.       Impression:       There is no evidence for an acute intracranial abnormality. Findings are  consistent with moderate chronic small vessel ischemic change over the  cerebral white matter are noted.     This report was finalized on 8/20/2017 5:13 PM by Dr. Freddy Forte MD.                  Assessment:   Problem List:   End-stage renal disease, received emergent hemodialysis last night, was seen Tuesday Thursday Saturday schedule  Sepsis syndrome, improved, on low-dose pressors  Hypotension on Levophed  Pulmonary edema, status  post dialysis with UF  Acute on chronic diastolic heart failure  Severe pulmonary hypertension  Acute hypoxemic respiratory failure  Hyponatremia secondary to fluid overload  Diabetes mellitus type 2          Plan:   She received emergent hemodialysis last night  No need for additional hemodialysis today  Will plan additional dialysis tomorrow to resume Tuesday Thursday Saturday schedule  Ultrafiltration with dialysis as tolerated  Wean pressors as able        Continue to monitor electrolytes and volume closely     Thank you very much for the referral.    I appreciate the opportunity to participate in this patient's care.  Please call with any questions or concerns.         Toni Oconnell M.D  [facility]  [phone]  8/21/2017        Dictation via Dragon dictation software

## 2017-08-21 NOTE — DISCHARGE PLACEMENT REQUEST
"Keisha Nelson (72 y.o. Female)     Date of Birth Social Security Number Address Home Phone MRN    1945  [address] [facility] [phone] [id number]    Yarsani Marital Status          Evangelical        Admission Date Admission Type Admitting Provider Attending Provider Department, Room/Bed    8/20/17 Emergency Roberto Rizvi MD Sayied, Tausif, MD [facility] INTENSIVE CARE, 389/1    Discharge Date Discharge Disposition Discharge Destination                      Attending Provider: Roberto Rizvi MD     Allergies:  No Known Allergies    Isolation:  None   Infection:  None   Code Status:  FULL    Ht:  66\" (167.6 cm)   Wt:  194 lb 14.2 oz (88.4 kg)    Admission Cmt:  None   Principal Problem:  None                Active Insurance as of 8/20/2017     Primary Coverage     Payor Plan Insurance Group Employer/Plan Group    MEDICARE MEDICARE A & B      Payor Plan Address Payor Plan Phone Number Effective From Effective To    [address] [phone] 6/1/2010     [place] [address]       Subscriber Name Subscriber Birth Date Member ID       KEISHA NELSON 1945 [id number]           Secondary Coverage     Payor Plan Insurance Group Employer/Plan Group    ANTH BLUE CROSS [facility] SUPP [id number]     Payor Plan Address Payor Plan Phone Number Effective From Effective To    [address]  2/10/2017     [place] [address]       Subscriber Name Subscriber Birth Date Member ID       KEISHA NELSON 1945 [id number]                 Emergency Contacts      (Rel.) Home Phone Work Phone Mobile Phone    Becka Nelson (Daughter) -- -- [phone]    Naveen Nelson (Son) [phone] -- --    Nuha Nelson (Daughter) [phone] -- [phone]              "

## 2017-08-21 NOTE — PROGRESS NOTES
Pharmacy consult to dose Zosyn    Keisha Nelson is a 72 y.o. female 250 lb (113 kg).  Pharmacy consulted to dose for sepsis per Dr Saavedra's request.  Other abx: vancomycin IV    HPI:  Sepsis  Pulmonary edema  CHF  ESRD on hemodialysis  Hyponatremia  Type II diabetes    IV Anti-Infectives     Ordered     Dose/Rate Route Frequency Start Stop    08/20/17 1654  piperacillin-tazobactam (ZOSYN) 3.375 g in iso-osmotic dextrose 50 ml (premix)     Ordering Provider:  Aj Saavedra MD    3.375 g  over 4 Hours Intravenous Every 12 Hours 08/21/17 0100      08/20/17 1611  Pharmacy to dose vancomycin     Ordering Provider:  Aj Saavedra MD     Does not apply Continuous PRN 08/20/17 1611      08/20/17 1201  piperacillin-tazobactam (ZOSYN) 4.5 g in iso-osmotic dextrose 100 mL IVPB (premix)     Ordering Provider:  Steve Flores MD    4.5 g  over 0.5 Hours Intravenous Once 08/20/17 1203 08/20/17 1418    08/20/17 1201  vancomycin 2250 mg/500 mL 0.9% NS IVPB ([facility])     Ordering Provider:  Steve Flores MD    20 mg/kg × 113 kg Intravenous Once 08/20/17 1203 08/20/17 1316           Creatinine   Date Value Ref Range Status   08/20/2017 5.90 (H) 0.57 - 1.00 mg/dL Final     WBC   Date Value Ref Range Status   08/20/2017 22.17 (H) 4.50 - 10.70 10*3/mm3 Final     Temp Readings from Last 2 Encounters:   08/20/17 98.9 °F (37.2 °C) (Oral)   08/07/17 97.8 °F (36.6 °C) (Oral)       Baseline cultures:  8/20 BC x2= in progress  8/20 CXR= in progress    PLAN:  Will adjust Zosyn to 3.375 gm IV Q 12 hr infused over 4 hours.  Will monitor and adjust as needed.  Thank you Dr Saavedra for the consult    Edilma Schwarz, PharmD  08/20/17 8:07 PM

## 2017-08-21 NOTE — THERAPY EVALUATION
Acute Care - Speech Language Pathology   Swallow Initial Evaluation [facility]     Patient Name: Keisha Nelson  : 1945  MRN: [id number]  Today's Date: 2017               Admit Date: 2017    SPEECH-LANGUAGE PATHOLOGY EVALUATION - SWALLOW  Subjective: The patient was seen on this date for a Clinical Swallow evaluation.  Patient was alert and cooperative, though fatigued.    The patient's history is significant for sepsis of unclear source, fluid overload, pulmonary edema, ESRD w/HD. She has a hx of tracheobronchomalacia. Last bedside swallow evaluation was 2017, and appeared WFL.   Objective: Textures given included thin liquid, nectar thick liquid, puree consistency and regular consistency.  Assessment: Pt demonstrated slight raspy/wet voicing at baseline intermittently, which persisted at times after sips of thin liquids, making aspiration difficult to differentiate. She took very little PO beyond sips of water, but had a few sips of NTL and bites of pureed and regular solid without overt s/s of aspiration. Laryngeal elevation felt adequate and timely on palpation, but concern is for fatigue/poor endurance with meals. D/w RN, who will monitor pt with PO and ensure adequate alertness. She did not want to agree to NTL, but was willing to try it if allowed water between meals.  SLP Findings:  Patient presents with mild oropharyngeal dysphagia, without esophageal component.   Recommendations: Diet Textures: nectar thick liquid, mechanical soft consistency with no mixed textures food.  Medications should be taken whole or crushed with puree. May have water between meals after oral care, under staff or family supervision and with the recommended strategies for safe swallowing.   Recommended Strategies: Upright for PO, small bites and sips and no straw. Oral care before breakfast, after all meals and PRN.  Other Recommended Evaluations: VFSS    Dysphagia therapy is recommended. Rationale: if  indicated per VFSS.    Visit Dx:     ICD-10-CM ICD-9-CM   1. Sepsis, due to unspecified organism A41.9 038.9     995.91   2. Metabolic encephalopathy G93.41 348.31   3. End stage renal disease N18.6 585.6     Patient Active Problem List   Diagnosis   • Benign essential hypertension   • Congestive heart failure   • Chronic obstructive pulmonary disease   • Primary hypertrophic cardiomyopathy   • History of aortic valve replacement   • Imbalance   • End stage renal failure on dialysis   • Type 2 diabetes mellitus   • Chronic a-fib   • Dizziness   • Bleeding internal hemorrhoids   • Hematochezia   • Systemic infection   • Calciphylaxis   • Cellulitis of lower extremity   • Centrilobular emphysema   • Chronic diastolic heart failure   • Chronic obstructive bronchitis   • Multiple-type hyperlipidemia   • Dependence on renal dialysis   • Depression   • Diabetic hypoglycemia   • Diabetic peripheral neuropathy   • Edema   • History of prosthetic heart valve   • Long term current use of anticoagulant   • Long term current use of insulin   • Major depressive disorder with single episode   • Nonsustained ventricular tachycardia   • Obstructive sleep apnea syndrome   • Periumbilic abdominal tenderness   • Pulmonary hypertension   • Automatic implantable cardioverter-defibrillator in situ   • Skin ulcer   • Shortness of breath   • Chronic fatigue   • Cardiogenic shock   • Acute respiratory failure with hypoxia   • Pulmonary edema   • Hypocalcemia   • Metabolic acidosis   • Sepsis     Past Medical History:   Diagnosis Date   • Acute diastolic CHF (congestive heart failure)    • Anxiety    • Asthma    • Atrial fibrillation     chronic    • Chronic diastolic heart failure    • Chronic obstructive bronchitis    • COPD (chronic obstructive pulmonary disease)    • Depression    • Diabetes mellitus    • ESRD (end stage renal disease)    • Falls    • Hyperlipidemia    • Hypertension    • Long term current use of anticoagulant therapy     • Muscle weakness    • Pulmonary hypertension    • Sleep apnea    • SOB (shortness of breath)    • Tricuspid regurgitation      Past Surgical History:   Procedure Laterality Date   • AORTIC VALVE REPAIR/REPLACEMENT     • CARDIAC CATHETERIZATION N/A 7/12/2017    Procedure: Right and Left Heart Cath;  Surgeon: Rizwan Saldaña MD;  [facility];  Service:    • CARDIAC CATHETERIZATION N/A 7/12/2017    Procedure: Left ventriculography;  Surgeon: Rizwan Saldaña MD;  [facility];  Service:    • CARDIAC CATHETERIZATION N/A 7/12/2017    Procedure: Aortic root aortogram;  Surgeon: Rizwan Saldaña MD;  Location: [facility] CATH INVASIVE LOCATION;  Service:    • CARDIAC DEFIBRILLATOR PLACEMENT Left    • DIALYSIS FISTULA CREATION Right    • LAPAROSCOPIC CHOLECYSTECTOMY     • PARTIAL HYSTERECTOMY            SWALLOW EVALUATION (last 72 hours)      Swallow Evaluation       08/21/17 [facility]                Rehab Evaluation    Document Type evaluation  -CP        Subjective Information no complaints  -CP        General Information    Patient Profile Review yes  -CP        Current Diet Limitations NPO  -CP        Prior Level of Function- Swallowing no diet consistency restrictions  -CP        Plans/Goals Discussed With patient  -CP        Barriers to Rehab medically complex  -CP        Clinical Impression    Patient's Goals For Discharge return to regular diet  -CP        SLP Swallowing Diagnosis pharyngeal dysfunction  -CP        Rehab Potential/Prognosis, Swallowing good, to achieve stated therapy goals  -CP        Criteria for Skilled Therapeutic Interventions Met skilled criteria for dysphagia intervention met  -CP        FCM, Swallowing 4-->Level 4  -CP        Therapy Frequency PRN  -CP        Predicted Duration Therapy Interv (days) until discharge  -CP        Expected Duration Therapy Session (min) 15-30 minutes  -CP        SLP Diet Recommendation soft  textures;chopped;nectar/syrup-thick liquids  -CP        Recommended Diagnostics VFSS (MBS)  -CP        Recommended Feeding/Eating Techniques small sips/bites;other (see comments)   PO only when ALERT  -CP        SLP Rec. for Method of Medication Administration meds whole in pudding/applesauce  -CP        Monitor For Signs Of Aspiration cough;gurgly voice;throat clearing  -CP        Anticipated Discharge Disposition other (see comments)   unknown  -CP        Cognitive Assessment/Intervention    Current Cognitive/Communication Assessment impaired   some mild confusion  -CP        Oral Motor Structure and Function    Oral Motor Anatomy and Physiology patient demonstrates anatomy that is WNL  -CP        Dentition Assessment present and adequate  -CP        Mucosal Quality moist, healthy  -CP        Oral Musculature General Assessment WNL (within normal limits)  -CP          User Key  (r) = Recorded By, (t) = Taken By, (c) = Cosigned By    Initials Name Effective Dates    CP Monica Wilkinson, MS CCC-SLP 04/13/15 -         EDUCATION  The patient has been educated in the following areas:   Dysphagia (Swallowing Impairment) Oral Care/Hydration Modified Diet Instruction.    SLP Recommendation and Plan  SLP Swallowing Diagnosis: pharyngeal dysfunction  SLP Diet Recommendation: soft textures, chopped, nectar/syrup-thick liquids  Recommended Feeding/Eating Techniques: small sips/bites, other (see comments) (PO only when ALERT)  SLP Rec. for Method of Medication Administration: meds whole in pudding/applesauce  Monitor For Signs Of Aspiration: cough, gurgly voice, throat clearing  Recommended Diagnostics: VFSS (MBS)  Criteria for Skilled Therapeutic Interventions Met: skilled criteria for dysphagia intervention met  Anticipated Discharge Disposition: other (see comments) (unknown)  Rehab Potential/Prognosis, Swallowing: good, to achieve stated therapy goals  Therapy Frequency: PRN             Plan of Care Review  Plan Of Care  Reviewed With: patient  Outcome Summary/Follow up Plan: Bedside swallow completed. See report for details. Recommend modified diet given endurance/risk of fatigue.          IP SLP Goals       08/21/17 1357          Safely Consume Diet    Safely Consume Diet- SLP, Date Established 08/21/17  -CP      Safely Consume Diet- SLP, Time to Achieve by discharge  -CP        User Key  (r) = Recorded By, (t) = Taken By, (c) = Cosigned By    Initials Name Provider Type    ADAM Wilkinson MS CCC-SLP Speech and Language Pathologist             SLP Outcome Measures (last 72 hours)      SLP Outcome Measures       08/21/17 1300          SLP Outcome Measures    Outcome Measure Used? Adult NOMS  -CP      FCM Scores    FCM Chosen Swallowing  -CP      Swallowing FCM Score 4  -CP        User Key  (r) = Recorded By, (t) = Taken By, (c) = Cosigned By    Initials Name Effective Dates    ADAM Wilkinson MS CCC-ISIDRO 04/13/15 -            Time Calculation:         Time Calculation- SLP       08/21/17 1359          Time Calculation- SLP    SLP Start Time 1200  -CP      SLP Stop Time 1300  -CP      SLP Time Calculation (min) 60 min  -CP      SLP Received On 08/21/17  -CP        User Key  (r) = Recorded By, (t) = Taken By, (c) = Cosigned By    Initials Name Provider Type    ADAM Wilkinson MS CCC-SLP Speech and Language Pathologist          Therapy Charges for Today     Code Description Service Date Service Provider Modifiers Qty    [id number]  ST EVAL ORAL PHARYNG SWALLOW 4 8/21/2017 Monica Wilkinson MS CCC-SLP GN 1               MS RICK Alvarez  8/21/2017

## 2017-08-21 NOTE — PLAN OF CARE
Problem: Patient Care Overview (Adult)  Goal: Plan of Care Review    08/21/17 1357   Coping/Psychosocial Response Interventions   Plan Of Care Reviewed With patient   Outcome Evaluation   Outcome Summary/Follow up Plan Bedside swallow completed. See report for details. Recommend modified diet given endurance/risk of fatigue.         Problem: Inpatient SLP  Goal: Dysphagia- Patient will safely consume diet as per recommendation with no signs/symptoms of aspiration    08/21/17 1357   Safely Consume Diet   Safely Consume Diet- SLP, Date Established 08/21/17   Safely Consume Diet- SLP, Time to Achieve by discharge

## 2017-08-21 NOTE — NURSING NOTE
Pt seen for right 5th metatarsal diabetic foot ulcer;  Dried callous with very small opening in center. No drainage, no s/s of infection.  Dialysis patient.   Will have staff apply betadine drsg daily

## 2017-08-21 NOTE — PROGRESS NOTES
Dr. MAIRA Saavedra    [facility] INTENSIVE CARE    8/21/2017    Patient ID:  Name:  Keisha Nelson  MRN:  [id number]  1945  72 y.o.  female            CC/Reason for visit: Follow-up for sepsis, altered mental status and many other chronic problems as well as acute problems LISTED below.    HPI: The patient's mental status is improving.  She complains of extreme thirst and left knee pain.  She fell and she hit her left knee    Vitals:  Vitals:    08/21/17 0650 08/21/17 0700 08/21/17 0705 08/21/17 0802   BP: 96/70  120/41    BP Location:       Patient Position:       Pulse: 92  96 98   Resp:       Temp:  98.7 °F (37.1 °C)     TempSrc:  Oral     SpO2: 95%  98% 97%   Weight:       Height:               Body mass index is 31.46 kg/(m^2).    Intake/Output Summary (Last 24 hours) at 08/21/17 0849  Last data filed at 08/21/17 0620   Gross per 24 hour   Intake           1182.8 ml   Output             4000 ml   Net          -2817.2 ml       Exam:  GEN:  No distress, appears stated age  Neurologic exam: Cranial nerves III through XII are grossly intact.  She is not fully oriented, only knows her name but not oriented to date, time or situation.  She does recognize her daughter at the bedside.  EYES:   EOM-i, anicteric sclera bilat  ENT:    External ears/nose normal, OP clear  NECK:  Supple, midline trachea  LUNGS: A few scattered crackles, but no wheezing.  There is diminished breath sounds over the bases, nonlabored breathing  CV:  Normal S1S2, without murmur, no edema  ABD:  Non tender, no enlarged liver or masses  EXT:  No cyanosis or clubbing.  Her left knee is mildly swollen.  I cannot palpate any crepitus or fluctuation.  It is tender when I try to bend her left knee.  Her left knee has limited range of motion.  Her right knee is nontender with normal range of motion    Scheduled meds:    enoxaparin 30 mg Subcutaneous Daily   famotidine 20 mg Intravenous Daily   metoprolol tartrate 25 mg Oral Q12H    piperacillin-tazobactam 3.375 g Intravenous Q12H   Vancomycin Pharmacy Intermittent Dosing  Does not apply Daily     IV meds:                        diltiaZEM 5-15 mg/hr Last Rate: 10 mg/hr (08/21/17 0037)   norepinephrine 0.02-0.3 mcg/kg/min Last Rate: 0.15 mcg/kg/min (08/21/17 0340)   Pharmacy to dose vancomycin     Pharmacy to Dose Zosyn         Data Review:   I reviewed the patient's medications and new clinical results.  Lab Results   Component Value Date    CALCIUM 9.4 08/21/2017    PHOS 4.1 08/21/2017    MG 1.9 08/21/2017    MG 2.0 08/20/2017    MG 2.5 (H) 07/18/2017       Results from last 7 days  Lab Units 08/21/17  0435 08/20/17  1220   SODIUM mmol/L 133* 129*   POTASSIUM mmol/L 4.2 5.0   CHLORIDE mmol/L 92* 88*   CO2 mmol/L 18.7* 19.5*   BUN mg/dL 25* 33*   CREATININE mg/dL 4.59* 5.90*   CALCIUM mg/dL 9.4 10.2   BILIRUBIN mg/dL  --  2.7*   ALK PHOS U/L  --  141*   ALT (SGPT) U/L  --  18   AST (SGOT) U/L  --  31   GLUCOSE mg/dL 112* 134*   WBC 10*3/mm3  --  22.17*   HEMOGLOBIN g/dL  --  12.9   PLATELETS 10*3/mm3  --  198   INR  2.68* 2.27*   PROCALCITONIN ng/mL  --  3.68*         Results from last 7 days  Lab Units 08/21/17  0435 08/20/17  1220   CK TOTAL U/L 311*  --    TROPONIN T ng/mL  --  0.121*       Results from last 7 days  Lab Units 08/20/17  1257   PH, ARTERIAL pH units 7.390   PCO2, ARTERIAL mm Hg 30.5*   PO2 ART mm Hg 87.9   FLOW RATE lpm 3   MODALITY  Cannula   O2 SATURATION CALC % 96.8           ASSESSMENT:   Sepsis  Fluid overload  Pulmonary edema  Chronic diastolic heart failure with acute exacerbation  Severe left ventricular hypertrophy  Severe Secondary pulmonary hypertension  Acute hypoxic respiratory failure  End-stage renal disease on hemodialysis  Hyponatremia  Tracheobronchomalacia  Diabetes type 2  Fall with left knee trauma    PLAN:  This patient came in with sepsis yesterday.  She has multiple chronic medical conditions, as well as several new acute life-threatening problems.  I  reviewed lab results, test results and visualized her images directly.  Medical decision making is complex in this patient.    She receives hemodialysis last night due to volume overload and pulmonary edema.  Her respiratory status has improved.  She is breathing better.  Her encephalopathy is beginning to improve.  She still confused but at least conversant today.  Source of sepsis is unclear.  She is on broad-spectrum antibiotics.  We will continue this until we receive results of blood cultures.  We will try to advance her mobility and her diet today.  She will need a bedside swallow evaluation before feeding.  We will obtain a left knee x-ray because of knee swelling and pain.        Aj Saavedra MD  8/21/2017

## 2017-08-21 NOTE — PLAN OF CARE
Problem: Patient Care Overview (Adult)  Goal: Plan of Care Review  Outcome: Ongoing (interventions implemented as appropriate)    08/21/17 0527   Coping/Psychosocial Response Interventions   Plan Of Care Reviewed With son   Patient Care Overview   Progress declining   Outcome Evaluation   Outcome Summary/Follow up Plan Pt remains in ICU, on Levophed/Cardizem gtts. Tolerated HD well, 4 liters taken off. BP remains labile. Pt still drowsy. Will continue to monitor.         Problem: Fall Risk (Adult)  Goal: Absence of Falls  Outcome: Ongoing (interventions implemented as appropriate)    Problem: Skin Integrity Impairment, Risk/Actual (Adult)  Goal: Skin Integrity/Wound Healing  Outcome: Ongoing (interventions implemented as appropriate)    Problem: Fluid Volume Excess (Adult,Obstetrics,Pediatric)  Goal: Balanced Intake/Output  Outcome: Ongoing (interventions implemented as appropriate)

## 2017-08-22 PROBLEM — I95.9 HYPOTENSION: Status: ACTIVE | Noted: 2017-01-01

## 2017-08-22 NOTE — PROGRESS NOTES
"Patient Care Team:  Yamilet Zurita MD as PCP - General (Family Medicine)    Chief Complaint: Atrial fibrillation with rapid ventricular rate    Interval History:   Altered.  More hypotensive.    Objective   Vital Signs  Temp:  [97.5 °F (36.4 °C)-98.3 °F (36.8 °C)] 98.3 °F (36.8 °C)  Heart Rate:  [] 81  BP: ()/() 121/110    Intake/Output Summary (Last 24 hours) at 08/22/17 1050  Last data filed at 08/22/17 0858   Gross per 24 hour   Intake           1132.1 ml   Output                0 ml   Net           1132.1 ml     Flowsheet Rows         First Filed Value    Admission Height  66\" (167.6 cm) Documented at 08/20/2017 1129    Admission Weight  250 lb (113 kg) Documented at 08/20/2017 1129          General Appearance:    Alert, cooperative. Ill appearing   Head:    Normocephalic, without obvious abnormality, atraumatic       Neck:   No adenopathy, supple, no thyromegaly, no carotid bruit, no    JVD   Lungs:     Clear to auscultation bilaterally, no wheezes, rales, or     rhonchi    Heart:    Normal rate, regular rhythm,  No murmur, rub, or gallop   Chest Wall:    No abnormalities observed   Abdomen:     Normal bowel sounds, soft, non-tender, non-distended,            no rebound tenderness   Extremities:   No cyanosis, clubbing, or edema   Pulses:   Pulses palpable and equal bilaterally   Skin:   No bleeding or rash       Neurologic:  Moves all extremities and he is likely          famotidine 20 mg Intravenous Daily   metoprolol tartrate 25 mg Oral Q12H   phytonadione (VITAMIN K) IVPB 5 mg Intravenous Once   Vancomycin Pharmacy Intermittent Dosing  Does not apply Daily         norepinephrine 0.02-0.3 mcg/kg/min Last Rate: 0.1 mcg/kg/min (08/22/17 5220)   Pharmacy to dose vancomycin     Pharmacy to Dose Zosyn         Results Review:      Results from last 7 days  Lab Units 08/22/17  0421   SODIUM mmol/L 133*   POTASSIUM mmol/L 4.6   CHLORIDE mmol/L 92*   CO2 mmol/L 15.4*   BUN mg/dL 41*   CREATININE " mg/dL 5.51*   GLUCOSE mg/dL 160*   CALCIUM mg/dL 9.4       Results from last 7 days  Lab Units 08/21/17  0435 08/20/17  1220   CK TOTAL U/L 311*  --    TROPONIN T ng/mL  --  0.121*       Results from last 7 days  Lab Units 08/20/17  1220   WBC 10*3/mm3 22.17*   HEMOGLOBIN g/dL 12.9   HEMATOCRIT % 38.9   PLATELETS 10*3/mm3 198       Results from last 7 days  Lab Units 08/22/17  0852 08/21/17  0435 08/20/17  1220   INR  3.54* 2.68* 2.27*   APTT seconds  --   --  28.9           Results from last 7 days  Lab Units 08/21/17  0435   MAGNESIUM mg/dL 1.9         @LABNT(bnp)@  I reviewed the patient's new clinical results.  I personally viewed and interpreted the patient's EKG/Telemetry data       Assessment/Plan   Sepsis: MSSA  Acute on chronic systolic and diastolic heart failure  Pulmonary hypertension  Acute hypoxic respiratory failure  End stage renal disease on hemodialysis  Atrial fibrillation, chronic     -Ventricular rate improved. Now of Diltiazem  -She is currently on Levophed   -We will hold metoprolol tartrate at this time due to hypotension  -Antibiotics per primary  -Certainly appeared to have volume overload on her initial imaging evaluation.  Volume removal per renal with hemodialysis

## 2017-08-22 NOTE — CONSULTS
Purpose of the visit was to evaluate for: goals of care/advanced care planning. Spoke with RN as well as family and discussed palliative care, goals of care, care options and resuscitation status.      Assessment:  Patient is palliative care appropriate for inpatient care given sepsis, end stage renal disease, unstable BP, hypotension, pulmonary edema, metabolic encephalopathy, heart failure. She was in rehab recently post arrest, AICD, on dialysis 3 days a week. Held today due to low BP. Discussed Code Status regarding CPR and ventilator. They are unable to make decision about changing code status. One of her sons, who says he is the POA, but not documentation to support this, said she is a fighter and she will want to be given a chance to survive even if it is CPR and ventilator. Other children believe she would not want to have CPR again.     Recommendations/Plan: Large family of 6 children and siblings unable to make decision to change Code Status. They want to talk to other family that is not present at this time.     Other Comments: Thank you for the consult. We will continue to follow if needed.

## 2017-08-22 NOTE — NURSING NOTE
After reviewing bp recycled as the pt is known to pull and pick lines/tubes, attempted manual BP, was unsuccessful in auscultating BP. Dopplered all pulses. On assessment of left radial pulse, small hematoma noticed. Pressure applied to the site and bp cuff moved to left lower leg. Findings reported to Dr. Saavedra along with INR, see MD orders. Read back and verified. Jessica Cabrera RN for Dr. Rogers updated on the patients condition, no new orders received. Will inform Dr. Oconnell.    Dorian Soto RN

## 2017-08-22 NOTE — PROGRESS NOTES
Dr. MAIRA Saavedra    [facility] CORONARY CARE    8/22/2017    Patient ID:  Name:  Keisha Nelson  MRN:  [id number]  1945  72 y.o.  female            CC/Reason for visit: Follow-up for sepsis, altered mental status and many other chronic problems as well as acute problems LISTED below.       HPI: The patient's condition is worsening.  She remains in critical care unit.  She is still on Levophed intravenously.  She has exhibited increased confusion/hallucinations overnight, becoming very restless.  She has received some when necessary Ativan.  She is now coughing when she is fed any ice chips, choking and aspirating.  She is developing dysphagia.    Vitals:  Vitals:    08/22/17 0650 08/22/17 0705 08/22/17 0720 08/22/17 0800   BP: 101/67 110/76 102/88 91/72   BP Location:       Patient Position:       Pulse: 77 79 76 74   Resp:       Temp:       TempSrc:       SpO2: 95% (!) 89% 91% 97%   Weight:       Height:               Body mass index is 31.49 kg/(m^2).    Intake/Output Summary (Last 24 hours) at 08/22/17 0831  Last data filed at 08/22/17 0559   Gross per 24 hour   Intake           1072.1 ml   Output                0 ml   Net           1072.1 ml       Exam:  GEN:    Appears acutely ill  Neurologic exam: She is awake, but confused.  Cranial nerves III through XII appear grossly intact.  She does move all 4 cavities on command but she is not very verbal.  She is not communicating much.  EYES:   EOM-i, anicteric sclera bilat  ENT:    External ears/nose normal, OP clear  NECK:  Supple, midline trachea  LUNGS: Some mild crackles and wheezes bilaterally, shallow breathing, rapid breathing.  CV:  Normal S1S2, without murmur, trace edema in both ankles  ABD:  Overweight, soft.  No liver or spleen can be palpated  EXT:  No cyanosis or clubbing.  Her left knee is swollen, tender.  There is limited range of motion and there is pain area I cannot tell if there is any erythema or crepitus.  There is some  fluctuation.      Scheduled meds:    enoxaparin 30 mg Subcutaneous Daily   famotidine 20 mg Intravenous Daily   metoprolol tartrate 25 mg Oral Q12H   piperacillin-tazobactam 3.375 g Intravenous Q12H   Vancomycin Pharmacy Intermittent Dosing  Does not apply Daily     IV meds:                        diltiaZEM 5-15 mg/hr Last Rate: Stopped (08/21/17 1047)   norepinephrine 0.02-0.3 mcg/kg/min Last Rate: 0.1 mcg/kg/min (08/22/17 0451)   Pharmacy to dose vancomycin     Pharmacy to Dose Zosyn         Data Review:   I reviewed the patient's medications and new clinical results.  Lab Results   Component Value Date    CALCIUM 9.4 08/22/2017    PHOS 4.1 08/21/2017    MG 1.9 08/21/2017    MG 2.0 08/20/2017    MG 2.5 (H) 07/18/2017       Results from last 7 days  Lab Units 08/22/17  0421 08/21/17  0435 08/20/17  1220   SODIUM mmol/L 133* 133* 129*   POTASSIUM mmol/L 4.6 4.2 5.0   CHLORIDE mmol/L 92* 92* 88*   CO2 mmol/L 15.4* 18.7* 19.5*   BUN mg/dL 41* 25* 33*   CREATININE mg/dL 5.51* 4.59* 5.90*   CALCIUM mg/dL 9.4 9.4 10.2   BILIRUBIN mg/dL  --   --  2.7*   ALK PHOS U/L  --   --  141*   ALT (SGPT) U/L  --   --  18   AST (SGOT) U/L  --   --  31   GLUCOSE mg/dL 160* 112* 134*   WBC 10*3/mm3  --   --  22.17*   HEMOGLOBIN g/dL  --   --  12.9   PLATELETS 10*3/mm3  --   --  198   INR   --  2.68* 2.27*   PROCALCITONIN ng/mL  --   --  3.68*         Results from last 7 days  Lab Units 08/20/17  1327 08/20/17  1220   BLOODCX  No growth at 24 hours Abnormal Stain*   GRAM STAIN RESULT   --  Anaerobic Bottle Gram positive cocci in clusters  Aerobic Bottle Gram positive cocci in clusters   BCIDPCR   --  Staphylococcus aureus, not MRSA. Identification by BCID PCR.*         Results from last 7 days  Lab Units 08/21/17  0435 08/20/17  1220   CK TOTAL U/L 311*  --    TROPONIN T ng/mL  --  0.121*       Results from last 7 days  Lab Units 08/20/17  1257   PH, ARTERIAL pH units 7.390   PCO2, ARTERIAL mm Hg 30.5*   PO2 ART mm Hg 87.9   FLOW RATE  lpm 3   MODALITY  Cannula   O2 SATURATION CALC % 96.8         ASSESSMENT:   Probable MSSA staph bacteremia  Sepsis  Fluid overload  Pulmonary edema  Chronic diastolic heart failure with acute exacerbation  Severe left ventricular hypertrophy  Severe Secondary pulmonary hypertension  Acute hypoxic respiratory failure  End-stage renal disease on hemodialysis  Hyponatremia  Tracheobronchomalacia  Diabetes type 2  Fall with left knee trauma      PLAN:  This patient remains critically ill.  Her condition is actually somewhat worse today than yesterday.  She had temporary improvement yesterday.  She has become more confused at night, more restless and agitated.  Perhaps some benzodiazepine withdrawal.  We have started when necessary IV Ativan at low dose.    She is still requiring Levophed as IV pressor.  She is also on some beta blocker.  I will discuss with cardiology if we could stop beta blockers temporarily.  Her heart rate is now much more acceptable, between 70 and 80 beats per minute, after we stopped Cardizem drip.    Her sepsis seems to be due to gram-positive bacteremia, possibly Staphylococcus, not MRSA.  Continue vancomycin IV per pharmacy dosing.    I reviewed lab results, test results, and personally visualized her most recent imaging.  Left knee x-ray yesterday shows left knee fusion.  I will order orthopedic consult, and possible CT of the knee.  Patient cannot have MRI due to cardiac pacer.  She may need arthrocentesis, especially in the setting of bacteremia.  I'm not sure if this is an infected knee.  Certainly on physical examination, her knee is tender and swollen, but physical exam is nonspecific for ruling out septic arthritis.  Unfortunately, at this time due to elevated INR and having received Lovenox, the patient may not be able to undergo joint aspiration today.  We will see what radiology and orthopedics say about this.    Continue hemodialysis under direction of nephrology.    Critical care  time 35 minutes, excluding any separately billable procedure time            Aj Saavedra MD  8/22/2017

## 2017-08-22 NOTE — PLAN OF CARE
Problem: Patient Care Overview (Adult)  Goal: Plan of Care Review  Outcome: Ongoing (interventions implemented as appropriate)    08/22/17 1942   Coping/Psychosocial Response Interventions   Plan Of Care Reviewed With patient;family   Patient Care Overview   Progress no change   Outcome Evaluation   Outcome Summary/Follow up Plan pt still in CCU, family discussed further care wishes and palliative care was consulted, still no decision made about care by family, BP flucuated frequentely and Levophed was increased accordingly, still very restless with some confusion, hemo dialysis canceled for today, will continue to monitor         Problem: Fall Risk (Adult)  Goal: Absence of Falls  Outcome: Ongoing (interventions implemented as appropriate)    08/22/17 1942   Fall Risk (Adult)   Absence of Falls making progress toward outcome         Problem: Skin Integrity Impairment, Risk/Actual (Adult)  Goal: Skin Integrity/Wound Healing  Outcome: Ongoing (interventions implemented as appropriate)    08/22/17 1942   Skin Integrity Impairment, Risk/Actual (Adult)   Skin Integrity/Wound Healing making progress toward outcome         Problem: Fluid Volume Excess (Adult,Obstetrics,Pediatric)  Goal: Stable Weight  Outcome: Ongoing (interventions implemented as appropriate)    08/22/17 1942   Fluid Volume Excess (Adult,Obstetrics,Pediatric)   Stable Weight making progress toward outcome       Goal: Balanced Intake/Output  Outcome: Ongoing (interventions implemented as appropriate)    08/22/17 1942   Fluid Volume Excess (Adult,Obstetrics,Pediatric)   Balanced Intake/Output making progress toward outcome

## 2017-08-22 NOTE — NURSING NOTE
Informed Dr. Saavedra regarding the family's discussion with the palliative care RN. Family has made no decision but wishes to hold on A-line placement until all the patients children are present and can discuss the plan for the patient. Family and MD aware of BP and current medication status.    Dorian Soto RN

## 2017-08-22 NOTE — PROGRESS NOTES
"Pharmacokinetic Consult - Vancomycin Dosing (Follow-up Note)    Keisha Nelson is on day 3 pharmacy to dose vancomycin for sepsis.  Pharmacy dosing vancomycin per Dr. Saavedra's request.   Goal random level: 15-20  ESRD on HD [facility]     Relevant clinical data and objective history reviewed:  72 y.o. female 66\" (167.6 cm) 195 lb 1.7 oz (88.5 kg)    Vital Signs (last 24 hours)       08/21 0700  -  08/22 0659 08/22 0700  -  08/22 0927   Most Recent    Temp (°F) 97.5 -  98.7      98.3     98.3 (36.8)    Heart Rate 68 -  104    74 -  79     74    BP (!)76/45 -  149/64    91/72 -  110/76     91/72    SpO2 (%) (!)75 -  100    (!)89 -  97     97          Creatinine   Date Value Ref Range Status   08/22/2017 5.51 (H) 0.57 - 1.00 mg/dL Final   08/21/2017 4.59 (H) 0.57 - 1.00 mg/dL Final   08/20/2017 5.90 (H) 0.57 - 1.00 mg/dL Final     BUN   Date Value Ref Range Status   08/22/2017 41 (H) 8 - 23 mg/dL Final     Lab Results   Component Value Date    WBC 22.17 (H) 08/20/2017       Baseline culture/source/susceptibility:  8/20: Blood form Arm, Left- NGTD  8/20: Blood from Arm, Left- GPC (Staph Aureus - NOT MRSA per BCID)  8/21: Lactate, Venous- 2.4    IV Anti-Infectives     Ordered     Dose/Rate Route Frequency Start Stop    08/20/17 2018  Vancomycin Pharmacy Intermittent Dosing     Ordering Provider:  Aj Saavedra MD     Does not apply Daily 08/21/17 0900      08/20/17 1611  Pharmacy to dose vancomycin     Ordering Provider:  Aj Saavedra MD     Does not apply Continuous PRN 08/20/17 1611      08/20/17 1201  piperacillin-tazobactam (ZOSYN) 4.5 g in iso-osmotic dextrose 100 mL IVPB (premix)     Ordering Provider:  Steve Flores MD    4.5 g  over 0.5 Hours Intravenous Once 08/20/17 1203 08/20/17 1418    08/20/17 1201  vancomycin 2250 mg/500 mL 0.9% NS IVPB ([facility])     Ordering Provider:  Steve Flores MD    20 mg/kg × 113 kg Intravenous Once 08/20/17 1203 08/20/17 1316        Lab Results   Component Value Date    VancoRandom " 20.80 08/22/2017     Vancomycin Dosing History:  8/20 1316: Vancomcyin 2.25 gm iv x 1 dose                 8/21 0435 random level 28.2 mcg/ml   8/22 0421 random level 20.8 mcg/ml    Assessment/Plan  Reviewed chart.  Renal function: HD TThSa (4 hour dialysis today)    1. Will dose Vancomycin 750 mg IV x 1 dose after HD today  2. Vancomycin random level on Thursday prior to HD 8/24  3. Creatinine in AM    Pharmacy will continue to follow daily while on vancomycin and adjust as needed.     Katina Hyman, PharmD, BCPS

## 2017-08-22 NOTE — NURSING NOTE
Spoke with Dr. Saavedra regarding inability to obtain a reliable BP despite changing BP sites multiple times. MD wants to hold vitamin K until vascular comes to see the patient and have them tell us how to titrate BP with the Levophed. Read back and verified. Also informed MD that the procedures for today were cancelled and the earliest the vascular lab can compete the ordered studies is around 1130. Read back and verified.    Dorian Soto RN

## 2017-08-22 NOTE — PLAN OF CARE
Problem: Patient Care Overview (Adult)  Goal: Plan of Care Review  Outcome: Ongoing (interventions implemented as appropriate)    08/22/17 0633   Coping/Psychosocial Response Interventions   Plan Of Care Reviewed With patient   Patient Care Overview   Progress no change   Outcome Evaluation   Outcome Summary/Follow up Plan Pt remains in CCU, on low dose Levophed. Pt with increased confusion/hallucinations over night, very restless. PRN Ativan given. Pt appeared to start coughing when eating ice chips. BP remains labile. Will continue to monitor.         Problem: Fall Risk (Adult)  Goal: Absence of Falls  Outcome: Ongoing (interventions implemented as appropriate)    Problem: Skin Integrity Impairment, Risk/Actual (Adult)  Goal: Skin Integrity/Wound Healing  Outcome: Ongoing (interventions implemented as appropriate)

## 2017-08-23 NOTE — PLAN OF CARE
Problem: Patient Care Overview (Adult)  Goal: Plan of Care Review  Outcome: Ongoing (interventions implemented as appropriate)    08/23/17 0541   Coping/Psychosocial Response Interventions   Plan Of Care Reviewed With patient;family   Patient Care Overview   Progress declining   Outcome Evaluation   Outcome Summary/Follow up Plan Pt remains in CCU, on high dose Levophed. BP remains labile. Pt much more lethargic/nonrepsonsive. Family updated on pt condition, waiting for family to decide future goals of care. Will continue to monitor.         Problem: Fall Risk (Adult)  Goal: Absence of Falls  Outcome: Ongoing (interventions implemented as appropriate)    Problem: Skin Integrity Impairment, Risk/Actual (Adult)  Goal: Skin Integrity/Wound Healing  Outcome: Ongoing (interventions implemented as appropriate)    Problem: Fluid Volume Excess (Adult,Obstetrics,Pediatric)  Goal: Stable Weight  Outcome: Ongoing (interventions implemented as appropriate)  Goal: Balanced Intake/Output  Outcome: Ongoing (interventions implemented as appropriate)

## 2017-08-23 NOTE — PROGRESS NOTES
"Patient Care Team:  Yamilet Zurita MD as PCP - General (Family Medicine)    Chief Complaint: Follow-up atrial fibrillation with RVR    Interval History:   More hypotensive.  Moving in the wrong direction.  Ventricular rate reasonably controlled.    Objective   Vital Signs  Temp:  [97.4 °F (36.3 °C)-98.3 °F (36.8 °C)] 97.4 °F (36.3 °C)  Heart Rate:  [] 105  BP: ()/() 90/57    Intake/Output Summary (Last 24 hours) at 08/23/17 0930  Last data filed at 08/23/17 0534   Gross per 24 hour   Intake          1521.62 ml   Output                0 ml   Net          1521.62 ml     Flowsheet Rows         First Filed Value    Admission Height  66\" (167.6 cm) Documented at 08/20/2017 1129    Admission Weight  250 lb (113 kg) Documented at 08/20/2017 1129          General Appearance:    Not arousable    Head:    Normocephalic, without obvious abnormality, atraumatic       Neck:   No adenopathy, supple, no thyromegaly, no carotid bruit, no    JVD   Lungs:     Clear to auscultation bilaterally, no wheezes, rales, or     rhonchi    Heart:    Normal rate, irregular rhythm,  No murmur, rub, or gallop   Chest Wall:    No abnormalities observed   Abdomen:     Normal bowel sounds, soft, non-tender, non-distended,            no rebound tenderness   Extremities:   No cyanosis, clubbing, or edema   Pulses:   Pulses palpable and equal bilaterally   Skin:   No bleeding or rash       Neurologic:   Cranial nerves 2 - 12 grossly intact, sensation intact             famotidine 20 mg Intravenous Daily   metoprolol tartrate 25 mg Oral Q12H   Vancomycin Pharmacy Intermittent Dosing  Does not apply Daily         norepinephrine 0.02-0.3 mcg/kg/min Last Rate: 0.27 mcg/kg/min (08/23/17 0730)   Pharmacy to dose vancomycin         Results Review:      Results from last 7 days  Lab Units 08/23/17 0527 08/22/17  0421   SODIUM mmol/L  --  133*   POTASSIUM mmol/L  --  4.6   CHLORIDE mmol/L  --  92*   CO2 mmol/L  --  15.4*   BUN mg/dL  --  " 41*   CREATININE mg/dL 6.09* 5.51*   GLUCOSE mg/dL  --  160*   CALCIUM mg/dL  --  9.4       Results from last 7 days  Lab Units 08/21/17  0435 08/20/17  1220   CK TOTAL U/L 311*  --    TROPONIN T ng/mL  --  0.121*       Results from last 7 days  Lab Units 08/20/17  1220   WBC 10*3/mm3 22.17*   HEMOGLOBIN g/dL 12.9   HEMATOCRIT % 38.9   PLATELETS 10*3/mm3 198       Results from last 7 days  Lab Units 08/22/17  0852 08/21/17  0435 08/20/17  1220   INR  3.54* 2.68* 2.27*   APTT seconds  --   --  28.9           Results from last 7 days  Lab Units 08/21/17  0435   MAGNESIUM mg/dL 1.9           I reviewed the patient's new clinical results.  I personally viewed and interpreted the patient's EKG/Telemetry data        Assessment/Plan   Acute on chronic systolic and diastolic heart failure  Pulmonary hypertension  Acute hypoxic respiratory failure  End stage renal disease on hemodialysis  Atrial fibrillation, chronic      -Ventricular rate improved of meds.   -She is currently on Levophed   -Antibiotics per primary    Patient is not to make it out of his hospitalization.  She appears to be dying.   Nothing to add.   Will sign off.

## 2017-08-23 NOTE — PROGRESS NOTES
Dr. MAIRA Saavedra    [facility] CORONARY CARE    8/23/2017    Patient ID:  Name:  Keisha Nelson  MRN:  [id number]  1945  72 y.o.  female            CC/Reason for visit: Follow-up for sepsis, altered mental status and many other chronic problems as well as acute problems LISTED below.       HPI: The patient is less arousable.  She became agitated and restless last night.  She only received a little bit of Ativan.  Her blood pressure has been extremely labile.  It has been difficult to accurately assess her blood pressure due to her large size of her arms, as well as her peripheral vasculopathy and the inability to take the blood pressure on one arm where she has AV fistula.    Vitals:  Vitals:    08/23/17 0745 08/23/17 0800 08/23/17 0807 08/23/17 0815   BP:  106/86 (!) 115/30    BP Location:       Patient Position:       Pulse: 96 101  99   Resp:       Temp:       TempSrc:       SpO2: (!) 85% (!) 87%  90%   Weight:       Height:               Body mass index is 31.7 kg/(m^2).    Intake/Output Summary (Last 24 hours) at 08/23/17 0828  Last data filed at 08/23/17 0534   Gross per 24 hour   Intake          1581.62 ml   Output                0 ml   Net          1581.62 ml       Exam:  GEN:  Unarousable.  Obtunded.  Does not follow any commands  EYES:    anicteric sclera bilat  NECK:  Supple, midline trachea  LUNGS: Clear breath sounds bilat, nonlabored breathing  CV:  Normal S1S2, without murmur, no edema  ABD:  Non tender, no enlarged liver or masses  EXT:  No cyanosis or clubbing      Scheduled meds:    famotidine 20 mg Intravenous Daily   metoprolol tartrate 25 mg Oral Q12H   Vancomycin Pharmacy Intermittent Dosing  Does not apply Daily     IV meds:                        norepinephrine 0.02-0.3 mcg/kg/min Last Rate: 0.27 mcg/kg/min (08/23/17 0730)   Pharmacy to dose vancomycin         Data Review:   I reviewed the patient's medications and new clinical results.  Lab Results   Component Value Date     CALCIUM 9.4 08/22/2017    PHOS 4.1 08/21/2017    MG 1.9 08/21/2017    MG 2.0 08/20/2017    MG 2.5 (H) 07/18/2017       Results from last 7 days  Lab Units 08/23/17  0527 08/22/17  0852 08/22/17  0421 08/21/17  0435 08/20/17  1220   SODIUM mmol/L  --   --  133* 133* 129*   POTASSIUM mmol/L  --   --  4.6 4.2 5.0   CHLORIDE mmol/L  --   --  92* 92* 88*   CO2 mmol/L  --   --  15.4* 18.7* 19.5*   BUN mg/dL  --   --  41* 25* 33*   CREATININE mg/dL 6.09*  --  5.51* 4.59* 5.90*   CALCIUM mg/dL  --   --  9.4 9.4 10.2   BILIRUBIN mg/dL  --   --   --   --  2.7*   ALK PHOS U/L  --   --   --   --  141*   ALT (SGPT) U/L  --   --   --   --  18   AST (SGOT) U/L  --   --   --   --  31   GLUCOSE mg/dL  --   --  160* 112* 134*   WBC 10*3/mm3  --   --   --   --  22.17*   HEMOGLOBIN g/dL  --   --   --   --  12.9   PLATELETS 10*3/mm3  --   --   --   --  198   INR   --  3.54*  --  2.68* 2.27*   PROCALCITONIN ng/mL  --   --   --   --  3.68*         Results from last 7 days  Lab Units 08/20/17  1327 08/20/17  1220   BLOODCX  No growth at 2 days Staphylococcus aureus*   GRAM STAIN RESULT   --  Anaerobic Bottle Gram positive cocci in clusters  Aerobic Bottle Gram positive cocci in clusters   BCIDPCR   --  Staphylococcus aureus, not MRSA. Identification by BCID PCR.*         Results from last 7 days  Lab Units 08/21/17  0435 08/20/17  1220   CK TOTAL U/L 311*  --    TROPONIN T ng/mL  --  0.121*       Results from last 7 days  Lab Units 08/20/17  1257   PH, ARTERIAL pH units 7.390   PCO2, ARTERIAL mm Hg 30.5*   PO2 ART mm Hg 87.9   FLOW RATE lpm 3   MODALITY  Cannula   O2 SATURATION CALC % 96.8           ASSESSMENT:   Acute metabolic encephalopathy, worsening  Probable MSSA staph bacteremia  Sepsis  Fluid overload  Pulmonary edema  Chronic diastolic heart failure with acute exacerbation  Severe left ventricular hypertrophy  Severe Secondary pulmonary hypertension  Acute hypoxic respiratory failure  End-stage renal disease on  hemodialysis  Hyponatremia  Tracheobronchomalacia  Diabetes type 2  Fall with left knee trauma      PLAN:  Unfortunately this patient's mental status continues to be her main problem.  She is not arousable at this time.  Last night she became quite confused, delirious and restless.  She is receiving proper treatment for sepsis.  So far it seems as if she has Staphylococcus bacteremia.  She is properly being treated with vancomycin.    We had a conversation with the family yesterday.  I spoke to one of her daughters and informed her that after I discussed the case with nephrologist, Dr. Muñoz Daily, we felt that the patient was very ill, and with poor prognosis, possibly not surviving this hospital stay.  The family was not very receptive to this opinion.  They would like to continue full code and all intensive care.    Today we will proceed with dialysis since metabolic waste products are accumulating due to her end-stage renal disease.  I discussed this with Dr. Muñoz Daily, nephrology.    She has multiple chronic medical problems, and several new acute life-threatening medical problems.  I reviewed her test results, lab results as well as directly visualize her images.  She has a high medical decision making complexity.  She is critically ill.  She is still receiving Levophed as vasopressor.    Total critical care time 35 minutes, excluding any separately billable procedure time        Aj Saavedra MD  8/23/2017

## 2017-08-23 NOTE — PROGRESS NOTES
"   LOS: 3 days   Patient Care Team:  Yamilet Zurita MD as PCP - General (Family Medicine)    Chief Complaint/ Reason for encounter: End-stage renal disease, dialysis management  Chief Complaint   Patient presents with   • Altered Mental Status   • Fever         Subjective     Altered Mental Status   Associated symptoms include a fever and weakness. Pertinent negatives include no chest pain or nausea.   Fever    Pertinent negatives include no chest pain or nausea.       Subjective:  Symptoms:  Worsening.  She reports malaise and weakness.  No shortness of breath or chest pain.  (Much worse today  More hypotensive, increasing pressor requirements  Less alert  Hemodynamically unstable today  Actively dying  Family unfortunately still wants full code/ supportive measures despite her grim prognosis).    Diet:  NPO.  No nausea.    Activity level: Impaired due to weakness.    Pain:  She reports no pain.          History taken from: Patient and chart    Objective     Vital Signs  Temp:  [97.4 °F (36.3 °C)-98.3 °F (36.8 °C)] 97.4 °F (36.3 °C)  Heart Rate:  [] 105  BP: ()/() 90/57       Wt Readings from Last 1 Encounters:   08/23/17 0520 196 lb 6.9 oz (89.1 kg)   08/22/17 0425 195 lb 1.7 oz (88.5 kg)   08/21/17 0515 194 lb 14.2 oz (88.4 kg)   08/20/17 1129 250 lb (113 kg)       Objective:  General Appearance:  Comfortable, in no acute distress, not in pain and ill-appearing (Intubated and sedated on the ventilator).    Vital signs: (most recent): Blood pressure 90/57, pulse 105, temperature 97.4 °F (36.3 °C), temperature source Oral, resp. rate 28, height 66\" (167.6 cm), weight 196 lb 6.9 oz (89.1 kg), SpO2 97 %.  Vital signs are normal.  Fever.  (Hemodynamically unstable, on pressors, labile   BP).    Output: No urine output.    HEENT: (ETT in place)    Lungs:  Tachypnea and increased effort.  She is in respiratory distress.  Breath sounds clear to auscultation.  There are rales, rhonchi and decreased " breath sounds.  No wheezes.    Heart: Tachycardia.  Regular rhythm.  S1 normal and S2 normal.  Positive for murmur.    Abdomen: Abdomen is soft and non-distended.  Bowel sounds are normal.   There is no abdominal tenderness.  There is no epigastric area or no suprapubic area tenderness.  There is no rebound tenderness.     Extremities: Normal range of motion.  There is dependent edema.  There is no deformity.    Pulses: Distal pulses are intact.    Neurological: (Confused, encephalopathic).    Pupils:  Pupils are equal, round, and reactive to light.    Skin:  Warm and dry.  No rash or cyanosis.             Results Review:    Past Medical History: reviewed and updated  Past Medical History:   Diagnosis Date   • Acute diastolic CHF (congestive heart failure)    • Anxiety    • Asthma    • Atrial fibrillation     chronic    • Chronic diastolic heart failure    • Chronic obstructive bronchitis    • COPD (chronic obstructive pulmonary disease)    • Depression    • Diabetes mellitus    • ESRD (end stage renal disease)    • Falls    • Hyperlipidemia    • Hypertension    • Long term current use of anticoagulant therapy    • Muscle weakness    • Pulmonary hypertension    • Sleep apnea    • SOB (shortness of breath)    • Tricuspid regurgitation          Allergies:  No Known Allergies    Intake/Output:     Intake/Output Summary (Last 24 hours) at 08/23/17 0953  Last data filed at 08/23/17 0534   Gross per 24 hour   Intake          1521.62 ml   Output                0 ml   Net          1521.62 ml         DATA:  Interval chart, labs and notes reviewed.  The following labs meds and chart reviewed  Discussed with Dr. Saavedra and RN re: goals of care  Much worse today  Total time spent 35 minutes, critical care time    Labs:   Recent Results (from the past 24 hour(s))   POC Glucose Fingerstick    Collection Time: 08/22/17 12:28 PM   Result Value Ref Range    Glucose 117 70 - 130 mg/dL   Duplex Venous Upper Extremity - Bilateral CAR     Collection Time: 08/22/17  1:35 PM   Result Value Ref Range    Right Internal Jugular Augment N     Right Internal Jugular Compress N     Right Internal Jugular Phasic N     Right Internal Jugular Spont N     Right Internal Jugular Thrombus A     Right Internal Jugular Color 1     Right Subclavian Augment Y     Right Subclavian Competent Y     Right Subclavian Compress C     Right Subclavian Phasic Y     Right Subclavian Spont Y     Right Axillary Augment Y     Right Axillary Competent Y     Right Axillary Compress C     Right Axillary Phasic Y     Right Axillary Spont Y     Right Brachial Compress C     Right Radial Compress C     Right Ulnar Compress C     Right Basilic Upper Compress C     Right Basilic Forearm Compress C     Right Cephalic Upper Compress C     Right Cephalic Forearm Compress C     Left Internal Jugular Augment Y     Left Internal Jugular Competent Y     Left Internal Jugular Compress C     Left Internal Jugular Phasic Y     Left Internal Jugular Spont Y     Left Subclavian Augment Y     Left Subclavian Competent Y     Left Subclavian Phasic Y     Left Subclavian Spont Y     Left Axillary Augment Y     Left Axillary Competent Y     Left Axillary Compress C     Left Axillary Phasic Y     Left Axillary Spont Y     Left Brachial Compress C     Left Radial Compress C     Left Ulnar Compress C     Left Basilic Upper Compress C     Left Basilic Forearm Compress C     Left Cephalic Upper Compress C     Left Cephalic Forearm Compress C    POC Glucose Fingerstick    Collection Time: 08/22/17  6:44 PM   Result Value Ref Range    Glucose 115 70 - 130 mg/dL   POC Glucose Fingerstick    Collection Time: 08/23/17 12:46 AM   Result Value Ref Range    Glucose 143 (H) 70 - 130 mg/dL   Creatinine, Serum    Collection Time: 08/23/17  5:27 AM   Result Value Ref Range    Creatinine 6.09 (H) 0.57 - 1.00 mg/dL    eGFR  African Amer 8 (L) >60 mL/min/1.73   POC Glucose Fingerstick    Collection Time: 08/23/17  5:36 AM    Result Value Ref Range    Glucose 135 (H) 70 - 130 mg/dL       Radiology:  Imaging Results (last 24 hours)     ** No results found for the last 24 hours. **             Medications have been reviewed:  Current Facility-Administered Medications   Medication Dose Route Frequency Provider Last Rate Last Dose   • famotidine (PEPCID) injection 20 mg  20 mg Intravenous Daily Aj H. MD Keri   20 mg at 08/23/17 0903   • LORazepam (ATIVAN) injection 0.25 mg  0.25 mg Intravenous BID PRN Aj H. MD Keri   0.25 mg at 08/23/17 0819   • melatonin tablet 5 mg  5 mg Oral Nightly PRN Aj H. MD Keri   5 mg at 08/21/17 2215   • metoprolol tartrate (LOPRESSOR) tablet 25 mg  25 mg Oral Q12H Ricky Rogers MD   Stopped at 08/22/17 2049   • norepinephrine (LEVOPHED) 8 mg/250 mL (32 mcg/mL) in sodium chloride 0.9% infusion (premix)  0.02-0.3 mcg/kg/min Intravenous Titrated Steve Flores MD 57.2 mL/hr at 08/23/17 0730 0.27 mcg/kg/min at 08/23/17 0730   • ondansetron (ZOFRAN) tablet 4 mg  4 mg Oral Q6H PRN Aj H. MD Keri        Or   • ondansetron ODT (ZOFRAN-ODT) disintegrating tablet 4 mg  4 mg Oral Q6H PRN Aj H. MD Keri        Or   • ondansetron (ZOFRAN) injection 4 mg  4 mg Intravenous Q6H PRN Ja H. MD Keri   4 mg at 08/21/17 0759   • Pharmacy to dose vancomycin   Does not apply Continuous PRN Aj H. MD Keri       • sodium chloride 0.9 % flush 1-10 mL  1-10 mL Intravenous PRN Aj Saavedra MD       • sodium chloride 0.9 % flush 10 mL  10 mL Intravenous PRN Steve Flores MD       • Vancomycin Pharmacy Intermittent Dosing   Does not apply Daily Aj KEITH. MD Keri           Assessment/Plan     Active Problems:    Sepsis    Hypotension      Assessment:  (End-stage renal disease, remains too unstable for dialysis, she would likely code once HD were to be initiated  Sepsis syndrome,  worse today, more hypotensive  Hypotension on Levophed, worse  Pulmonary edema, status post dialysis but cannot tolerate  further UF with current BP  Acute on chronic diastolic heart failure  Severe pulmonary hypertension  Acute hypoxemic respiratory failure, worsening  Hyponatremia secondary to fluid overload  Diabetes mellitus type 2  Toxic metabolic encephalopathy           Plan:   She continues to decline today  Hemodynamically too unstable even for CRRT  Family still wants full code  D/w Dr. Saavedra  Very poor prognosis  Appears to be actively dying and DNR warranted).             Continue to monitor renal function, electroytes and volume closely   Please call me with any questions or concerns      Toni Oconnell MD   [facility]   [phone]    08/23/17  9:53 AM      Dictation performed using Dragon dictation software             No

## 2017-08-23 NOTE — SIGNIFICANT NOTE
08/23/17 0701   Rehab Treatment   Discipline speech language pathologist   Rehab Evaluation   Evaluation Not Performed unable to evaluate, medical status change;other (see comments)  (Per RN pt not alert for VFSS this date. ST to f/u at bedside prior to completion of VFSS. )

## 2017-08-23 NOTE — NURSING NOTE
Extensive conversation with RN, MD, and family and various end of life education topics discussed with family today.  Family has decided on an AND code status for patient, maintaining pressor for time being, while other siblings of patient travel into town hoping to see her prior to her death.  Patient has required minimal pain and sedation needs throughout shift, only occasionally displaying CPOT+ findings or light agitation (arms moved as to swat away something twice, ativan given this morning). Family educated to contact RN immediately should she appear even slightly uncomfortable. Pt CPOT of 0 on each round expect as noted.  Will continue to monitor.

## 2017-08-24 NOTE — CONSULTS
Met with family this afternoon (sons [place], Sujit, daughter Becka, another daughter, sister Simona, and others) to review current status and plan for comfort care only/no further treatment and transfer to the Palliative Care Unit as explained by Dr Saavedra to family this morning- they readily verbalized understanding and agreement. [facility] services explained with focus on HSB GIP; they all agreed. Consents reviewed and signed by [place]. Care Guide briefly reviewed. Staying Connected Books given for parents of younger children.   Admitted as HSB GIP.  Thank you for this referral.  Ava Hopkins,RN  [phone]

## 2017-08-24 NOTE — DISCHARGE SUMMARY
Patient Identification:  Name: Keisha Nelson  Age: 72 y.o.  Sex: female  :  1945  MRN: [id number]  Primary Care Physician: Yamilet Zurita MD    Admit date: 2017  Discharge date and time: 2017   Discharged Condition: critical    Discharge Diagnoses:  Acute metabolic encephalopathy   MSSA staph bacteremia  Sepsis  Fluid overload  Pulmonary edema  Chronic diastolic heart failure with acute exacerbation  Severe left ventricular hypertrophy  Severe Secondary pulmonary hypertension  Acute hypoxic respiratory failure  End-stage renal disease on hemodialysis  Hyponatremia  Tracheobronchomalacia  Diabetes type 2  Fall with left knee trauma      Hospital Course: Keisha Nelson presented to [facility] after having had a fall approximately 8 in the morning at the nursing home.  A fall apparently happened because of altered mental status.  The patient never lost consciousness.  She does have a history of frequent falls.  She does have peripheral neuropathy and diabetic feet.  The patient is on Coumadin.  Yesterday she had partial hemodialysis.  It is unclear why she could not complete a full session of dialysis.  Upon arrival here in the emergency room she is showing sepsis criteria, with atrial fibrillation, fever, hypotension and tachypnea.    She was admitted to the intensive care unit.  She received intravenous antibiotics, low-dose Ativan because of chronic use of Ativan, and general supportive care in the intensive care unit.  Unfortunately, the patient has progressively become more encephalopathic and also more hypotensive.  Her shock continued to worsen in spite of increase in vasopressor dose.    I had a conversation with Dr. Rogers from cardiology, as well as Dr. Toni jack from nephrology.  The patient's condition had deteriorated significantly, and our consensus opinion was that she would possibly not survive this hospital stay.  We conveyed this information to the  family, and the family decided not to proceed with any artificial life-sustaining measures, and to stop all other medical treatment and simply provide comfort measures with IV narcotics and benzodiazepines and allow natural death.    I am consulting palliative service under Dr. Eder Lamar, and discharging the patient today from the CCU to the palliative floor in [facility].  She is expected to slowly deteriorate and passed away with comfort measures on the palliative floor.    Consults:   IP CONSULT TO PULMONOLOGY  IP CONSULT TO NEPHROLOGY  IP CONSULT TO CARDIOLOGY  IP CONSULT TO CASE MANAGEMENT   IP CONSULT TO PALLIATIVE CARE NURSE  IP CONSULT TO ORTHOPEDIC SURGERY  IP CONSULT TO PALLIATIVE CARE NURSE    Significant Diagnostic Studies:   CBC:   Results from last 7 days  Lab Units 08/24/17  0343   WBC 10*3/mm3 19.17*   RBC 10*6/mm3 4.09     BMP:   Results from last 7 days  Lab Units 08/24/17  0343   GLUCOSE mg/dL 155*   CO2 mmol/L 15.3*   BUN mg/dL 58*   CREATININE mg/dL 6.90*   CALCIUM mg/dL 9.1     Coagulation:   Lab Results   Component Value Date    INR 3.54 (H) 08/22/2017     Cardiac markers:   Results from last 7 days  Lab Units 08/20/17  1220   TROPONIN T ng/mL 0.121*     ABGs:   Results from last 7 days  Lab Units 08/20/17  1257   BASE EXCESS ART mmol/L -5.4*     Radiology review:   Imaging Results (most recent)     Procedure Component Value Units Date/Time    CT Chest Without Contrast [[id number]] Collected:  08/20/17 1506     Updated:  08/20/17 1536    Narrative:       CT CHEST WO CONTRAST-, CT ABDOMEN PELVIS WO CONTRAST-     INDICATIONS: Fever. Short of breath, abdominal pain     TECHNIQUE:     Unenhanced CHEST, ABDOMEN, PELVIS CT     COMPARISON: 08/31/2010, 08/17/2000     FINDINGS:      Chest CT:     The heart size is moderately enlarged with mild pericardial effusion.  Ascending aorta is dilated, 4.0 cm, previously 3.8 cm. The central  pulmonary prominent in caliber,  suggesting pulmonary arterial  hypertension. For example, the pulmonary trunk is measured at 4.6 cm,  not significantly changed. Left-sided pacemaker and cardiac leads are  seen.     Enlarged mediastinal lymph noted is seen, nonspecific, could be reactive  in nature, potentially evidence of neoplasm; right paratracheal 1.5 cm  short axis lymph node on image 32 of series 1, previously 0.5 cm.     Assessment of vascular and mediastinal structures is significantly  limited without intravenous contrast material.        The airways show decreased AP dimension of the trachea and bilateral  mainstem bronchi, left more so than right suggesting acquired  tracheobronchomalacia. For example on the left, the AP dimension of the  airway is about 1 mm on image 38 of series 2. Likewise, the AP dimension  of the airway on the lower trachea is about 1 mm on image 32 of series  2. This appearance is a change from the prior exam     Small bilateral pleural effusions are present.           The lungs show no focal pulmonary consolidation or mass. Dependent  atelectasis is present. The lungs appear mildly edematous.                 Abdomen pelvis CT:     The kidneys appear atrophied. Arterial calcifications are conspicuous at  the bilateral renal sayda.     Mild perihepatic, perisplenic ascites. Liver appears mildly enlarged.     Pancreas is thinned.     The left adrenal gland is enlarged, low-density, 3.1 CT units suggesting  presence of adenoma.     Otherwise unremarkable unenhanced appearance of the liver, spleen,  adrenal glands, pancreas, kidneys, bladder.     No bowel obstruction . Small hiatal hernia is present. Appendix does not  appear inflamed. Assessment of the colon is limited by lack of  distention, lack of enteric contrast material. Diffuse mesenteric edema  limits assessment for inflammatory changes of bowel.              No free intraperitoneal gas. Small abdominal and pelvic free fluid.     Scattered small mesenteric and  para-aortic lymph nodes are seen that are  not significant by size criteria.     Abdominal aorta is not aneurysmal. Aortic and extensive other arterial  calcifications are present.     Degenerative changes are seen in the spine. No acute fracture is  identified.     A lipoma is apparent at the right abdominal wall, for example image 22  of series 1, 4 cm.     Diffuse subcutaneous edema is compatible with anasarca.             Impression:             1. Evidence of tracheobronchomalacia that may be clinically significant.    Moderate cardiomegaly. Dilated ascending aorta and central pulmonary  arteries. Nonspecific prominent mediastinal lymph node, suggest clinical  correlation and follow-up/further evaluation as indicated.     2. Findings suggesting fluid overload, including anasarca, small  bilateral pleural effusions, mild pericardial effusion, mild diffuse  mesenteric edema small abdominal and pelvic ascites, appearance of mild  pulmonary edema.     3. No focal acute inflammatory process of bowel is identified, limited  as described, follow-up as indications persist. Small free fluid in the  pelvis.     4. No obstructive uropathy.     Discussed by telephone with the patient's nurse, Avril, at time of  interpretation, 1528, 8/20/2017.           This report was finalized on 8/20/2017 3:32 PM by Dr. Mg Lorenzana MD.       CT Abdomen Pelvis Without Contrast [[id number]] Collected:  08/20/17 1506     Updated:  08/20/17 1536    Narrative:       CT CHEST WO CONTRAST-, CT ABDOMEN PELVIS WO CONTRAST-     INDICATIONS: Fever. Short of breath, abdominal pain     TECHNIQUE:     Unenhanced CHEST, ABDOMEN, PELVIS CT     COMPARISON: 08/31/2010, 08/17/2000     FINDINGS:      Chest CT:     The heart size is moderately enlarged with mild pericardial effusion.  Ascending aorta is dilated, 4.0 cm, previously 3.8 cm. The central  pulmonary prominent in caliber, suggesting pulmonary arterial  hypertension. For example, the  pulmonary trunk is measured at 4.6 cm,  not significantly changed. Left-sided pacemaker and cardiac leads are  seen.     Enlarged mediastinal lymph noted is seen, nonspecific, could be reactive  in nature, potentially evidence of neoplasm; right paratracheal 1.5 cm  short axis lymph node on image 32 of series 1, previously 0.5 cm.     Assessment of vascular and mediastinal structures is significantly  limited without intravenous contrast material.        The airways show decreased AP dimension of the trachea and bilateral  mainstem bronchi, left more so than right suggesting acquired  tracheobronchomalacia. For example on the left, the AP dimension of the  airway is about 1 mm on image 38 of series 2. Likewise, the AP dimension  of the airway on the lower trachea is about 1 mm on image 32 of series  2. This appearance is a change from the prior exam     Small bilateral pleural effusions are present.           The lungs show no focal pulmonary consolidation or mass. Dependent  atelectasis is present. The lungs appear mildly edematous.                 Abdomen pelvis CT:     The kidneys appear atrophied. Arterial calcifications are conspicuous at  the bilateral renal sayda.     Mild perihepatic, perisplenic ascites. Liver appears mildly enlarged.     Pancreas is thinned.     The left adrenal gland is enlarged, low-density, 3.1 CT units suggesting  presence of adenoma.     Otherwise unremarkable unenhanced appearance of the liver, spleen,  adrenal glands, pancreas, kidneys, bladder.     No bowel obstruction . Small hiatal hernia is present. Appendix does not  appear inflamed. Assessment of the colon is limited by lack of  distention, lack of enteric contrast material. Diffuse mesenteric edema  limits assessment for inflammatory changes of bowel.              No free intraperitoneal gas. Small abdominal and pelvic free fluid.     Scattered small mesenteric and para-aortic lymph nodes are seen that are  not significant by  size criteria.     Abdominal aorta is not aneurysmal. Aortic and extensive other arterial  calcifications are present.     Degenerative changes are seen in the spine. No acute fracture is  identified.     A lipoma is apparent at the right abdominal wall, for example image 22  of series 1, 4 cm.     Diffuse subcutaneous edema is compatible with anasarca.             Impression:             1. Evidence of tracheobronchomalacia that may be clinically significant.    Moderate cardiomegaly. Dilated ascending aorta and central pulmonary  arteries. Nonspecific prominent mediastinal lymph node, suggest clinical  correlation and follow-up/further evaluation as indicated.     2. Findings suggesting fluid overload, including anasarca, small  bilateral pleural effusions, mild pericardial effusion, mild diffuse  mesenteric edema small abdominal and pelvic ascites, appearance of mild  pulmonary edema.     3. No focal acute inflammatory process of bowel is identified, limited  as described, follow-up as indications persist. Small free fluid in the  pelvis.     4. No obstructive uropathy.     Discussed by telephone with the patient's nurse, Avril, at time of  interpretation, 1528, 8/20/2017.           This report was finalized on 8/20/2017 3:32 PM by Dr. Mg Lorenzana MD.       XR Chest 1 View [[id number]] Collected:  08/20/17 1350     Updated:  08/20/17 1638    Narrative:       SINGLE VIEW CHEST RADIOGRAPH     HISTORY: 72-year-old female status post recent MI, found down with a  history of COPD.     FINDINGS: An upright AP portable chest radiograph was obtained.  Comparison is made to a prior examination dated 07/18/2017. There is  mild interstitial prominence seen throughout both lungs. The cardiac  silhouette is enlarged but stable. Multiple midline sternal wires are  noted. Osteopenia is also appreciated. A dual-lead left-sided cardiac  pacemaker/fibrillator is also noted.       Impression:       Stable cardiomegaly with  scattered interstitial scarring  throughout both lungs. No evidence for superimposed acute process is  appreciated.     This report was finalized on 8/20/2017 4:35 PM by Dr. Freddy Forte MD.       CT Head Without Contrast [[id number]] Collected:  08/20/17 1510     Updated:  08/20/17 1716    Narrative:       CT OF THE HEAD WITHOUT CONTRAST     HISTORY: 72-year-old female status post fall with altered mental status.     TECHNIQUE: Contiguous axial images were obtained through the head  without IV contrast.     COMPARISON: CT of the head without contrast, 08/07/2017.     FINDINGS: There is no evidence for intracranial hemorrhage. There is a  moderate hypodense appearance seen within the bilateral periventricular  and subcortical white matter of the cerebral hemispheres. No territorial  edema is appreciated. There is no midline shift or abnormal extraaxial  fluid collection. The visualized paranasal sinuses and mastoid air cells  are clear. The visualized osseous structures of the skull have a normal  appearance.       Impression:       There is no evidence for an acute intracranial abnormality. Findings are  consistent with moderate chronic small vessel ischemic change over the  cerebral white matter are noted.     This report was finalized on 8/20/2017 5:13 PM by Dr. Freddy Forte MD.       XR Knee 1 or 2 View Left [[id number]] Collected:  08/21/17 1356     Updated:  08/21/17 2056    Narrative:       LEFT KNEE X-RAY      HISTORY: Fall, knee pain.     TECHNIQUE: 2 images of the left knee are correlated with left knee x-ray  dated 08/07/2017, also acquired following trauma.     FINDINGS: There is advanced osteoarthritic change at the knee, most  pronounced in the medial and patellofemoral compartments. The lateral  view was acquired with the knee bent but it appears that there may be  some effusion in the suprapatellar pouch. There was no effusion present  on the prior x-ray. There is extensive vascular  calcification. No  fracture is identified. There is no bone lesion.       Impression:       Advanced arthritic change of the left knee with a suspected  joint effusion of uncertain etiology. Depending on the clinical  scenario, further evaluation with CT may be useful. The patient has a  cardiac pacing device and may not be able to undergo MRI.     This report was finalized on 8/21/2017 8:52 PM by Dr. Alex Mueller MD.             Discharge Exam:  Restless, not arousable.  Does not follow any commands  Supple neck, midline trach  Tachycardic, occasional PVCs.  Soft 2 over 3 systolic murmur.  I cannot palpate her radial pulses or her pedal pulses.  1+ ankle edema  Scattered rhonchi bilaterally, no wheezing, nonlabored  No clubbing or cyanosis     Disposition:  Palliative floor at [facility]      Total time spent discharging patient including evaluation, medication reconciliation, arranging follow up, and post hospitalization instructions and education total time exceeds 30 minutes.  I discussed the case with Dr. Lamar    Signed:  Aj Saavedra MD  8/24/2017  8:50 AM

## 2017-08-24 NOTE — PROGRESS NOTES
Continued Stay Note  [facility]     Patient Name: Keisha Nelson  MRN: [id number]  Today's Date: 8/24/2017    Admit Date: 8/20/2017          Discharge Plan       08/24/17 1451    Case Management/Social Work Plan    Plan Plan transfer to palliative care.  Freddy RN    Additional Comments Pt's family meeting with palliative care and [facility] concerning comfort measures.  Freddy RN              Discharge Codes     None        Expected Discharge Date and Time     Expected Discharge Date Expected Discharge Time    Aug 24, 2017             Sherly Jerry, RN

## 2017-08-24 NOTE — SIGNIFICANT NOTE
08/24/17 0851   Rehab Treatment   Discipline speech language pathologist   Rehab Evaluation   Evaluation Not Performed unable to evaluate, medical status change  (SLP discussed with RN, patient not appropriate for bedside swallow at this time. Palliative is consulted. RN to contact SLP if skilled services warranted. SLP to follow peripherally. )   Recommendations   SLP - Next Appointment (PRN)

## 2017-08-24 NOTE — PROGRESS NOTES
Noted plans to transition to palliative/comfort care  CODE STATUS change to DNR  No further dialysis  We will sign off, please call if any questions

## 2017-08-24 NOTE — PLAN OF CARE
Problem: Patient Care Overview (Adult)  Goal: Plan of Care Review  Outcome: Unable to achieve outcome(s) by discharge Date Met:  08/24/17 08/24/17 1113   Coping/Psychosocial Response Interventions   Plan Of Care Reviewed With family   Outcome Evaluation   Outcome Summary/Follow up Plan Palliative consult done. Waiting for palliative/[facility] nurse to talk to patient's family. Updated family on plan of care. Reassurance offered.         Problem: Fall Risk (Adult)  Goal: Absence of Falls  Outcome: Ongoing (interventions implemented as appropriate)    Problem: Skin Integrity Impairment, Risk/Actual (Adult)  Goal: Skin Integrity/Wound Healing  Outcome: Ongoing (interventions implemented as appropriate)    Problem: Fluid Volume Excess (Adult,Obstetrics,Pediatric)  Goal: Stable Weight  Outcome: Unable to achieve outcome(s) by discharge Date Met:  08/24/17  Goal: Balanced Intake/Output  Outcome: Unable to achieve outcome(s) by discharge Date Met:  08/24/17

## 2017-08-24 NOTE — PLAN OF CARE
Problem: Patient Care Overview (Adult)  Goal: Plan of Care Review  Outcome: Ongoing (interventions implemented as appropriate)    08/24/17 8734   Coping/Psychosocial Response Interventions   Plan Of Care Reviewed With patient;family   Patient Care Overview   Progress no change   Outcome Evaluation   Outcome Summary/Follow up Plan Pt still requiring levophed for BP support and 5L NC for O2 sats. Robinul ordered for secretions and ativan given for agitation. CPOT scores are mostly 0, occasionally she will become restless and start moaning, repositioning and medications seem to help this.          Problem: Fall Risk (Adult)  Goal: Absence of Falls  Outcome: Ongoing (interventions implemented as appropriate)    Problem: Skin Integrity Impairment, Risk/Actual (Adult)  Goal: Skin Integrity/Wound Healing  Outcome: Ongoing (interventions implemented as appropriate)    Problem: Fluid Volume Excess (Adult,Obstetrics,Pediatric)  Goal: Stable Weight  Outcome: Ongoing (interventions implemented as appropriate)  Goal: Balanced Intake/Output  Outcome: Ongoing (interventions implemented as appropriate)

## 2017-08-25 ENCOUNTER — RESULTS ENCOUNTER (OUTPATIENT)
Dept: ENDOCRINOLOGY | Age: 72
End: 2017-08-25

## 2017-08-25 DIAGNOSIS — E83.51 HYPOCALCEMIA: ICD-10-CM

## 2017-08-25 DIAGNOSIS — E11.8 TYPE 2 DIABETES MELLITUS WITH COMPLICATION, UNSPECIFIED LONG TERM INSULIN USE STATUS: ICD-10-CM

## 2017-08-25 DIAGNOSIS — E55.9 VITAMIN D DEFICIENCY: ICD-10-CM

## 2017-08-25 LAB — BACTERIA SPEC AEROBE CULT: NORMAL

## 2017-08-25 NOTE — PROGRESS NOTES
Discharge Planning Assessment  [facility]     Patient Name: Keisha Nelson  MRN: [id number]  Today's Date: 8/25/2017    Admit Date: 8/20/2017          Discharge Needs Assessment     None            Discharge Plan       08/25/17 1137    Final Note    Final Note The patient was admitted to a [facility] bed on 8/24/17. ANN MARIE medina RN, CCP.         Discharge Placement     Facility/Agency Request Status Selected? Address Phone Number Fax Number    [facility] Pending - Request Sent     [address] [phone]        Nuha Mccall RN 8/21/2017 16:31    email sent to [facility]. No bed hold, may return                   Expected Discharge Date and Time     Expected Discharge Date Expected Discharge Time    Aug 24, 2017  6:16 PM              Demographic Summary     None            Functional Status     None            Psychosocial     None            Abuse/Neglect     None            Legal     None            Substance Abuse     None            Patient Forms     None          Simona Medina, RN

## 2019-08-25 NOTE — PROGRESS NOTES
"   LOS: 2 days   Patient Care Team:  Yamilet Zurita MD as PCP - General (Family Medicine)    Chief Complaint/ Reason for encounter: End-stage renal disease, dialysis management  Chief Complaint   Patient presents with   • Altered Mental Status   • Fever         Subjective     History of Present Illness    Subjective:  Symptoms:  Worsening.  No shortness of breath or chest pain.  (Not doing as well today  Poorly responsive  Blood pressure too unstable for dialysis today  Remains on pressors  Family meeting with hospice later today to discuss goals of care).    Diet:  Poor intake.  No nausea.    Activity level: Impaired due to weakness.    Pain:  She reports no pain.          History taken from: Patient and chart    Objective     Vital Signs  Temp:  [97.5 °F (36.4 °C)-98.3 °F (36.8 °C)] 98.2 °F (36.8 °C)  Heart Rate:  [] 81  BP: ()/() 121/110       Wt Readings from Last 1 Encounters:   08/22/17 0425 195 lb 1.7 oz (88.5 kg)   08/21/17 0515 194 lb 14.2 oz (88.4 kg)   08/20/17 1129 250 lb (113 kg)       Objective:  General Appearance:  Comfortable, in no acute distress, not in pain and ill-appearing (Intubated and sedated on the ventilator).    Vital signs: (most recent): Blood pressure (!) 121/110, pulse 81, temperature 98.2 °F (36.8 °C), resp. rate 28, height 66\" (167.6 cm), weight 195 lb 1.7 oz (88.5 kg), SpO2 99 %.  Vital signs are normal.  No fever.    Output: Minimal urine output.    HEENT: Normal HEENT exam.    Lungs:  Normal respiratory rate and normal effort.  She is not in respiratory distress.  Breath sounds clear to auscultation.  There are rhonchi and decreased breath sounds.  No wheezes or rales.    Heart: Normal rate.  Regular rhythm.  S1 normal and S2 normal.  No murmur.   Abdomen: Abdomen is soft and non-distended.  Bowel sounds are normal.   There is no abdominal tenderness.  There is no epigastric area or no suprapubic area tenderness.  There is no rebound tenderness.   "   Extremities: Normal range of motion.  There is no deformity or dependent edema.    Pulses: Distal pulses are intact.    Pupils:  Pupils are equal, round, and reactive to light.    Skin:  Warm and dry.  No rash or cyanosis.             Results Review:    Past Medical History: reviewed and updated  Past Medical History:   Diagnosis Date   • Acute diastolic CHF (congestive heart failure)    • Anxiety    • Asthma    • Atrial fibrillation     chronic    • Chronic diastolic heart failure    • Chronic obstructive bronchitis    • COPD (chronic obstructive pulmonary disease)    • Depression    • Diabetes mellitus    • ESRD (end stage renal disease)    • Falls    • Hyperlipidemia    • Hypertension    • Long term current use of anticoagulant therapy    • Muscle weakness    • Pulmonary hypertension    • Sleep apnea    • SOB (shortness of breath)    • Tricuspid regurgitation          Allergies:  No Known Allergies    Intake/Output:     Intake/Output Summary (Last 24 hours) at 08/22/17 1417  Last data filed at 08/22/17 0858   Gross per 24 hour   Intake           1132.1 ml   Output                0 ml   Net           1132.1 ml         DATA:  Interval chart, labs and notes reviewed.    Labs:   Recent Results (from the past 24 hour(s))   POC Glucose Fingerstick    Collection Time: 08/21/17  3:30 PM   Result Value Ref Range    Glucose 101 70 - 130 mg/dL   POC Glucose Fingerstick    Collection Time: 08/22/17 12:22 AM   Result Value Ref Range    Glucose 139 (H) 70 - 130 mg/dL   Basic Metabolic Panel    Collection Time: 08/22/17  4:21 AM   Result Value Ref Range    Glucose 160 (H) 65 - 99 mg/dL    BUN 41 (H) 8 - 23 mg/dL    Creatinine 5.51 (H) 0.57 - 1.00 mg/dL    Sodium 133 (L) 136 - 145 mmol/L    Potassium 4.6 3.5 - 5.2 mmol/L    Chloride 92 (L) 98 - 107 mmol/L    CO2 15.4 (L) 22.0 - 29.0 mmol/L    Calcium 9.4 8.6 - 10.5 mg/dL    eGFR  African Amer 9 (L) >60 mL/min/1.73    BUN/Creatinine Ratio 7.4 7.0 - 25.0    Anion Gap 25.6 mmol/L    Vancomycin, Random    Collection Time: 08/22/17  4:21 AM   Result Value Ref Range    Vancomycin Random 20.80 5.00 - 40.00 mcg/mL   POC Glucose Fingerstick    Collection Time: 08/22/17  4:21 AM   Result Value Ref Range    Glucose 135 (H) 70 - 130 mg/dL   Protime-INR    Collection Time: 08/22/17  8:52 AM   Result Value Ref Range    Protime 34.4 (H) 11.7 - 14.2 Seconds    INR 3.54 (H) 0.90 - 1.10   POC Glucose Fingerstick    Collection Time: 08/22/17 12:28 PM   Result Value Ref Range    Glucose 117 70 - 130 mg/dL   Duplex Venous Upper Extremity - Bilateral CAR    Collection Time: 08/22/17  1:35 PM   Result Value Ref Range    Right Internal Jugular Augment N     Right Internal Jugular Compress N     Right Internal Jugular Phasic N     Right Internal Jugular Spont N     Right Internal Jugular Thrombus A     Right Internal Jugular Color 1     Right Subclavian Augment Y     Right Subclavian Competent Y     Right Subclavian Compress C     Right Subclavian Phasic Y     Right Subclavian Spont Y     Right Axillary Augment Y     Right Axillary Competent Y     Right Axillary Compress C     Right Axillary Phasic Y     Right Axillary Spont Y     Right Brachial Compress C     Right Radial Compress C     Right Ulnar Compress C     Right Basilic Upper Compress C     Right Basilic Forearm Compress C     Right Cephalic Upper Compress C     Right Cephalic Forearm Compress C     Left Internal Jugular Augment Y     Left Internal Jugular Competent Y     Left Internal Jugular Compress C     Left Internal Jugular Phasic Y     Left Internal Jugular Spont Y     Left Subclavian Augment Y     Left Subclavian Competent Y     Left Subclavian Phasic Y     Left Subclavian Spont Y     Left Axillary Augment Y     Left Axillary Competent Y     Left Axillary Compress C     Left Axillary Phasic Y     Left Axillary Spont Y     Left Brachial Compress C     Left Radial Compress C     Left Ulnar Compress C     Left Basilic Upper Compress C     Left Basilic  Calm Forearm Compress C     Left Cephalic Upper Compress C     Left Cephalic Forearm Compress C        Radiology:  Imaging Results (last 24 hours)     Procedure Component Value Units Date/Time    XR Knee 1 or 2 View Left [[id number]] Collected:  08/21/17 1356     Updated:  08/21/17 2056    Narrative:       LEFT KNEE X-RAY      HISTORY: Fall, knee pain.     TECHNIQUE: 2 images of the left knee are correlated with left knee x-ray  dated 08/07/2017, also acquired following trauma.     FINDINGS: There is advanced osteoarthritic change at the knee, most  pronounced in the medial and patellofemoral compartments. The lateral  view was acquired with the knee bent but it appears that there may be  some effusion in the suprapatellar pouch. There was no effusion present  on the prior x-ray. There is extensive vascular calcification. No  fracture is identified. There is no bone lesion.       Impression:       Advanced arthritic change of the left knee with a suspected  joint effusion of uncertain etiology. Depending on the clinical  scenario, further evaluation with CT may be useful. The patient has a  cardiac pacing device and may not be able to undergo MRI.     This report was finalized on 8/21/2017 8:52 PM by Dr. Alex Mueller MD.                Medications have been reviewed:  Current Facility-Administered Medications   Medication Dose Route Frequency Provider Last Rate Last Dose   • famotidine (PEPCID) injection 20 mg  20 mg Intravenous Daily Aj Saavedra MD   20 mg at 08/22/17 0854   • LORazepam (ATIVAN) injection 0.25 mg  0.25 mg Intravenous BID PRN Aj Saavedra MD       • melatonin tablet 5 mg  5 mg Oral Nightly PRN Aj Saavedra MD   5 mg at 08/21/17 2215   • metoprolol tartrate (LOPRESSOR) tablet 25 mg  25 mg Oral Q12H Ricky Rogers MD   25 mg at 08/21/17 1203   • norepinephrine (LEVOPHED) 8 mg/250 mL (32 mcg/mL) in sodium chloride 0.9% infusion (premix)  0.02-0.3 mcg/kg/min Intravenous Titrated Steve E  MD Sandra 33.9 mL/hr at 08/22/17 1340 0.16 mcg/kg/min at 08/22/17 1340   • ondansetron (ZOFRAN) tablet 4 mg  4 mg Oral Q6H PRN Aj H. MD Keri        Or   • ondansetron ODT (ZOFRAN-ODT) disintegrating tablet 4 mg  4 mg Oral Q6H PRN Aj H. MD Keri        Or   • ondansetron (ZOFRAN) injection 4 mg  4 mg Intravenous Q6H PRN Aj H. MD Keri   4 mg at 08/21/17 0759   • Pharmacy to dose vancomycin   Does not apply Continuous PRN Aj H. MD Keri       • phytonadione (AQUA-MEPHYTON, VITAMIN K) 5 mg in sodium chloride 0.9 % 50 mL IVPB  5 mg Intravenous Once Aj H. MD Keri 50 mL/hr at 08/22/17 1347 5 mg at 08/22/17 1347   • sodium chloride 0.9 % flush 1-10 mL  1-10 mL Intravenous PRN Aj H. MD Keri       • sodium chloride 0.9 % flush 10 mL  10 mL Intravenous PRN Steve Flores MD       • vancomycin 750 mg/250 mL 0.9% NS add-vantage  750 mg Intravenous Once Aj H. MD Keri       • Vancomycin Pharmacy Intermittent Dosing   Does not apply Daily Aj H. MD Keri           Assessment/Plan     Active Problems:    Sepsis      Assessment:  (End-stage renal disease, treatment canceled today due to worsening hypotension  Sepsis syndrome, seems a bit worse today, bit more hypotensive  Hypotension on Levophed  Pulmonary edema, status post dialysis with UF  Acute on chronic diastolic heart failure  Severe pulmonary hypertension  Acute hypoxemic respiratory failure  Hyponatremia secondary to fluid overload  Diabetes mellitus type 2  Toxic metabolic encephalopathy           Plan:   Due to physical decline and worsening hypotension since yesterday we'll cancel dialysis for today  She's too unstable for any effective ultrafiltration with her current blood pressure and electrolytes are stable  Family to meet with hospice team later today to discuss goals of care  Continue Levophed, wean as tolerated  Unfortunately due to prior vascular access and blood clots it's difficult to obtain a reliable blood pressure  reading  Will reassess tomorrow a.m. to determine further need for hemodialysis and timing).             Continue to monitor renal function, electroytes and volume closely   Please call me with any questions or concerns      Toni Oconnell MD   [facility]   [phone]    08/22/17  2:17 PM      Dictation performed using Dragon dictation software

## (undated) DEVICE — CATH DIAG CARD PERFORMA JL4.0 BT 4F100CM

## (undated) DEVICE — GW EMR FIX EXCHG J STD .035 3MM 260CM

## (undated) DEVICE — CATH DIAG CARD PERFORMA JL3.5 BT 4F100CM

## (undated) DEVICE — HI-TORQUE WHISPER ES GUIDE WIRE .014 STRAIGHTTIP 3.0 CM X 190 CM: Brand: HI-TORQUE WHISPER

## (undated) DEVICE — INTRO SHEATH ART/FEM ENGAGE .035 4F12CM

## (undated) DEVICE — CATH DIAG CARD PERFORM JR4.0 BT 4F100CM

## (undated) DEVICE — Device

## (undated) DEVICE — GLIDESHEATH BASIC HYDROPHILIC COATED INTRODUCER SHEATH: Brand: GLIDESHEATH

## (undated) DEVICE — BALN PRESS WEDGE 5F 110CM

## (undated) DEVICE — CATH VENT MIV RADL PIG ST TIP 4F 110CM

## (undated) DEVICE — GW INQWIRE FC PTFE STR .035IN 150

## (undated) DEVICE — INTRO SHEATH ART/FEM ENGAGE .035 5F12CM